# Patient Record
Sex: FEMALE | Race: WHITE | NOT HISPANIC OR LATINO | Employment: OTHER | URBAN - METROPOLITAN AREA
[De-identification: names, ages, dates, MRNs, and addresses within clinical notes are randomized per-mention and may not be internally consistent; named-entity substitution may affect disease eponyms.]

---

## 2017-02-07 ENCOUNTER — OFFICE VISIT (OUTPATIENT)
Dept: URGENT CARE | Facility: CLINIC | Age: 82
End: 2017-02-07
Payer: MEDICARE

## 2017-02-07 ENCOUNTER — APPOINTMENT (OUTPATIENT)
Dept: RADIOLOGY | Facility: IMAGING CENTER | Age: 82
End: 2017-02-07
Attending: PHYSICIAN ASSISTANT
Payer: MEDICARE

## 2017-02-07 VITALS
BODY MASS INDEX: 21.62 KG/M2 | WEIGHT: 126 LBS | OXYGEN SATURATION: 97 % | SYSTOLIC BLOOD PRESSURE: 122 MMHG | HEART RATE: 88 BPM | RESPIRATION RATE: 18 BRPM | TEMPERATURE: 97.6 F | DIASTOLIC BLOOD PRESSURE: 80 MMHG

## 2017-02-07 DIAGNOSIS — J40 BRONCHITIS: ICD-10-CM

## 2017-02-07 DIAGNOSIS — J06.9 URI WITH COUGH AND CONGESTION: ICD-10-CM

## 2017-02-07 DIAGNOSIS — J40 BRONCHITIS: Primary | ICD-10-CM

## 2017-02-07 PROCEDURE — 99214 OFFICE O/P EST MOD 30 MIN: CPT | Performed by: PHYSICIAN ASSISTANT

## 2017-02-07 PROCEDURE — G8432 DEP SCR NOT DOC, RNG: HCPCS | Performed by: PHYSICIAN ASSISTANT

## 2017-02-07 PROCEDURE — 1036F TOBACCO NON-USER: CPT | Performed by: PHYSICIAN ASSISTANT

## 2017-02-07 PROCEDURE — 4040F PNEUMOC VAC/ADMIN/RCVD: CPT | Mod: 8P | Performed by: PHYSICIAN ASSISTANT

## 2017-02-07 PROCEDURE — G8419 CALC BMI OUT NRM PARAM NOF/U: HCPCS | Performed by: PHYSICIAN ASSISTANT

## 2017-02-07 PROCEDURE — 71020 DX-CHEST-2 VIEWS: CPT | Mod: TC | Performed by: PHYSICIAN ASSISTANT

## 2017-02-07 PROCEDURE — 1101F PT FALLS ASSESS-DOCD LE1/YR: CPT | Performed by: PHYSICIAN ASSISTANT

## 2017-02-07 PROCEDURE — G8484 FLU IMMUNIZE NO ADMIN: HCPCS | Performed by: PHYSICIAN ASSISTANT

## 2017-02-07 RX ORDER — PROMETHAZINE HYDROCHLORIDE AND CODEINE PHOSPHATE 6.25; 1 MG/5ML; MG/5ML
5 SYRUP ORAL 4 TIMES DAILY PRN
Qty: 240 ML | Refills: 0 | Status: SHIPPED | OUTPATIENT
Start: 2017-02-07 | End: 2017-02-21

## 2017-02-07 RX ORDER — SULFAMETHOXAZOLE AND TRIMETHOPRIM 800; 160 MG/1; MG/1
1 TABLET ORAL 2 TIMES DAILY
Qty: 20 TAB | Refills: 0 | Status: SHIPPED | OUTPATIENT
Start: 2017-02-07 | End: 2017-02-17

## 2017-02-07 RX ORDER — BENZONATATE 200 MG/1
200 CAPSULE ORAL 3 TIMES DAILY PRN
Qty: 21 CAP | Refills: 0 | Status: SHIPPED | OUTPATIENT
Start: 2017-02-07 | End: 2018-01-24 | Stop reason: SDUPTHER

## 2017-02-07 NOTE — MR AVS SNAPSHOT
Char Rockwell   2017 10:00 AM   Office Visit   MRN: [id number]    Department:  [facility]   Dept Phone:  [phone]    Description:  Female : 3/1/1930   Provider:  Vasu Ramirez PA-C           Reason for Visit     Sinus Problem           Allergies as of 2017     No Known Allergies      You were diagnosed with     Bronchitis   [873457]  -  Primary     URI with cough and congestion   [[id number]]         Vital Signs     Blood Pressure Pulse Temperature Respirations Weight Oxygen Saturation    122/80 mmHg 88 36.4 °C (97.6 °F) 18 57.153 kg (126 lb) 97%    Smoking Status                   Never Smoker            Basic Information     Date Of Birth Sex Race Ethnicity Preferred Language    3/1/1930 Female Unknown Non- English      Problem List              ICD-10-CM Priority Class Noted - Resolved    Osteoporosis M81.0   2016 - Present    Elevated blood pressure I10   2016 - Present    Preventative health care Z00.00   2016 - Present    Hyperlipidemia LDL goal <100 E78.5   2016 - Present    Cataracts, bilateral H26.9   2016 - Present      Health Maintenance        Date Due Completion Dates    IMM DTaP/Tdap/Td Vaccine (1 - Tdap) 3/1/1949 ---    PAP SMEAR 3/1/1951 ---    MAMMOGRAM 3/1/1970 ---    COLONOSCOPY 3/1/1980 ---    IMM ZOSTER VACCINE 3/1/1990 ---    BONE DENSITY 3/1/1995 ---    IMM PNEUMOCOCCAL 65+ (ADULT) LOW/MEDIUM RISK SERIES (1 of 2 - PCV13) 3/1/1995 ---    IMM INFLUENZA (1) 2016 ---            Current Immunizations     No immunizations on file.      Below and/or attached are the medications your provider expects you to take. Review all of your home medications and newly ordered medications with your provider and/or pharmacist. Follow medication instructions as directed by your provider and/or pharmacist. Please keep your medication list with you and share with your provider. Update the information when medications are discontinued, doses  are changed, or new medications (including over-the-counter products) are added; and carry medication information at all times in the event of emergency situations     Allergies:  No Known Allergies          Medications  Valid as of: February 07, 2017 - 11:08 AM    Generic Name Brand Name Tablet Size Instructions for use    Benzonatate (Cap) TESSALON 200 MG Take 1 Cap by mouth 3 times a day as needed for Cough for up to 7 days.        Promethazine-Codeine (Syrup) PHENERGAN-CODEINE 6.25-10 MG/5ML Take 5 mL by mouth 4 times a day as needed for up to 14 days.        Sulfamethoxazole-Trimethoprim (Tab) BACTRIM -160 MG Take 1 Tab by mouth 2 times a day for 10 days.        .                 Medicines prescribed today were sent to:     [id number] - [place], [place] - [address]    [address] [place] [address]    Phone: [phone] Fax: [phone]    Open 24 Hours?: No      Medication refill instructions:       If your prescription bottle indicates you have medication refills left, it is not necessary to call your provider’s office. Please contact your pharmacy and they will refill your medication.    If your prescription bottle indicates you do not have any refills left, you may request refills at any time through one of the following ways: The online PublicRelay system (except Urgent Care), by calling your provider’s office, or by asking your pharmacy to contact your provider’s office with a refill request. Medication refills are processed only during regular business hours and may not be available until the next business day. Your provider may request additional information or to have a follow-up visit with you prior to refilling your medication.   *Please Note: Medication refills are assigned a new Rx number when refilled electronically. Your pharmacy may indicate that no refills were authorized even though a new prescription for the same medication is available at the pharmacy. Please request the  medicine by name with the pharmacy before contacting your provider for a refill.        Your To Do List     Future Labs/Procedures Complete By Expires    DX-CHEST-2 VIEWS  As directed 2/7/2018      Instructions    Acute Bronchitis  Bronchitis is inflammation of the airways that extend from the windpipe into the lungs (bronchi). The inflammation often causes mucus to develop. This leads to a cough, which is the most common symptom of bronchitis.   In acute bronchitis, the condition usually develops suddenly and goes away over time, usually in a couple weeks. Smoking, allergies, and asthma can make bronchitis worse. Repeated episodes of bronchitis may cause further lung problems.   CAUSES  Acute bronchitis is most often caused by the same virus that causes a cold. The virus can spread from person to person (contagious) through coughing, sneezing, and touching contaminated objects.  SIGNS AND SYMPTOMS   · Cough.    · Fever.    · Coughing up mucus.    · Body aches.    · Chest congestion.    · Chills.    · Shortness of breath.    · Sore throat.    DIAGNOSIS   Acute bronchitis is usually diagnosed through a physical exam. Your health care provider will also ask you questions about your medical history. Tests, such as chest X-rays, are sometimes done to rule out other conditions.   TREATMENT   Acute bronchitis usually goes away in a couple weeks. Oftentimes, no medical treatment is necessary. Medicines are sometimes given for relief of fever or cough. Antibiotic medicines are usually not needed but may be prescribed in certain situations. In some cases, an inhaler may be recommended to help reduce shortness of breath and control the cough. A cool mist vaporizer may also be used to help thin bronchial secretions and make it easier to clear the chest.   HOME CARE INSTRUCTIONS  · Get plenty of rest.    · Drink enough fluids to keep your urine clear or pale yellow (unless you have a medical condition that requires fluid  restriction). Increasing fluids may help thin your respiratory secretions (sputum) and reduce chest congestion, and it will prevent dehydration.    · Take medicines only as directed by your health care provider.  · If you were prescribed an antibiotic medicine, finish it all even if you start to feel better.  · Avoid smoking and secondhand smoke. Exposure to cigarette smoke or irritating chemicals will make bronchitis worse. If you are a smoker, consider using nicotine gum or skin patches to help control withdrawal symptoms. Quitting smoking will help your lungs heal faster.    · Reduce the chances of another bout of acute bronchitis by washing your hands frequently, avoiding people with cold symptoms, and trying not to touch your hands to your mouth, nose, or eyes.    · Keep all follow-up visits as directed by your health care provider.    SEEK MEDICAL CARE IF:  Your symptoms do not improve after 1 week of treatment.   SEEK IMMEDIATE MEDICAL CARE IF:  · You develop an increased fever or chills.    · You have chest pain.    · You have severe shortness of breath.  · You have bloody sputum.    · You develop dehydration.  · You faint or repeatedly feel like you are going to pass out.  · You develop repeated vomiting.  · You develop a severe headache.  MAKE SURE YOU:   · Understand these instructions.  · Will watch your condition.  · Will get help right away if you are not doing well or get worse.     This information is not intended to replace advice given to you by your health care provider. Make sure you discuss any questions you have with your health care provider.     Document Released: 01/25/2006 Document Revised: 01/08/2016 Document Reviewed: 06/10/2014  [employer] Interactive Patient Education ©2016 [employer]            [email or web address] Access Code: [email or web address]  Expires: 3/9/2017 11:03 AM    Xinguodu  A secure, online tool to manage your health information     [employer]’s Xinguodu® is a secure, online tool that  connects you to your personalized health information from the privacy of your home -- day or night - making it very easy for you to manage your healthcare. Once the activation process is completed, you can even access your medical information using the [employer] shu, which is available for free in the Apple Shu store or Google Play store.     [employer] provides the following levels of access (as shown below):   My Chart Features   [facility] Doctor [facility]  Specialists [facility]  Urgent  Care Non-[facility]  Primary Care  Doctor   Email your healthcare team securely and privately 24/7 X X X    Manage appointments: schedule your next appointment; view details of past/upcoming appointments X      Request prescription refills. X      View recent personal medical records, including lab and immunizations X X X X   View health record, including health history, allergies, medications X X X X   Read reports about your outpatient visits, procedures, consult and ER notes X X X X   See your discharge summary, which is a recap of your hospital and/or ER visit that includes your diagnosis, lab results, and care plan. X X       How to register for [employer]:  1. Go to  [email or web address].  2. Click on the Sign Up Now box, which takes you to the New Member Sign Up page. You will need to provide the following information:  a. Enter your [employer] Access Code exactly as it appears at the top of this page. (You will not need to use this code after you’ve completed the sign-up process. If you do not sign up before the expiration date, you must request a new code.)   b. Enter your date of birth.   c. Enter your home email address.   d. Click Submit, and follow the next screen’s instructions.  3. Create a [employer] ID. This will be your [employer] login ID and cannot be changed, so think of one that is secure and easy to remember.  4. Create a [employer] password. You can change your password at any time.  5. Enter your Password Reset Question  and Answer. This can be used at a later time if you forget your password.   6. Enter your e-mail address. This allows you to receive e-mail notifications when new information is available in [employer].  7. Click Sign Up. You can now view your health information.    For assistance activating your [employer] account, call [phone]

## 2017-02-07 NOTE — PATIENT INSTRUCTIONS
Acute Bronchitis  Bronchitis is inflammation of the airways that extend from the windpipe into the lungs (bronchi). The inflammation often causes mucus to develop. This leads to a cough, which is the most common symptom of bronchitis.   In acute bronchitis, the condition usually develops suddenly and goes away over time, usually in a couple weeks. Smoking, allergies, and asthma can make bronchitis worse. Repeated episodes of bronchitis may cause further lung problems.   CAUSES  Acute bronchitis is most often caused by the same virus that causes a cold. The virus can spread from person to person (contagious) through coughing, sneezing, and touching contaminated objects.  SIGNS AND SYMPTOMS   · Cough.    · Fever.    · Coughing up mucus.    · Body aches.    · Chest congestion.    · Chills.    · Shortness of breath.    · Sore throat.    DIAGNOSIS   Acute bronchitis is usually diagnosed through a physical exam. Your health care provider will also ask you questions about your medical history. Tests, such as chest X-rays, are sometimes done to rule out other conditions.   TREATMENT   Acute bronchitis usually goes away in a couple weeks. Oftentimes, no medical treatment is necessary. Medicines are sometimes given for relief of fever or cough. Antibiotic medicines are usually not needed but may be prescribed in certain situations. In some cases, an inhaler may be recommended to help reduce shortness of breath and control the cough. A cool mist vaporizer may also be used to help thin bronchial secretions and make it easier to clear the chest.   HOME CARE INSTRUCTIONS  · Get plenty of rest.    · Drink enough fluids to keep your urine clear or pale yellow (unless you have a medical condition that requires fluid restriction). Increasing fluids may help thin your respiratory secretions (sputum) and reduce chest congestion, and it will prevent dehydration.    · Take medicines only as directed by your health care provider.  · If  you were prescribed an antibiotic medicine, finish it all even if you start to feel better.  · Avoid smoking and secondhand smoke. Exposure to cigarette smoke or irritating chemicals will make bronchitis worse. If you are a smoker, consider using nicotine gum or skin patches to help control withdrawal symptoms. Quitting smoking will help your lungs heal faster.    · Reduce the chances of another bout of acute bronchitis by washing your hands frequently, avoiding people with cold symptoms, and trying not to touch your hands to your mouth, nose, or eyes.    · Keep all follow-up visits as directed by your health care provider.    SEEK MEDICAL CARE IF:  Your symptoms do not improve after 1 week of treatment.   SEEK IMMEDIATE MEDICAL CARE IF:  · You develop an increased fever or chills.    · You have chest pain.    · You have severe shortness of breath.  · You have bloody sputum.    · You develop dehydration.  · You faint or repeatedly feel like you are going to pass out.  · You develop repeated vomiting.  · You develop a severe headache.  MAKE SURE YOU:   · Understand these instructions.  · Will watch your condition.  · Will get help right away if you are not doing well or get worse.     This information is not intended to replace advice given to you by your health care provider. Make sure you discuss any questions you have with your health care provider.     Document Released: 01/25/2006 Document Revised: 01/08/2016 Document Reviewed: 06/10/2014  [employer] Interactive Patient Education ©2016 [employer]

## 2017-02-07 NOTE — PROGRESS NOTES
Subjective:      PT is a 86 y.o. female who presents with Sinus Problem            Sinus Problem  This is a new problem. The current episode started in the past 7 days. The problem has been gradually worsening since onset. There has been no fever. Her pain is at a severity of 6/10. The pain is moderate. Past treatments include oral decongestants. The treatment provided no relief.   PT presents to  clinic today complaining of sore throat, fevers, chills, watery eyes, pressure in ears, cough, fatigue, runny nose, wheezing and SOB. PT denies CP, NVD, abdominal pain, joint pain. PT states these symptoms began around 3 days ago and that the pt's family has been sick on and off for the last week. Pt has not taken any medications for this condition. PT states the pain is a 7/10 with coughing and sinus pressure, aching in nature and worse at night.  The pt's medication list, problem list, and allergies have been evaluated and reviewed during today's visit.    PMH:  Past Medical History   Diagnosis Date   • Allergy      environmental       PSH:  Past Surgical History   Procedure Laterality Date   • Mammoplasty augmentation  age 30's       Fam Hx:  Father with hx of HTN    Family Status   Relation Status Death Age   • Mother     • Father     • Daughter Alive        Soc HX:  Social History     Social History   • Marital Status:      Spouse Name: N/A   • Number of Children: N/A   • Years of Education: N/A     Occupational History   • Not on file.     Social History Main Topics   • Smoking status: Never Smoker    • Smokeless tobacco: Never Used   • Alcohol Use: No   • Drug Use: No   • Sexual Activity: Not Currently     Other Topics Concern   • Not on file     Social History Narrative   • No narrative on file         Medications:    Current outpatient prescriptions:   •  sulfamethoxazole-trimethoprim (BACTRIM DS) 800-160 MG tablet, Take 1 Tab by mouth 2 times a day for 10 days., Disp: 20 Tab, Rfl: 0  •   promethazine-codeine (PHENERGAN-CODEINE) 6.25-10 MG/5ML Syrup, Take 5 mL by mouth 4 times a day as needed for up to 14 days., Disp: 240 mL, Rfl: 0  •  benzonatate (TESSALON) 200 MG capsule, Take 1 Cap by mouth 3 times a day as needed for Cough for up to 7 days., Disp: 21 Cap, Rfl: 0      Allergies:  Review of patient's allergies indicates no known allergies.        ROS  Review of Systems   Constitutional: Positive for chills and malaise/fatigue. Negative for fever and diaphoresis.   HENT: Positive for congestion, ear pain and sore throat. Negative for ear discharge, hearing loss, nosebleeds and tinnitus.    Eyes: Negative for blurred vision, double vision and photophobia.   Respiratory: Positive for cough, sputum production, shortness of breath and wheezing. Negative for hemoptysis.    Cardiovascular: Negative for chest pain and palpitations.   Gastrointestinal: Negative for nausea, vomiting, abdominal pain, diarrhea and constipation.   Genitourinary: Negative for dysuria and flank pain.   Musculoskeletal: Negative for joint pain and myalgias.   Skin: Negative for itching and rash.   Neurological: Positive for headaches. Negative for dizziness, tingling and weakness.   Endo/Heme/Allergies: Does not bruise/bleed easily.   Psychiatric/Behavioral: Negative for depression. The patient is not nervous/anxious.             Objective:     /80 mmHg  Pulse 88  Temp(Src) 36.4 °C (97.6 °F)  Resp 18  Wt 57.153 kg (126 lb)  SpO2 97%     Physical Exam       Physical Exam   Constitutional: PT is oriented to person, place, and time. PT appears well-developed and well-nourished. No distress.   HENT:   Head: Normocephalic and atraumatic.   Right Ear: Hearing, tympanic membrane, external ear and ear canal normal.   Left Ear: Hearing, tympanic membrane, external ear and ear canal normal.   Nose: Mucosal edema, rhinorrhea and sinus tenderness present. Right sinus exhibits frontal sinus tenderness. Left sinus exhibits frontal  sinus tenderness.   Mouth/Throat: Uvula is midline. Mucous membranes are pale. Posterior oropharyngeal edema and posterior oropharyngeal erythema present. No oropharyngeal exudate.   Eyes: Conjunctivae normal and EOM are normal. Pupils are equal, round, and reactive to light. Right eye exhibits no discharge. Left eye exhibits no discharge.   Neck: Normal range of motion. Neck supple. No thyromegaly present.   Cardiovascular: Normal rate, regular rhythm, normal heart sounds and intact distal pulses.  Exam reveals no gallop and no friction rub.    No murmur heard.  Pulmonary/Chest: Effort normal. No respiratory distress. PT has wheezes. PT has no rales. PT exhibits tenderness.   Abdominal: Soft. Bowel sounds are normal. PT exhibits no distension and no mass. There is no tenderness. There is no rebound and no guarding.   Musculoskeletal: Normal range of motion. PT exhibits no edema and no tenderness.   Lymphadenopathy:     PT has no cervical adenopathy.   Neurological: Pt is alert and oriented to person, place, and time. Pt has normal reflexes. No cranial nerve deficit.   Skin: Skin is warm and dry. No rash noted. No erythema.   Psychiatric: PT has a normal mood and affect. Pt behavior is normal. Judgment and thought content normal.       RADS:  Narrative        2/7/2017 10:41 AM    HISTORY/REASON FOR EXAM:  Cough  Productive cough for 4 days    TECHNIQUE/EXAM DESCRIPTION AND NUMBER OF VIEWS:  Two views of the chest.    COMPARISON:  None available.    FINDINGS:  Bilateral breast implants with capsular calcifications.  Cardiomediastinal contour is within normal limits.  Atherosclerotic change of thoracic aorta.  No focal pulmonary consolidation.  No pleural fluid collection or pneumothorax.  Minimal linear opacities at both lung bases.  Diffuse osteopenia.       Impression        1.  Minimal bibasilar subsegmental atelectasis.  2.  No pneumonia or pulmonary edema.            Reading Provider Reading Date     Markell  NERY Hernández Feb 7, 2017            Signing Provider Signing Date Signing Time     Markell Hernández M.D. Feb 7, 2017 10:53 AM          Assessment/Plan:     1. Bronchitis    - DX-CHEST-2 VIEWS; Future  - sulfamethoxazole-trimethoprim (BACTRIM DS) 800-160 MG tablet; Take 1 Tab by mouth 2 times a day for 10 days.  Dispense: 20 Tab; Refill: 0    2. URI with cough and congestion    - DX-CHEST-2 VIEWS; Future  - promethazine-codeine (PHENERGAN-CODEINE) 6.25-10 MG/5ML Syrup; Take 5 mL by mouth 4 times a day as needed for up to 14 days.  Dispense: 240 mL; Refill: 0  - benzonatate (TESSALON) 200 MG capsule; Take 1 Cap by mouth 3 times a day as needed for Cough for up to 7 days.  Dispense: 21 Cap; Refill: 0    Rest, fluids encouraged.  OTC decongestant for congestion/cough  AVS with medical info given.  Pt was in full understanding and agreement with the plan.  Follow-up as needed if symptoms worsen or fail to improve.

## 2017-02-14 ENCOUNTER — OFFICE VISIT (OUTPATIENT)
Dept: URGENT CARE | Facility: CLINIC | Age: 82
End: 2017-02-14
Payer: MEDICARE

## 2017-02-14 VITALS
OXYGEN SATURATION: 95 % | SYSTOLIC BLOOD PRESSURE: 120 MMHG | RESPIRATION RATE: 20 BRPM | HEART RATE: 78 BPM | DIASTOLIC BLOOD PRESSURE: 80 MMHG | TEMPERATURE: 97.6 F

## 2017-02-14 DIAGNOSIS — R11.0 NAUSEA: ICD-10-CM

## 2017-02-14 PROCEDURE — 4040F PNEUMOC VAC/ADMIN/RCVD: CPT | Mod: 8P | Performed by: NURSE PRACTITIONER

## 2017-02-14 PROCEDURE — G8432 DEP SCR NOT DOC, RNG: HCPCS | Performed by: NURSE PRACTITIONER

## 2017-02-14 PROCEDURE — G8419 CALC BMI OUT NRM PARAM NOF/U: HCPCS | Performed by: NURSE PRACTITIONER

## 2017-02-14 PROCEDURE — 1101F PT FALLS ASSESS-DOCD LE1/YR: CPT | Performed by: NURSE PRACTITIONER

## 2017-02-14 PROCEDURE — 99214 OFFICE O/P EST MOD 30 MIN: CPT | Performed by: NURSE PRACTITIONER

## 2017-02-14 PROCEDURE — G8484 FLU IMMUNIZE NO ADMIN: HCPCS | Performed by: NURSE PRACTITIONER

## 2017-02-14 PROCEDURE — 1036F TOBACCO NON-USER: CPT | Performed by: NURSE PRACTITIONER

## 2017-02-14 RX ORDER — ONDANSETRON 4 MG/1
4 TABLET, ORALLY DISINTEGRATING ORAL EVERY 8 HOURS PRN
Qty: 12 TAB | Refills: 0 | Status: SHIPPED | OUTPATIENT
Start: 2017-02-14 | End: 2017-05-29

## 2017-02-14 ASSESSMENT — ENCOUNTER SYMPTOMS
WHEEZING: 0
MYALGIAS: 0
DIZZINESS: 0
SPUTUM PRODUCTION: 1
DIARRHEA: 0
ORTHOPNEA: 0
PALPITATIONS: 0
BACK PAIN: 0
COUGH: 1
EYE DISCHARGE: 0
ABDOMINAL PAIN: 0
HEADACHES: 0
FEVER: 0
WEAKNESS: 0
VOMITING: 0
SHORTNESS OF BREATH: 0
NAUSEA: 1
SORE THROAT: 0
CONSTIPATION: 1
EYE REDNESS: 0
CHILLS: 0

## 2017-02-14 NOTE — PROGRESS NOTES
Subjective:      Char Rockwell is a 86 y.o. female who presents with Cough            Cough  Pertinent negatives include no chest pain, chills, ear pain, eye redness, fever, headaches, myalgias, sore throat, shortness of breath or wheezing. There is no history of environmental allergies.   Char is a 86 year old female who is here for nausea. States took Bactrim x 6 days but had to stop due to GI upset of nausea without vomiting, no diarrhea but had constipation and took a laxative but now has nausea/upset stomach. States cough is still present but is getting out mucus, denies SOB or chest tightness. CXR done 7 days ago and showed no PNA. Has nasal congestion without facial pressure. States nasal and chest mucus to be pale yellow. Sleeping through the night.    PMH:  has a past medical history of Allergy.  MEDS:   Current outpatient prescriptions:   •  ondansetron (ZOFRAN ODT) 4 MG TABLET DISPERSIBLE, Take 1 Tab by mouth every 8 hours as needed for Nausea/Vomiting., Disp: 12 Tab, Rfl: 0  •  sulfamethoxazole-trimethoprim (BACTRIM DS) 800-160 MG tablet, Take 1 Tab by mouth 2 times a day for 10 days., Disp: 20 Tab, Rfl: 0  •  promethazine-codeine (PHENERGAN-CODEINE) 6.25-10 MG/5ML Syrup, Take 5 mL by mouth 4 times a day as needed for up to 14 days., Disp: 240 mL, Rfl: 0  •  benzonatate (TESSALON) 200 MG capsule, Take 1 Cap by mouth 3 times a day as needed for Cough for up to 7 days., Disp: 21 Cap, Rfl: 0  ALLERGIES: No Known Allergies  SURGHX:   Past Surgical History   Procedure Laterality Date   • Mammoplasty augmentation  age 30's     SOCHX:  reports that she has never smoked. She has never used smokeless tobacco. She reports that she does not drink alcohol or use illicit drugs.  FH: Family history was reviewed, no pertinent findings to report          Review of Systems   Constitutional: Negative for fever, chills and malaise/fatigue.   HENT: Positive for congestion. Negative for ear pain and sore  throat.    Eyes: Negative for discharge and redness.   Respiratory: Positive for cough and sputum production. Negative for shortness of breath and wheezing.    Cardiovascular: Negative for chest pain, palpitations and orthopnea.   Gastrointestinal: Positive for nausea and constipation. Negative for vomiting, abdominal pain and diarrhea.   Musculoskeletal: Negative for myalgias and back pain.   Neurological: Negative for dizziness, weakness and headaches.   Endo/Heme/Allergies: Negative for environmental allergies.          Objective:     /80 mmHg  Pulse 78  Temp(Src) 36.4 °C (97.6 °F)  Resp 20  SpO2 95%     Physical Exam   Constitutional: She is oriented to person, place, and time. Vital signs are normal. She appears well-developed and well-nourished.  Non-toxic appearance. She does not have a sickly appearance. She does not appear ill. No distress.   HENT:   Head: Normocephalic.   Eyes: Conjunctivae and EOM are normal. Pupils are equal, round, and reactive to light.   Neck: Normal range of motion. Neck supple.   Cardiovascular: Normal rate and regular rhythm.    Pulmonary/Chest: Effort normal and breath sounds normal. No accessory muscle usage. No tachypnea. No respiratory distress. She has no decreased breath sounds. She has no wheezes. She has no rhonchi. She has no rales.   Abdominal: Soft. Bowel sounds are normal. She exhibits no distension. There is no tenderness. There is no rebound and no guarding.   Musculoskeletal: Normal range of motion.   Neurological: She is alert and oriented to person, place, and time.   Skin: Skin is warm and dry. She is not diaphoretic.   Vitals reviewed.         Patient declines additional abx like zpak due to short term abx of 6 days on Bactrim, will recheck if necessary at end of week with no improvement/worsening symptoms          Assessment/Plan:     1. Nausea    - ondansetron (ZOFRAN ODT) 4 MG TABLET DISPERSIBLE; Take 1 Tab by mouth every 8 hours as needed for  Nausea/Vomiting.  Dispense: 12 Tab; Refill: 0    Maintain water status slowly with sips of water and electrolyte fluid, increase to larger amounts as tolerated  Introduce solid foods when vomiting ceases. Eat items from the BRAT diet- trying small amount with one item at a time  Monitor for fever, increased abdominal pain, n/v, lethargy, continued diarrhea- need re-evaluation  Get rest  May use Ibuprofen/Tylenol prn for fever  Monitor for consistent fever >101 with treatment, increase in cough with SOB or labored breathing- need re-evaluation

## 2017-05-29 ENCOUNTER — APPOINTMENT (OUTPATIENT)
Dept: RADIOLOGY | Facility: MEDICAL CENTER | Age: 82
DRG: 481 | End: 2017-05-29
Attending: EMERGENCY MEDICINE
Payer: MEDICARE

## 2017-05-29 ENCOUNTER — RESOLUTE PROFESSIONAL BILLING HOSPITAL PROF FEE (OUTPATIENT)
Dept: HOSPITALIST | Facility: MEDICAL CENTER | Age: 82
End: 2017-05-29
Payer: MEDICARE

## 2017-05-29 ENCOUNTER — HOSPITAL ENCOUNTER (INPATIENT)
Facility: MEDICAL CENTER | Age: 82
LOS: 4 days | DRG: 481 | End: 2017-06-02
Attending: EMERGENCY MEDICINE | Admitting: INTERNAL MEDICINE
Payer: MEDICARE

## 2017-05-29 DIAGNOSIS — S72.002A HIP FRACTURE, LEFT, CLOSED, INITIAL ENCOUNTER (HCC): ICD-10-CM

## 2017-05-29 PROBLEM — S72.143A INTERTROCHANTERIC FRACTURE (HCC): Status: ACTIVE | Noted: 2017-05-29

## 2017-05-29 LAB
ALBUMIN SERPL BCP-MCNC: 4.2 G/DL (ref 3.2–4.9)
ALBUMIN/GLOB SERPL: 1.5 G/DL
ALP SERPL-CCNC: 114 U/L (ref 30–99)
ALT SERPL-CCNC: 25 U/L (ref 2–50)
ANION GAP SERPL CALC-SCNC: 10 MMOL/L (ref 0–11.9)
APTT PPP: 23.5 SEC (ref 24.7–36)
AST SERPL-CCNC: 24 U/L (ref 12–45)
BASOPHILS # BLD AUTO: 0.6 % (ref 0–1.8)
BASOPHILS # BLD: 0.05 K/UL (ref 0–0.12)
BILIRUB SERPL-MCNC: 0.4 MG/DL (ref 0.1–1.5)
BNP SERPL-MCNC: 96 PG/ML (ref 0–100)
BUN SERPL-MCNC: 22 MG/DL (ref 8–22)
CALCIUM SERPL-MCNC: 9.2 MG/DL (ref 8.5–10.5)
CHLORIDE SERPL-SCNC: 105 MMOL/L (ref 96–112)
CO2 SERPL-SCNC: 25 MMOL/L (ref 20–33)
CREAT SERPL-MCNC: 0.76 MG/DL (ref 0.5–1.4)
EKG IMPRESSION: NORMAL
EOSINOPHIL # BLD AUTO: 0.05 K/UL (ref 0–0.51)
EOSINOPHIL NFR BLD: 0.6 % (ref 0–6.9)
ERYTHROCYTE [DISTWIDTH] IN BLOOD BY AUTOMATED COUNT: 42.8 FL (ref 35.9–50)
GFR SERPL CREATININE-BSD FRML MDRD: >60 ML/MIN/1.73 M 2
GLOBULIN SER CALC-MCNC: 2.8 G/DL (ref 1.9–3.5)
GLUCOSE SERPL-MCNC: 111 MG/DL (ref 65–99)
HCT VFR BLD AUTO: 41.7 % (ref 37–47)
HGB BLD-MCNC: 13.8 G/DL (ref 12–16)
IMM GRANULOCYTES # BLD AUTO: 0.09 K/UL (ref 0–0.11)
IMM GRANULOCYTES NFR BLD AUTO: 1.1 % (ref 0–0.9)
INR PPP: 0.91 (ref 0.87–1.13)
LYMPHOCYTES # BLD AUTO: 1.29 K/UL (ref 1–4.8)
LYMPHOCYTES NFR BLD: 16.3 % (ref 22–41)
MCH RBC QN AUTO: 29.9 PG (ref 27–33)
MCHC RBC AUTO-ENTMCNC: 33.1 G/DL (ref 33.6–35)
MCV RBC AUTO: 90.5 FL (ref 81.4–97.8)
MONOCYTES # BLD AUTO: 0.37 K/UL (ref 0–0.85)
MONOCYTES NFR BLD AUTO: 4.7 % (ref 0–13.4)
NEUTROPHILS # BLD AUTO: 6.06 K/UL (ref 2–7.15)
NEUTROPHILS NFR BLD: 76.7 % (ref 44–72)
NRBC # BLD AUTO: 0 K/UL
NRBC BLD AUTO-RTO: 0 /100 WBC
PLATELET # BLD AUTO: 209 K/UL (ref 164–446)
PMV BLD AUTO: 9.8 FL (ref 9–12.9)
POTASSIUM SERPL-SCNC: 3.5 MMOL/L (ref 3.6–5.5)
PROT SERPL-MCNC: 7 G/DL (ref 6–8.2)
PROTHROMBIN TIME: 12.5 SEC (ref 12–14.6)
RBC # BLD AUTO: 4.61 M/UL (ref 4.2–5.4)
SODIUM SERPL-SCNC: 140 MMOL/L (ref 135–145)
TROPONIN I SERPL-MCNC: <0.01 NG/ML (ref 0–0.04)
WBC # BLD AUTO: 7.9 K/UL (ref 4.8–10.8)

## 2017-05-29 PROCEDURE — 80053 COMPREHEN METABOLIC PANEL: CPT

## 2017-05-29 PROCEDURE — 85730 THROMBOPLASTIN TIME PARTIAL: CPT

## 2017-05-29 PROCEDURE — 303105 HCHG CATHETER EXTRA

## 2017-05-29 PROCEDURE — 99223 1ST HOSP IP/OBS HIGH 75: CPT | Mod: AI | Performed by: INTERNAL MEDICINE

## 2017-05-29 PROCEDURE — A9270 NON-COVERED ITEM OR SERVICE: HCPCS | Performed by: INTERNAL MEDICINE

## 2017-05-29 PROCEDURE — 85025 COMPLETE CBC W/AUTO DIFF WBC: CPT

## 2017-05-29 PROCEDURE — 85610 PROTHROMBIN TIME: CPT

## 2017-05-29 PROCEDURE — 99285 EMERGENCY DEPT VISIT HI MDM: CPT

## 2017-05-29 PROCEDURE — 73552 X-RAY EXAM OF FEMUR 2/>: CPT | Mod: LT

## 2017-05-29 PROCEDURE — 700111 HCHG RX REV CODE 636 W/ 250 OVERRIDE (IP): Performed by: INTERNAL MEDICINE

## 2017-05-29 PROCEDURE — 72170 X-RAY EXAM OF PELVIS: CPT

## 2017-05-29 PROCEDURE — 71010 DX-CHEST-LIMITED (1 VIEW): CPT

## 2017-05-29 PROCEDURE — 700105 HCHG RX REV CODE 258: Performed by: INTERNAL MEDICINE

## 2017-05-29 PROCEDURE — 700102 HCHG RX REV CODE 250 W/ 637 OVERRIDE(OP): Performed by: INTERNAL MEDICINE

## 2017-05-29 PROCEDURE — 84484 ASSAY OF TROPONIN QUANT: CPT

## 2017-05-29 PROCEDURE — 83880 ASSAY OF NATRIURETIC PEPTIDE: CPT

## 2017-05-29 PROCEDURE — 93005 ELECTROCARDIOGRAM TRACING: CPT | Performed by: EMERGENCY MEDICINE

## 2017-05-29 PROCEDURE — 770006 HCHG ROOM/CARE - MED/SURG/GYN SEMI*

## 2017-05-29 PROCEDURE — 51702 INSERT TEMP BLADDER CATH: CPT

## 2017-05-29 RX ORDER — MORPHINE SULFATE 4 MG/ML
1 INJECTION, SOLUTION INTRAMUSCULAR; INTRAVENOUS EVERY 4 HOURS PRN
Status: DISCONTINUED | OUTPATIENT
Start: 2017-05-29 | End: 2017-06-02 | Stop reason: HOSPADM

## 2017-05-29 RX ORDER — ONDANSETRON 4 MG/1
4 TABLET, ORALLY DISINTEGRATING ORAL EVERY 4 HOURS PRN
Status: DISCONTINUED | OUTPATIENT
Start: 2017-05-29 | End: 2017-06-02 | Stop reason: HOSPADM

## 2017-05-29 RX ORDER — OXYCODONE HYDROCHLORIDE 5 MG/1
5 TABLET ORAL EVERY 4 HOURS PRN
Status: DISCONTINUED | OUTPATIENT
Start: 2017-05-29 | End: 2017-06-02 | Stop reason: HOSPADM

## 2017-05-29 RX ORDER — POLYETHYLENE GLYCOL 3350 17 G/17G
1 POWDER, FOR SOLUTION ORAL
Status: DISCONTINUED | OUTPATIENT
Start: 2017-05-29 | End: 2017-06-02 | Stop reason: HOSPADM

## 2017-05-29 RX ORDER — AMOXICILLIN 250 MG
2 CAPSULE ORAL 2 TIMES DAILY
Status: DISCONTINUED | OUTPATIENT
Start: 2017-05-29 | End: 2017-06-02 | Stop reason: HOSPADM

## 2017-05-29 RX ORDER — BISACODYL 10 MG
10 SUPPOSITORY, RECTAL RECTAL
Status: DISCONTINUED | OUTPATIENT
Start: 2017-05-29 | End: 2017-06-01

## 2017-05-29 RX ORDER — ACETAMINOPHEN 325 MG/1
650 TABLET ORAL EVERY 6 HOURS PRN
Status: DISCONTINUED | OUTPATIENT
Start: 2017-05-29 | End: 2017-05-30

## 2017-05-29 RX ORDER — SODIUM CHLORIDE 9 MG/ML
INJECTION, SOLUTION INTRAVENOUS CONTINUOUS
Status: DISPENSED | OUTPATIENT
Start: 2017-05-29 | End: 2017-05-30

## 2017-05-29 RX ORDER — ONDANSETRON 2 MG/ML
4 INJECTION INTRAMUSCULAR; INTRAVENOUS EVERY 4 HOURS PRN
Status: DISCONTINUED | OUTPATIENT
Start: 2017-05-29 | End: 2017-06-02 | Stop reason: HOSPADM

## 2017-05-29 RX ADMIN — SODIUM CHLORIDE: 9 INJECTION, SOLUTION INTRAVENOUS at 19:51

## 2017-05-29 RX ADMIN — STANDARDIZED SENNA CONCENTRATE AND DOCUSATE SODIUM 2 TABLET: 8.6; 5 TABLET, FILM COATED ORAL at 19:51

## 2017-05-29 RX ADMIN — ACETAMINOPHEN 650 MG: 325 TABLET, FILM COATED ORAL at 19:51

## 2017-05-29 RX ADMIN — ONDANSETRON 4 MG: 4 TABLET, ORALLY DISINTEGRATING ORAL at 19:45

## 2017-05-29 ASSESSMENT — PAIN SCALES - GENERAL
PAINLEVEL_OUTOF10: 8

## 2017-05-29 ASSESSMENT — COPD QUESTIONNAIRES
COPD SCREENING SCORE: 2
DURING THE PAST 4 WEEKS HOW MUCH DID YOU FEEL SHORT OF BREATH: NONE/LITTLE OF THE TIME
HAVE YOU SMOKED AT LEAST 100 CIGARETTES IN YOUR ENTIRE LIFE: NO/DON'T KNOW
DO YOU EVER COUGH UP ANY MUCUS OR PHLEGM?: NO/ONLY WITH OCCASIONAL COLDS OR INFECTIONS

## 2017-05-29 ASSESSMENT — LIFESTYLE VARIABLES
EVER_SMOKED: NEVER
ALCOHOL_USE: NO
EVER_SMOKED: UNABLE TO EVALUATE AT THIS TIME - NEEDS ASSESSMENT PRIOR TO DISCHARGE

## 2017-05-29 NOTE — ED NOTES
Med rec updated and complete.  Allergies reviewed.    Pt denies taking medications  No PRESCRIPTION  No OTC  No herbal.  NO antibiotics in last 30 days.

## 2017-05-29 NOTE — IP AVS SNAPSHOT
Roadhop Access Code: Activation code not generated  Current Roadhop Status: Patient Declined    [employer]  A secure, online tool to manage your health information     [employer]’s Roadhop® is a secure, online tool that connects you to your personalized health information from the privacy of your home -- day or night - making it very easy for you to manage your healthcare. Once the activation process is completed, you can even access your medical information using the Roadhop shu, which is available for free in the Apple Shu store or Google Play store.     Roadhop provides the following levels of access (as shown below):   My Chart Features   [facility] Primary Care Doctor [facility]  Specialists [facility]  Urgent  Care Non-[facility]  Primary Care  Doctor   Email your healthcare team securely and privately 24/7 X X X X   Manage appointments: schedule your next appointment; view details of past/upcoming appointments X      Request prescription refills. X      View recent personal medical records, including lab and immunizations X X X X   View health record, including health history, allergies, medications X X X X   Read reports about your outpatient visits, procedures, consult and ER notes X X X X   See your discharge summary, which is a recap of your hospital and/or ER visit that includes your diagnosis, lab results, and care plan. X X       How to register for Roadhop:  1. Go to  [email or web address].  2. Click on the Sign Up Now box, which takes you to the New Member Sign Up page. You will need to provide the following information:  a. Enter your Roadhop Access Code exactly as it appears at the top of this page. (You will not need to use this code after you’ve completed the sign-up process. If you do not sign up before the expiration date, you must request a new code.)   b. Enter your date of birth.   c. Enter your home email address.   d. Click Submit, and follow the next screen’s instructions.  3. Create a Roadhop ID.  This will be your "[employer] login ID and cannot be changed, so think of one that is secure and easy to remember.  4. Create a "[employer] password. You can change your password at any time.  5. Enter your Password Reset Question and Answer. This can be used at a later time if you forget your password.   6. Enter your e-mail address. This allows you to receive e-mail notifications when new information is available in "[employer].  7. Click Sign Up. You can now view your health information.    For assistance activating your "[employer] account, call [phone]

## 2017-05-29 NOTE — ED PROVIDER NOTES
"ED Provider Note    Scribed for Ras Bautista M.D. by Vivek Rodriguez. 5/29/2017  1:55 PM    Primary care provider: JAMES Gaona  Means of arrival: iLli  History obtained from: Patient  History limited by: None    CHIEF COMPLAINT  Chief Complaint   Patient presents with   • GLF   • Leg Pain     left       HPI  Char Rockwell is a 87 y.o. female who presents to the Emergency Department complaining of a GLF at 12:00 PM, 2 hours ago. The patient was standing on a step when she slipped and fell on her hip. She has has associated left leg pain. She denies head pain, loss of consciousness, neck pain, chest pain, shortness of breath, back pain. The patient notes a history of hypertension when stressed. Denies a history of MI, Diabetes.    REVIEW OF SYSTEMS  Pertinent positives include left leg pain, left hip pain, fall. Pertinent negatives include no head pain, loss of consciousness, neck pain, chest pain, shortness of breath, back pain.  All other systems reviewed and negative.    C.    PAST MEDICAL HISTORY   has a past medical history of Allergy.    SURGICAL HISTORY   has past surgical history that includes mammoplasty augmentation (age 30's).    SOCIAL HISTORY  Social History   Substance Use Topics   • Smoking status: Never Smoker    • Smokeless tobacco: Never Used   • Alcohol Use: No      History   Drug Use No       FAMILY HISTORY  Family History   Problem Relation Age of Onset   • Family history unknown: Yes       CURRENT MEDICATIONS  No current facility-administered medications on file prior to encounter.     No current outpatient prescriptions on file prior to encounter.     ALLERGIES  No Known Allergies    PHYSICAL EXAM  VITAL SIGNS: /65 mmHg  Pulse 74  Temp(Src) 36.5 °C (97.7 °F)  Resp 15  Ht 1.626 m (5' 4\")  Wt 54.432 kg (120 lb)  BMI 20.59 kg/m2  SpO2 97%    Constitutional: Well developed, Well nourished, Mild distress.  HENT: Normocephalic, Atraumatic, Bilateral " external ears normal, Oropharynx moist, No oral exudates.   Eyes: PERRLA, EOMI, Conjunctiva normal, No discharge.   Neck: No tenderness, Supple, No stridor.   Lymphatic: No lymphadenopathy noted.   Cardiovascular: Normal heart rate, Normal rhythm.   Thorax & Lungs: Clear to auscultation bilaterally, No respiratory distress, No wheezing, No crackles.   Abdomen: Soft, No tenderness, No masses, No pulsatile masses.   Skin: Warm, Dry, No erythema, No rash.   Extremities: Left leg is shortened and externally rotated.   Musculoskeletal: Tenderness to palpation of left hip. Left leg is shortened and externally rotated.  Intact distal pulses  Neurologic: Awake, alert. Moves all extremities spontaneously.  Psychiatric: Affect normal, Judgment normal, Mood normal.     LABS  Results for orders placed or performed during the hospital encounter of 05/29/17   CBC w/ Differential   Result Value Ref Range    WBC 7.9 4.8 - 10.8 K/uL    RBC 4.61 4.20 - 5.40 M/uL    Hemoglobin 13.8 12.0 - 16.0 g/dL    Hematocrit 41.7 37.0 - 47.0 %    MCV 90.5 81.4 - 97.8 fL    MCH 29.9 27.0 - 33.0 pg    MCHC 33.1 (L) 33.6 - 35.0 g/dL    RDW 42.8 35.9 - 50.0 fL    Platelet Count 209 164 - 446 K/uL    MPV 9.8 9.0 - 12.9 fL    Neutrophils-Polys 76.70 (H) 44.00 - 72.00 %    Lymphocytes 16.30 (L) 22.00 - 41.00 %    Monocytes 4.70 0.00 - 13.40 %    Eosinophils 0.60 0.00 - 6.90 %    Basophils 0.60 0.00 - 1.80 %    Immature Granulocytes 1.10 (H) 0.00 - 0.90 %    Nucleated RBC 0.00 /100 WBC    Neutrophils (Absolute) 6.06 2.00 - 7.15 K/uL    Lymphs (Absolute) 1.29 1.00 - 4.80 K/uL    Monos (Absolute) 0.37 0.00 - 0.85 K/uL    Eos (Absolute) 0.05 0.00 - 0.51 K/uL    Baso (Absolute) 0.05 0.00 - 0.12 K/uL    Immature Granulocytes (abs) 0.09 0.00 - 0.11 K/uL    NRBC (Absolute) 0.00 K/uL   Complete Metabolic Panel (CMP)   Result Value Ref Range    Sodium 140 135 - 145 mmol/L    Potassium 3.5 (L) 3.6 - 5.5 mmol/L    Chloride 105 96 - 112 mmol/L    Co2 25 20 - 33  mmol/L    Anion Gap 10.0 0.0 - 11.9    Glucose 111 (H) 65 - 99 mg/dL    Bun 22 8 - 22 mg/dL    Creatinine 0.76 0.50 - 1.40 mg/dL    Calcium 9.2 8.5 - 10.5 mg/dL    AST(SGOT) 24 12 - 45 U/L    ALT(SGPT) 25 2 - 50 U/L    Alkaline Phosphatase 114 (H) 30 - 99 U/L    Total Bilirubin 0.4 0.1 - 1.5 mg/dL    Albumin 4.2 3.2 - 4.9 g/dL    Total Protein 7.0 6.0 - 8.2 g/dL    Globulin 2.8 1.9 - 3.5 g/dL    A-G Ratio 1.5 g/dL   Troponin   Result Value Ref Range    Troponin I <0.01 0.00 - 0.04 ng/mL   Btype Natriuretic Peptide (BNP)   Result Value Ref Range    B Natriuretic Peptide 96 0 - 100 pg/mL   Prothrombin (PT/INR)   Result Value Ref Range    PT 12.5 12.0 - 14.6 sec    INR 0.91 0.87 - 1.13   APTT   Result Value Ref Range    APTT 23.5 (L) 24.7 - 36.0 sec   ESTIMATED GFR   Result Value Ref Range    GFR If African American >60 >60 mL/min/1.73 m 2    GFR If Non African American >60 >60 mL/min/1.73 m 2   EKG (NOW)   Result Value Ref Range    Report       [facility] Emergency Dept.    Test Date:  2017  Pt Name:    SIDNEY WHATLEY                Department: ER  MRN:        [id number]                      Room:        21  Gender:     F                            Technician: [facility]  :        [id number]                   Requested By:REESE YIN  Order #:    [id number]                    Reading MD: REESE YIN MD    Measurements  Intervals                                Axis  Rate:       56                           P:          36  LA:         196                          QRS:        43  QRSD:       124                          T:          -37  QT:         484  QTc:        468    Interpretive Statements  SINUS BRADYCARDIA  RIGHT BUNDLE BRANCH BLOCK  No previous ECG available for comparison    Electronically Signed On 2017 16:19:18 PDT by REESE YIN MD     All labs reviewed by me.    RADIOLOGY  DX-FEMUR-2+ LEFT   Final Result      Comminuted displaced acute intertrochanteric  fracture of the left proximal femur.      DX-PELVIS-1 OR 2 VIEWS   Final Result      1.  Comminuted intertrochanteric acute fracture of the left proximal femur is identified.      DX-CHEST-LIMITED (1 VIEW)   Final Result      1.  Cardiomegaly.      2.  No evidence of focal consolidation.        The radiologist's interpretation of all radiological studies have been reviewed by me.    COURSE & MEDICAL DECISION MAKING  Pertinent Labs & Imaging studies reviewed. (See chart for details)        1:55 PM - Patient seen and examined at bedside. Ordered CBC, CMP, Troponin, BNP, PT/INR, APTT, DX-Pelvis to evaluate her symptoms. The differential diagnoses include but are not limited to: Hip fracture, Pelvis fracture.    3:35 PM - Paged Orthopedics.    3:40 PM - Consulted with Dr. Sen, Orthopedics, who is aware of the patient and agrees to consult. Ordered DX-Femur, DX-Chest.    3:45 PM - Patient seen at bedside and informed of the radiology results noted above that the femur is fractured. I informed the patient they will be admitted and the procedure may be performed tonight. She understands and verbalizes agreement.    4:20 PM - Paged Hospitalist.    4:31 PM - Consulted with Dr. Garza, Hospitalist, who is aware of the patient and agrees to admit.    Decision Making:  Patient is status post fall with left hip shortening, x-ray confirms fracture, discussed the case with orthopedics in the admitting doctor for admission to the hospital.    DISPOSITION:  Patient will be admitted to Dr. Garza, Hospitalist, in guarded condition.    FINAL IMPRESSION  1. Hip fracture, left, closed, initial encounter ([facility])          Vivek PEACE (Scribe), am scribing for, and in the presence of, Ras Bautista M.D..    Electronically signed by: Vivek Rodriguez (Jakeibe), 5/29/2017    Ras PEACE M.D. personally performed the services described in this documentation, as scribed by Vivek Rodriguez in my presence, and  it is both accurate and complete.    The note accurately reflects work and decisions made by me.  Ras Bautista  5/29/2017  5:55 PM

## 2017-05-29 NOTE — IP AVS SNAPSHOT
6/2/2017    Char Mcdermottjudah Moiz  [address] [place] [address]    Dear Char:    [facility] wants to ensure your discharge home is safe and you or your loved ones have had all of your questions answered regarding your care after you leave the hospital.    Below is a list of resources and contact information should you have any questions regarding your hospital stay, follow-up instructions, or active medical symptoms.    Questions or Concerns Regarding… Contact   Medical Questions Related to Your Discharge  (7 days a week, 8am-5pm) Contact a Nurse Care Coordinator   [phone]   Medical Questions Not Related to Your Discharge  (24 hours a day / 7 days a week)  Contact the Nurse Health Line   [phone]    Medications or Discharge Instructions Refer to your discharge packet   or contact your [facility] Primary Care Provider   [phone]   Follow-up Appointment(s) Schedule your appointment via [employer]onet   or contact Scheduling [phone]   Billing Review your statement via [employer]  or contact Billing [phone]   Medical Records Review your records via [employer]   or contact Medical Records [phone]     You may receive a telephone call within two days of discharge. This call is to make certain you understand your discharge instructions and have the opportunity to have any questions answered. You can also easily access your medical information, test results and upcoming appointments via the Ryonet free online health management tool. You can learn more and sign up at [email or web address]yonet. For assistance setting up your [employer]onet account, please call [phone].    Once again, we want to ensure your discharge home is safe and that you have a clear understanding of any next steps in your care. If you have any questions or concerns, please do not hesitate to contact us, we are here for you. Thank you for choosing [facility] for your healthcare needs.    Sincerely,    Your [facility] Healthcare Team

## 2017-05-29 NOTE — IP AVS SNAPSHOT
" Home Care Instructions                                                                                                                  Name:Char Rockwell  Medical Record Number:[id number]  CSN: [id number]    YOB: 1930   Age: 87 y.o.  Sex: female  HT:1.626 m (5' 4\") WT: 70.7 kg (155 lb 13.8 oz)          Admit Date: 5/29/2017     Discharge Date:   Today's Date: 6/2/2017  Attending Doctor:  Shanique Abreu M.D.                  Allergies:  Review of patient's allergies indicates no known allergies.            Discharge Instructions       Discharge Instructions    Discharged to other by [facility] with escort. Discharged via wheelchair, hospital escort: Yes.  Special equipment needed: Not Applicable    Be sure to schedule a follow-up appointment with your primary care doctor or any specialists as instructed.     Discharge Plan:   Diet Plan: Discussed  Activity Level: Discussed  Confirmed Follow up Appointment: Appointment Scheduled  Confirmed Symptoms Management: Discussed  Medication Reconciliation Updated: Yes  Influenza Vaccine Indication: Patient Refuses    I understand that a diet low in cholesterol, fat, and sodium is recommended for good health. Unless I have been given specific instructions below for another diet, I accept this instruction as my diet prescription.   Other diet: regular - lactose free    Special Instructions: Discharge instructions for the Orthopedic Patient    Follow up with Primary Care Physician within 2 weeks of discharge to home, regarding:  Review of medications and diagnostic testing.  Surveillance for medical complications.  Workup and treatment of osteoporosis, if appropriate.     -Is this a Joint Replacement patient? Yes   Total Joint Hip Replacement Discharge Instructions    Pain  - The goal is to slowly wean off the prescription pain medicine.  - Ice can be used for pain control.  20 minutes at a time is recommended, and never directly against your skin or " incision.  - Most patients are off the pain pills by 3 weeks; others may require a low level of pain medications for many months. If your pain continues to be severe, follow up with your physician.  Infection  Deep hip joint infections that require removal of the prostheses occur in less than 0.1% of patients. Lesser infections in the skin (cellulites) are more common and much more easily treated.  - Keep the incision as clean and dry as possible.  - Always wash your hands before touching your incision.  - Skin infections tend to develop around 7-10 days after surgery, most can be treated with oral antibiotics.  - Dental Care should be delayed for 3 months after surgery, your surgeon recommends taking a dose of antibiotics 1 hour prior to any dental procedure.  After 2 years, most surgeons recommend antibiotics only before an extensive procedure.  Ask your surgeon what he recommends.  - Signs and symptoms of infection can include:  low grade fever, redness, pain, swelling and drainage from your incision.  Notify your surgeon immediately if you develop any of these symptoms.  Post op Disturbances  - Bowel habits - constipation is extremely common and is caused by a combination of anesthesia, lack of mobility and pain medicine.  Use stool softeners or laxatives if necessary. It is important not to ignore this problem, as bowel obstructions can be a serious complication after joint replacement surgery.  - Mood/Energy Level - Many patients experience a lack of energy and endurance for up to 2-3 months after surgery.  Some may also feel down and can even become depressed.  This is likely due to the postoperative anemia, change in activity level, lack of sleep, pain medicine and just the emotional reaction to the surgery itself that is a big disruption in a person’s life.  This usually passes.  If symptoms persist, follow up with your primary physician.  - Returning to work - Your surgeon will give you more specific  instructions.  Generally, if you work a sedentary job requiring little standing or walking, most patients may return within 2-6 weeks.  Manual labor jobs involving walking, lifting and standing may take 3-4 months.  Your surgeon’s office can provide a release to part-time or light duty work early on in your recovery and progress you to full duty as able.  - Driving - You can begin driving an automatic shift car in 4 to 8 weeks, provided you are no longer taking narcotic pain medication. If you have a stick-shift car and your right hip was replaced, do not begin driving until your doctor says you can.   - Avoiding falls -  throw rugs and tack down loose carpeting.  Be aware of floor hazards such as pets, small objects or uneven surfaces.   -  Airport Metal Detectors - The sensitivity of metal detectors varies and it is likely that your prosthesis will cause an alarm. Inform the  that you have an artificial joint.  Diet  - Resume your normal diet as tolerated.  - It is important to achieve a healthy nutritional status by eating a well balanced diet on a regular basis.  - Your physician may recommend that you take iron and vitamin supplements.   - Continue to drink plenty of fluids.  Shower/Bathing  - You may shower as soon as you get home from the hospital unless otherwise instructed.  - Keep your incision out of water.  To keep the incision dry when showering, cover it with a plastic bag or plastic wrap.  - Pat incision dry if it gets wet.  Don’t rub.  - Do not submerge in a bath until staples are out and the incision is completely healed. (Approximately 6-8 weeks after surgery).  Dressing Change:  Procedure (if recommended by your physician)  - Wash hands.  - Open all dressing change materials.  - Remove old dressing and discard.  - Inspect incision for redness, increase in clear drainage, yellow/green drainage, odor and surrounding skin hot to touch.  -  ABD (large gauze) pad by one  corner and lay over the incision.  Be careful not to touch the inside of the dressing that will lay over the incision.  - Secure in place as instructed (Ace wrap or tape).    Swelling/Bruising  - Swelling is normal after hip replacement and can involve the thigh, knee, calf and foot.  - Swelling can last from 3-6 months.  - Elevate your leg higher than your heart while reclining.  The first week you are home you should elevate your leg an equal amount of time, as you are active.    - Anti-inflammatory pills can be taken once you have stopped the blood thinners.  - The swelling is usually worse after you go home since you are upright for longer periods of time.  - Bruising is common and can involve the entire leg including the thigh, calf and even foot.  Bruising often does not appear until after you arrive home and it can be quite dramatic- purple, black, green.  The bruising you can see is not usually concerning and will subside without any treatment.      Blood Clot Prevention  Blood clots in the legs and the less common, but frightening, clots that travel to the lungs are a real focus of our preventative. Most patients are at standard risk for them, but those patients who are at higher risk include people who have had previous clots, a family history of clotting, smoking, diabetes, obesity, advanced age, use of estrogen and a sedentary lifestyle.    - Signs of blood clots in legs - Swelling in thigh, calf or ankle that does not go down with elevation.  Pain, heat and tenderness in calf, back of calf or groin area.  NOTE: blood clots can occur in either leg.  - You have been receiving anticoagulant therapy (blood thinners) in the hospital and you may be instructed to continue at home depending on your risk factors.  - Your risk for developing a clot continues for up to 2-3 months after surgery.  You should avoid prolonged sitting and dehydration during that time (long air trips and car trips).  If you do take a  trip during this time, please get up and move around every 1- 1.5 hours.  - If you are prescribed blood thinning medication for home, follow instructions as directed. (Handouts provided if applicable).      Activity    Once you get home, you should stay active. The key is not to overdo it! While you can expect some good days and some bad days, you should notice a gradual improvement over time you should notice a gradual improvement and a gradual increase in your endurance over the next 6 to 12 months.    - Weight Bearing - If you have undergone cemented or hybrid hip replacement, you can put some weight on the leg immediately using a cane or walker, and you should continue to use some support for 4 to 6 weeks to help the muscles recover.   - Sleeping Positions - Sleep on your back with your legs slightly apart or on your side with a regular pillow between your knees. Be sure to use the pillow for at least 6 weeks, or until your doctor says you can do without it. Sleeping on your stomach should be all right  - Sitting - For at least the first 3 months, sit only in chairs that have arms. Do not sit on low chairs, low stools, or reclining chairs. Do not cross your legs at the knees. The physical therapist will show you how to sit and stand from a chair, keeping your affected leg out in front of you. Get up and move around on a regular basis--at least once every hour.  - Walking - Walk as much as you like once your doctor gives you the go-ahead, but remember that walking is no substitute for your prescribed exercises. Walking with a pair of trekking poles is helpful and adds as much as 40% to the exercise you get when you walk  - Therapy may be needed in some cases, to strengthen your muscles and improve your gait (walking pattern).  This decision will be made at your post-operative appointment.  Follow your therapist recommended post-operative exercises (handout provided by Therapist).  - Swimming is also recommended;  you can begin as soon as the sutures have been removed and the wound is healed, approximately 6 to 8 weeks after surgery. Using a pair of training fins may make swimming a more enjoyable and effective exercise.  - Other activities - Lower impact activities are preferred.  If you have specific questions, consult your Surgeon.    - Sexual activity - Your surgeon can tell you when it should be safe to resume sexual activity.      When to Call the Doctor   Call the physician if:   - Fever over 100.5? F  - Increased pain, drainage, redness, odor or heat around the incision area  - Shaking chills  - Increased knee pain with activity and rest  - Increased pain in calf, tenderness or redness above or below the knee  - Increased swelling of calf, ankle, foot  - Sudden increased shortness of breath, sudden onset of chest pain, localized chest pain with coughing  - Incision opening  Or, if there are any questions or concerns about medications or care.       -Is this patient being discharged with medication to prevent blood clots?  Yes, Aspirin Aspirin, ASA oral tablets  What is this medicine?  ASPIRIN (AS pir in) is a pain reliever. It is used to treat mild pain and fever. This medicine is also used as directed by a doctor to prevent and to treat heart attacks, to prevent strokes, and to treat arthritis or inflammation.  This medicine may be used for other purposes; ask your health care provider or pharmacist if you have questions.  COMMON BRAND NAME(S): Aspir-Low, Aspir-Izzy , Aspirtab , [employer] Advanced Aspirin, [employer] Aspirin Extra Strength, [employer] Aspirin Plus, [employer] Aspirin, [employer] Genuine Aspirin, [employer] Womens Aspirin , Bufferin Extra Strength, Bufferin Low Dose, Bufferin  What should I tell my health care provider before I take this medicine?  They need to know if you have any of these conditions:  -anemia  -asthma  -bleeding problems  -child with chickenpox, the flu, or other viral infection  -diabetes  -gout  -if you  frequently drink alcohol containing drinks  -kidney disease  -liver disease  -low level of vitamin K  -lupus  -smoke tobacco  -stomach ulcers or other problems  -an unusual or allergic reaction to aspirin, tartrazine dye, other medicines, dyes, or preservatives  -pregnant or trying to get pregnant  -breast-feeding  How should I use this medicine?  Take this medicine by mouth with a glass of water. Follow the directions on the package or prescription label. You can take this medicine with or without food. If it upsets your stomach, take it with food. Do not take your medicine more often than directed.  Talk to your pediatrician regarding the use of this medicine in children. While this drug may be prescribed for children as young as 12 years of age for selected conditions, precautions do apply. Children and teenagers should not use this medicine to treat chicken pox or flu symptoms unless directed by a doctor.  Patients over 65 years old may have a stronger reaction and need a smaller dose.  Overdosage: If you think you have taken too much of this medicine contact a poison control center or emergency room at once.  NOTE: This medicine is only for you. Do not share this medicine with others.  What if I miss a dose?  If you are taking this medicine on a regular schedule and miss a dose, take it as soon as you can. If it is almost time for your next dose, take only that dose. Do not take double or extra doses.  What may interact with this medicine?  Do not take this medicine with any of the following medications:  -cidofovir  -ketorolac  -probenecid  This medicine may also interact with the following medications:  -alcohol  -alendronate  -bismuth subsalicylate  -flavocoxid  -herbal supplements like feverfew, garlic, salvador, ginkgo biloba, horse chestnut  -medicines for diabetes or glaucoma like acetazolamide, methazolamide  -medicines for gout  -medicines that treat or prevent blood clots like enoxaparin, heparin,  ticlopidine, warfarin  -other aspirin and aspirin-like medicines  -NSAIDs, medicines for pain and inflammation, like ibuprofen or naproxen  -pemetrexed  -sulfinpyrazone  -varicella live vaccine  This list may not describe all possible interactions. Give your health care provider a list of all the medicines, herbs, non-prescription drugs, or dietary supplements you use. Also tell them if you smoke, drink alcohol, or use illegal drugs. Some items may interact with your medicine.  What should I watch for while using this medicine?  If you are treating yourself for pain, tell your doctor or health care professional if the pain lasts more than 10 days, if it gets worse, or if there is a new or different kind of pain. Tell your doctor if you see redness or swelling. Also, check with your doctor if you have a fever that lasts for more than 3 days. Only take this medicine to prevent heart attacks or blood clotting if prescribed by your doctor or health care professional.  Do not take aspirin or aspirin-like medicines with this medicine. Too much aspirin can be dangerous. Always read the labels carefully.  This medicine can irritate your stomach or cause bleeding problems. Do not smoke cigarettes or drink alcohol while taking this medicine. Do not lie down for 30 minutes after taking this medicine to prevent irritation to your throat.  If you are scheduled for any medical or dental procedure, tell your healthcare provider that you are taking this medicine. You may need to stop taking this medicine before the procedure.  What side effects may I notice from receiving this medicine?  Side effects that you should report to your doctor or health care professional as soon as possible:  -allergic reactions like skin rash, itching or hives, swelling of the face, lips, or tongue  -black, tarry stools  -bloody, coffee ground-like vomit  -breathing problems  -changes in hearing, ringing in the ears  -confusion  -general ill feeling or  flu-like symptoms  -pain on swallowing  -redness, blistering, peeling or loosening of the skin, including inside the mouth or nose  -trouble passing urine or change in the amount of urine  -unusual bleeding or bruising  -unusually weak or tired  -yellowing of the eyes or skin  Side effects that usually do not require medical attention (report to your doctor or health care professional if they continue or are bothersome):  -diarrhea or constipation  -nausea, vomiting  -stomach gas, heartburn  This list may not describe all possible side effects. Call your doctor for medical advice about side effects. You may report side effects to FDA at 9-189-GJB-7390.  Where should I keep my medicine?  Keep out of the reach of children.  Store at room temperature between 15 and 30 degrees C (59 and 86 degrees F). Protect from heat and moisture. Do not use this medicine if it has a strong vinegar smell. Throw away any unused medicine after the expiration date.  NOTE: This sheet is a summary. It may not cover all possible information. If you have questions about this medicine, talk to your doctor, pharmacist, or health care provider.  © 2014, [employer]/Gold Standard. (3/10/2009 10:44:17 AM)      · Is patient discharged on Warfarin / Coumadin?   No     · Is patient Post Blood Transfusion?  No    Depression / Suicide Risk    As you are discharged from this [facility] facility, it is important to learn how to keep safe from harming yourself.    Recognize the warning signs:  · Abrupt changes in personality, positive or negative- including increase in energy   · Giving away possessions  · Change in eating patterns- significant weight changes-  positive or negative  · Change in sleeping patterns- unable to sleep or sleeping all the time   · Unwillingness or inability to communicate  · Depression  · Unusual sadness, discouragement and loneliness  · Talk of wanting to die  · Neglect of personal appearance   · Rebelliousness- reckless  behavior  · Withdrawal from people/activities they love  · Confusion- inability to concentrate     If you or a loved one observes any of these behaviors or has concerns about self-harm, here's what you can do:  · Talk about it- your feelings and reasons for harming yourself  · Remove any means that you might use to hurt yourself (examples: pills, rope, extension cords, firearm)  · Get professional help from the community (Mental Health, Substance Abuse, psychological counseling)  · Do not be alone:Call your Safe Contact- someone whom you trust who will be there for you.  · Call your local CRISIS HOTLINE 721-4467 or 477-950-6153  · Call your local Children's Mobile Crisis Response Team [place] (104) 191-1709 or www.[facility]  · Call the toll free National Suicide Prevention Hotlines   · National Suicide Prevention Lifeline 360-470-UUDP (3774)  · National Hope Line Network 800-SUICIDE (995-1597)        Your appointments     Jul 19, 2017  1:20 PM   NEW TO YOU with Doug Lewis M.D.   [facility])    [address]  [address]  [email or web address] [id number] [phone]              Follow-up Information     1. Follow up with Zach Schmidt M.D.. Go on 6/14/2017.    Specialty:  Orthopaedics    Why:  Please arrive at 9:15am for your appointment.    Contact information    [address]  [email or web address] [address]  [phone]          2. Follow up with Zion Pimentel M.D.. Go on 7/10/2017.    Specialty:  Orthopaedics    Why:  Please arrive at 9:30am for your appointment.    Contact information    [address]  [address]  [email or web address] [address]  [phone]           Discharge Medication Instructions:    Below are the medications your physician expects you to take upon discharge:    Review all your home medications and newly ordered medications with your doctor and/or pharmacist. Follow medication instructions as directed by your doctor and/or pharmacist.    Please keep  your medication list with you and share with your physician.               Medication List      START taking these medications        Instructions    Morning Afternoon Evening Bedtime    acetaminophen 325 MG Tabs   Last time this was given:  650 mg on 6/2/2017  1:09 PM   Commonly known as:  TYLENOL   Next Dose Due:  6/2 6pm        Take 2 Tabs by mouth every 6 hours.   Dose:  650 mg                        aspirin EC 81 MG Tbec   Commonly known as:  ECOTRIN   Next Dose Due:  6/2 6pm        Take 1 Tab by mouth 2 times a day, with meals for 42 days.   Dose:  81 mg                        metoprolol 25 MG Tabs   Last time this was given:  12.5 mg on 6/2/2017  6:35 AM   Commonly known as:  LOPRESSOR   Next Dose Due:  6/2 9pm        Take 0.5 Tabs by mouth 2 Times a Day.   Dose:  12.5 mg                        senna-docusate 8.6-50 MG Tabs   Last time this was given:  2 Tabs on 6/2/2017  8:57 AM   Commonly known as:  PERICOLACE or SENOKOT S        Take 2 Tabs by mouth every day.   Dose:  2 Tab                        tramadol 50 MG Tabs   Commonly known as:  ULTRAM        Take 1-2 Tabs by mouth every 6 hours as needed for Moderate Pain or Severe Pain.   Dose:   mg                             Where to Get Your Medications      Information about where to get these medications is not yet available     ! Ask your nurse or doctor about these medications    - acetaminophen 325 MG Tabs  - aspirin EC 81 MG Tbec  - metoprolol 25 MG Tabs  - senna-docusate 8.6-50 MG Tabs  - tramadol 50 MG Tabs            Orders for after discharge     REFERRAL TO SKILLED NURSING FACILITY    Complete by:  As directed    SNF for 7-10 days per [facility] protocol             Instructions           Diet / Nutrition:    Follow any diet instructions given to you by your doctor or the dietician, including how much salt (sodium) you are allowed each day.    If you are overweight, talk to your doctor about a weight reduction plan.    Activity:    Remain  physically active following your doctor's instructions about exercise and activity.    Rest often.     Any time you become even a little tired or short of breath, SIT DOWN and rest.    Worsening Symptoms:    Report any of the following signs and symptoms to the doctor's office immediately:    *Pain of jaw, arm, or neck  *Chest pain not relieved by medication                               *Dizziness or loss of consciousness  *Difficulty breathing even when at rest   *More tired than usual                                       *Bleeding drainage or swelling of surgical site  *Swelling of feet, ankles, legs or stomach                 *Fever (>100ºF)  *Pink or blood tinged sputum  *Weight gain (3lbs/day or 5lbs /week)           *Shock from internal defibrillator (if applicable)  *Palpitations or irregular heartbeats                *Cool and/or numb extremities    Stroke Awareness    Common Risk Factors for Stroke include:    Age  Atrial Fibrillation  Carotid Artery Stenosis  Diabetes Mellitus  Excessive alcohol consumption  High blood pressure  Overweight   Physical inactivity  Smoking    Warning signs and symptoms of a stroke include:    *Sudden numbness or weakness of the face, arm or leg (especially on one side of the body).  *Sudden confusion, trouble speaking or understanding.  *Sudden trouble seeing in one or both eyes.  *Sudden trouble walking, dizziness, loss of balance or coordination.Sudden severe headache with no known cause.    It is very important to get treatment quickly when a stroke occurs. If you experience any of the above warning signs, call 911 immediately.                   Disclaimer         Quit Smoking / Tobacco Use:    I understand the use of any tobacco products increases my chance of suffering from future heart disease or stroke and could cause other illnesses which may shorten my life. Quitting the use of tobacco products is the single most important thing I can do to improve my health. For  further information on smoking / tobacco cessation call a Toll Free Quit Line at 1-388.619.7587 (*National Cancer [place]) or 1-926.303.8023 (American Lung Association) or you can access the web based program at www.lungusa.org.    [place] Tobacco Users Help Line:  (320) 339-9921       Toll Free: 1-984.267.1133  Quit Tobacco Program [facility] (829)742-1194    Crisis Hotline:    [place] Crisis Hotline:  3-347-OVRKAGT or 1-840.119.3373    [place] Crisis Hotline:    1-441.139.4699 or 633-959-0865    Discharge Survey:   Thank you for choosing [facility]. We hope we did everything we could to make your hospital stay a pleasant one. You may be receiving a phone survey and we would appreciate your time and participation in answering the questions. Your input is very valuable to us in our efforts to improve our service to our patients and their families.        My signature on this form indicates that:    1. I have reviewed and understand the above information.  2. My questions regarding this information have been answered to my satisfaction.  3. I have formulated a plan with my discharge nurse to obtain my prescribed medications for home.                  Disclaimer         __________________________________                     __________       ________                       Patient Signature                                                 Date                    Time

## 2017-05-29 NOTE — IP AVS SNAPSHOT
" <p align=\"LEFT\"><IMG SRC=\"//[email or web address]$/[email or web address]\" alt=\"Image\" WIDTH=\"50%\" HEIGHT=\"200\" BORDER=\"\"></p>                   Name:Char Rockwell  Medical Record Number:[id number]  CSN: [id number]    YOB: 1930   Age: 87 y.o.  Sex: female  HT:1.626 m (5' 4\") WT: 70.7 kg (155 lb 13.8 oz)          Admit Date: 5/29/2017     Discharge Date:   Today's Date: 6/2/2017  Attending Doctor:  Shanique Abreu M.D.                  Allergies:  Review of patient's allergies indicates no known allergies.          Your appointments     Jul 19, 2017  1:20 PM   NEW TO YOU with Doug Lewis M.D.   [facility]    [address]  [address]  [facility] [id number] [phone]              Follow-up Information     1. Follow up with Zach Schmidt M.D.. Go on 6/14/2017.    Specialty:  Orthopaedics    Why:  Please arrive at 9:15am for your appointment.    Contact information    [address]  [facility] [address]  [phone]          2. Follow up with Zion Pimentel M.D.. Go on 7/10/2017.    Specialty:  Orthopaedics    Why:  Please arrive at 9:30am for your appointment.    Contact information    [address]  [address]  [facility] [address]  [phone]           Medication List      Take these Medications        Instructions    acetaminophen 325 MG Tabs   Commonly known as:  TYLENOL    Take 2 Tabs by mouth every 6 hours.   Dose:  650 mg       aspirin EC 81 MG Tbec   Commonly known as:  ECOTRIN    Take 1 Tab by mouth 2 times a day, with meals for 42 days.   Dose:  81 mg       metoprolol 25 MG Tabs   Commonly known as:  LOPRESSOR    Take 0.5 Tabs by mouth 2 Times a Day.   Dose:  12.5 mg       senna-docusate 8.6-50 MG Tabs   Commonly known as:  PERICOLACE or SENOKOT S    Take 2 Tabs by mouth every day.   Dose:  2 Tab       tramadol 50 MG Tabs   Commonly known as:  ULTRAM    Take 1-2 Tabs by mouth every 6 hours as needed for Moderate Pain or Severe Pain.   Dose:  "  mg

## 2017-05-30 ENCOUNTER — APPOINTMENT (OUTPATIENT)
Dept: RADIOLOGY | Facility: MEDICAL CENTER | Age: 82
DRG: 481 | End: 2017-05-30
Attending: ORTHOPAEDIC SURGERY
Payer: MEDICARE
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
## 2017-05-30 PROBLEM — D64.9 ANEMIA: Status: ACTIVE | Noted: 2017-05-30

## 2017-05-30 PROBLEM — I95.9 HYPOTENSION: Status: ACTIVE | Noted: 2017-05-30

## 2017-05-30 PROBLEM — R73.9 HYPERGLYCEMIA: Status: ACTIVE | Noted: 2017-05-30

## 2017-05-30 LAB
ALBUMIN SERPL BCP-MCNC: 2.8 G/DL (ref 3.2–4.9)
ALBUMIN/GLOB SERPL: 1.3 G/DL
ALP SERPL-CCNC: 57 U/L (ref 30–99)
ALT SERPL-CCNC: 14 U/L (ref 2–50)
ANION GAP SERPL CALC-SCNC: 8 MMOL/L (ref 0–11.9)
ANION GAP SERPL CALC-SCNC: 8 MMOL/L (ref 0–11.9)
AST SERPL-CCNC: 13 U/L (ref 12–45)
BASOPHILS # BLD AUTO: 0.4 % (ref 0–1.8)
BASOPHILS # BLD: 0.04 K/UL (ref 0–0.12)
BILIRUB SERPL-MCNC: 0.5 MG/DL (ref 0.1–1.5)
BUN SERPL-MCNC: 17 MG/DL (ref 8–22)
BUN SERPL-MCNC: 20 MG/DL (ref 8–22)
CALCIUM SERPL-MCNC: 7.5 MG/DL (ref 8.5–10.5)
CALCIUM SERPL-MCNC: 8.3 MG/DL (ref 8.5–10.5)
CHLORIDE SERPL-SCNC: 105 MMOL/L (ref 96–112)
CHLORIDE SERPL-SCNC: 106 MMOL/L (ref 96–112)
CO2 SERPL-SCNC: 22 MMOL/L (ref 20–33)
CO2 SERPL-SCNC: 22 MMOL/L (ref 20–33)
CREAT SERPL-MCNC: 0.64 MG/DL (ref 0.5–1.4)
CREAT SERPL-MCNC: 0.95 MG/DL (ref 0.5–1.4)
EOSINOPHIL # BLD AUTO: 0 K/UL (ref 0–0.51)
EOSINOPHIL NFR BLD: 0 % (ref 0–6.9)
ERYTHROCYTE [DISTWIDTH] IN BLOOD BY AUTOMATED COUNT: 42.4 FL (ref 35.9–50)
ERYTHROCYTE [DISTWIDTH] IN BLOOD BY AUTOMATED COUNT: 45.1 FL (ref 35.9–50)
EST. AVERAGE GLUCOSE BLD GHB EST-MCNC: 117 MG/DL
GFR SERPL CREATININE-BSD FRML MDRD: 56 ML/MIN/1.73 M 2
GFR SERPL CREATININE-BSD FRML MDRD: >60 ML/MIN/1.73 M 2
GLOBULIN SER CALC-MCNC: 2.1 G/DL (ref 1.9–3.5)
GLUCOSE BLD-MCNC: 207 MG/DL (ref 65–99)
GLUCOSE SERPL-MCNC: 136 MG/DL (ref 65–99)
GLUCOSE SERPL-MCNC: 209 MG/DL (ref 65–99)
HBA1C MFR BLD: 5.7 % (ref 0–5.6)
HCT VFR BLD AUTO: 25.9 % (ref 37–47)
HCT VFR BLD AUTO: 27.4 % (ref 37–47)
HCT VFR BLD AUTO: 32.8 % (ref 37–47)
HGB BLD-MCNC: 11 G/DL (ref 12–16)
HGB BLD-MCNC: 8.4 G/DL (ref 12–16)
HGB BLD-MCNC: 8.7 G/DL (ref 12–16)
IMM GRANULOCYTES # BLD AUTO: 0.04 K/UL (ref 0–0.11)
IMM GRANULOCYTES NFR BLD AUTO: 0.4 % (ref 0–0.9)
LYMPHOCYTES # BLD AUTO: 0.84 K/UL (ref 1–4.8)
LYMPHOCYTES NFR BLD: 9.3 % (ref 22–41)
MAGNESIUM SERPL-MCNC: 1.9 MG/DL (ref 1.5–2.5)
MCH RBC QN AUTO: 29.6 PG (ref 27–33)
MCH RBC QN AUTO: 29.9 PG (ref 27–33)
MCHC RBC AUTO-ENTMCNC: 32 G/DL (ref 33.6–35)
MCHC RBC AUTO-ENTMCNC: 33.3 G/DL (ref 33.6–35)
MCV RBC AUTO: 88.9 FL (ref 81.4–97.8)
MCV RBC AUTO: 93.2 FL (ref 81.4–97.8)
MONOCYTES # BLD AUTO: 0.52 K/UL (ref 0–0.85)
MONOCYTES NFR BLD AUTO: 5.7 % (ref 0–13.4)
NEUTROPHILS # BLD AUTO: 7.61 K/UL (ref 2–7.15)
NEUTROPHILS NFR BLD: 84.2 % (ref 44–72)
NRBC # BLD AUTO: 0 K/UL
NRBC BLD AUTO-RTO: 0 /100 WBC
PLATELET # BLD AUTO: 157 K/UL (ref 164–446)
PLATELET # BLD AUTO: 182 K/UL (ref 164–446)
PMV BLD AUTO: 10 FL (ref 9–12.9)
PMV BLD AUTO: 9.7 FL (ref 9–12.9)
POTASSIUM SERPL-SCNC: 4 MMOL/L (ref 3.6–5.5)
POTASSIUM SERPL-SCNC: 4.2 MMOL/L (ref 3.6–5.5)
PROT SERPL-MCNC: 4.9 G/DL (ref 6–8.2)
RBC # BLD AUTO: 2.78 M/UL (ref 4.2–5.4)
RBC # BLD AUTO: 3.68 M/UL (ref 4.2–5.4)
SODIUM SERPL-SCNC: 135 MMOL/L (ref 135–145)
SODIUM SERPL-SCNC: 136 MMOL/L (ref 135–145)
WBC # BLD AUTO: 10.8 K/UL (ref 4.8–10.8)
WBC # BLD AUTO: 9.1 K/UL (ref 4.8–10.8)

## 2017-05-30 PROCEDURE — 85014 HEMATOCRIT: CPT

## 2017-05-30 PROCEDURE — 80053 COMPREHEN METABOLIC PANEL: CPT

## 2017-05-30 PROCEDURE — 73552 X-RAY EXAM OF FEMUR 2/>: CPT | Mod: LT

## 2017-05-30 PROCEDURE — 500891 HCHG PACK, ORTHO MAJOR: Performed by: ORTHOPAEDIC SURGERY

## 2017-05-30 PROCEDURE — 700105 HCHG RX REV CODE 258: Performed by: INTERNAL MEDICINE

## 2017-05-30 PROCEDURE — 160048 HCHG OR STATISTICAL LEVEL 1-5: Performed by: ORTHOPAEDIC SURGERY

## 2017-05-30 PROCEDURE — 502000 HCHG MISC OR IMPLANTS RC 0278: Performed by: ORTHOPAEDIC SURGERY

## 2017-05-30 PROCEDURE — 82962 GLUCOSE BLOOD TEST: CPT

## 2017-05-30 PROCEDURE — 700102 HCHG RX REV CODE 250 W/ 637 OVERRIDE(OP): Performed by: INTERNAL MEDICINE

## 2017-05-30 PROCEDURE — 700111 HCHG RX REV CODE 636 W/ 250 OVERRIDE (IP)

## 2017-05-30 PROCEDURE — 110371 HCHG SHELL REV 272: Performed by: ORTHOPAEDIC SURGERY

## 2017-05-30 PROCEDURE — A9270 NON-COVERED ITEM OR SERVICE: HCPCS | Performed by: ORTHOPAEDIC SURGERY

## 2017-05-30 PROCEDURE — 700102 HCHG RX REV CODE 250 W/ 637 OVERRIDE(OP): Performed by: ORTHOPAEDIC SURGERY

## 2017-05-30 PROCEDURE — 502240 HCHG MISC OR SUPPLY RC 0272: Performed by: ORTHOPAEDIC SURGERY

## 2017-05-30 PROCEDURE — 700111 HCHG RX REV CODE 636 W/ 250 OVERRIDE (IP): Performed by: ORTHOPAEDIC SURGERY

## 2017-05-30 PROCEDURE — 160029 HCHG SURGERY MINUTES - 1ST 30 MINS LEVEL 4: Performed by: ORTHOPAEDIC SURGERY

## 2017-05-30 PROCEDURE — 160041 HCHG SURGERY MINUTES - EA ADDL 1 MIN LEVEL 4: Performed by: ORTHOPAEDIC SURGERY

## 2017-05-30 PROCEDURE — 99233 SBSQ HOSP IP/OBS HIGH 50: CPT | Performed by: INTERNAL MEDICINE

## 2017-05-30 PROCEDURE — 93010 ELECTROCARDIOGRAM REPORT: CPT | Performed by: INTERNAL MEDICINE

## 2017-05-30 PROCEDURE — 160035 HCHG PACU - 1ST 60 MINS PHASE I: Performed by: ORTHOPAEDIC SURGERY

## 2017-05-30 PROCEDURE — 85027 COMPLETE CBC AUTOMATED: CPT

## 2017-05-30 PROCEDURE — 501838 HCHG SUTURE GENERAL: Performed by: ORTHOPAEDIC SURGERY

## 2017-05-30 PROCEDURE — 85025 COMPLETE CBC W/AUTO DIFF WBC: CPT

## 2017-05-30 PROCEDURE — A9270 NON-COVERED ITEM OR SERVICE: HCPCS | Performed by: INTERNAL MEDICINE

## 2017-05-30 PROCEDURE — 770006 HCHG ROOM/CARE - MED/SURG/GYN SEMI*

## 2017-05-30 PROCEDURE — 83735 ASSAY OF MAGNESIUM: CPT

## 2017-05-30 PROCEDURE — 700102 HCHG RX REV CODE 250 W/ 637 OVERRIDE(OP)

## 2017-05-30 PROCEDURE — 160009 HCHG ANES TIME/MIN: Performed by: ORTHOPAEDIC SURGERY

## 2017-05-30 PROCEDURE — A6402 STERILE GAUZE <= 16 SQ IN: HCPCS | Performed by: ORTHOPAEDIC SURGERY

## 2017-05-30 PROCEDURE — 36415 COLL VENOUS BLD VENIPUNCTURE: CPT

## 2017-05-30 PROCEDURE — A9270 NON-COVERED ITEM OR SERVICE: HCPCS

## 2017-05-30 PROCEDURE — 700111 HCHG RX REV CODE 636 W/ 250 OVERRIDE (IP): Performed by: INTERNAL MEDICINE

## 2017-05-30 PROCEDURE — 160036 HCHG PACU - EA ADDL 30 MINS PHASE I: Performed by: ORTHOPAEDIC SURGERY

## 2017-05-30 PROCEDURE — 83036 HEMOGLOBIN GLYCOSYLATED A1C: CPT

## 2017-05-30 PROCEDURE — 0QS736Z REPOSITION LEFT UPPER FEMUR WITH INTRAMEDULLARY INTERNAL FIXATION DEVICE, PERCUTANEOUS APPROACH: ICD-10-PCS | Performed by: ORTHOPAEDIC SURGERY

## 2017-05-30 PROCEDURE — 80048 BASIC METABOLIC PNL TOTAL CA: CPT

## 2017-05-30 PROCEDURE — 160002 HCHG RECOVERY MINUTES (STAT): Performed by: ORTHOPAEDIC SURGERY

## 2017-05-30 PROCEDURE — 85018 HEMOGLOBIN: CPT

## 2017-05-30 PROCEDURE — 93005 ELECTROCARDIOGRAM TRACING: CPT | Performed by: INTERNAL MEDICINE

## 2017-05-30 DEVICE — NAIL HIP 127.5 DEGREE 11MM X 380MM LT (4TX1=4): Type: IMPLANTABLE DEVICE | Site: HIP | Status: FUNCTIONAL

## 2017-05-30 DEVICE — SCREW CROSS LOCK 5MM X 47.5MM (4TX5=20): Type: IMPLANTABLE DEVICE | Site: HIP | Status: FUNCTIONAL

## 2017-05-30 DEVICE — SCREW LAG 10.5MM X 100MM (4TX2=8): Type: IMPLANTABLE DEVICE | Site: HIP | Status: FUNCTIONAL

## 2017-05-30 RX ORDER — OXYCODONE HYDROCHLORIDE AND ACETAMINOPHEN 5; 325 MG/1; MG/1
TABLET ORAL
Status: COMPLETED
Start: 2017-05-30 | End: 2017-05-30

## 2017-05-30 RX ORDER — SODIUM CHLORIDE 9 MG/ML
30 INJECTION, SOLUTION INTRAVENOUS ONCE
Status: COMPLETED | OUTPATIENT
Start: 2017-05-30 | End: 2017-05-30

## 2017-05-30 RX ORDER — ACETAMINOPHEN 325 MG/1
650 TABLET ORAL EVERY 6 HOURS
Status: DISCONTINUED | OUTPATIENT
Start: 2017-05-30 | End: 2017-06-02 | Stop reason: HOSPADM

## 2017-05-30 RX ORDER — ONDANSETRON 2 MG/ML
INJECTION INTRAMUSCULAR; INTRAVENOUS
Status: COMPLETED
Start: 2017-05-30 | End: 2017-05-30

## 2017-05-30 RX ORDER — SODIUM CHLORIDE 9 MG/ML
INJECTION, SOLUTION INTRAVENOUS CONTINUOUS
Status: DISPENSED | OUTPATIENT
Start: 2017-05-30 | End: 2017-05-31

## 2017-05-30 RX ADMIN — SODIUM CHLORIDE 1632 ML: 9 INJECTION, SOLUTION INTRAVENOUS at 18:00

## 2017-05-30 RX ADMIN — SODIUM CHLORIDE: 9 INJECTION, SOLUTION INTRAVENOUS at 19:59

## 2017-05-30 RX ADMIN — ONDANSETRON 4 MG: 2 INJECTION INTRAMUSCULAR; INTRAVENOUS at 12:39

## 2017-05-30 RX ADMIN — ACETAMINOPHEN 650 MG: 325 TABLET, FILM COATED ORAL at 23:40

## 2017-05-30 RX ADMIN — OXYCODONE AND ACETAMINOPHEN 1 TABLET: 5; 325 TABLET ORAL at 12:41

## 2017-05-30 RX ADMIN — OXYCODONE HYDROCHLORIDE 5 MG: 5 TABLET ORAL at 03:25

## 2017-05-30 RX ADMIN — CEFAZOLIN SODIUM 1 G: 1 INJECTION, SOLUTION INTRAVENOUS at 18:16

## 2017-05-30 RX ADMIN — OXYCODONE AND ACETAMINOPHEN 1 TABLET: 5; 325 TABLET ORAL at 11:57

## 2017-05-30 RX ADMIN — SODIUM CHLORIDE: 9 INJECTION, SOLUTION INTRAVENOUS at 14:15

## 2017-05-30 RX ADMIN — ACETAMINOPHEN 650 MG: 325 TABLET, FILM COATED ORAL at 18:06

## 2017-05-30 RX ADMIN — ONDANSETRON 4 MG: 4 TABLET, ORALLY DISINTEGRATING ORAL at 03:25

## 2017-05-30 RX ADMIN — ACETAMINOPHEN 650 MG: 325 TABLET, FILM COATED ORAL at 14:15

## 2017-05-30 ASSESSMENT — PAIN SCALES - GENERAL
PAINLEVEL_OUTOF10: ASSUMED PAIN PRESENT
PAINLEVEL_OUTOF10: 1
PAINLEVEL_OUTOF10: 2
PAINLEVEL_OUTOF10: 4
PAINLEVEL_OUTOF10: 4
PAINLEVEL_OUTOF10: 0
PAINLEVEL_OUTOF10: 4
PAINLEVEL_OUTOF10: 2
PAINLEVEL_OUTOF10: 1
PAINLEVEL_OUTOF10: 0
PAINLEVEL_OUTOF10: 4
PAINLEVEL_OUTOF10: 2

## 2017-05-30 ASSESSMENT — ENCOUNTER SYMPTOMS
ABDOMINAL PAIN: 0
FOCAL WEAKNESS: 0
SPUTUM PRODUCTION: 0
HEADACHES: 0
COUGH: 0
TREMORS: 0
DOUBLE VISION: 0
FEVER: 0
BACK PAIN: 0
DIAPHORESIS: 0
MYALGIAS: 1
PALPITATIONS: 0
DIZZINESS: 0
SHORTNESS OF BREATH: 0
NERVOUS/ANXIOUS: 0
BLURRED VISION: 0
CHILLS: 0
FLANK PAIN: 0
WEAKNESS: 1
NAUSEA: 0

## 2017-05-30 NOTE — OR NURSING
Pt A&Ox4. VSS on 2 LNC. Pt reports pain tolerable at 3-4/10. Pt states Ice application is helpful. Pt denies nausea. Report called to inpatient RN.

## 2017-05-30 NOTE — PROGRESS NOTES
Pt given po oxycodone and odt zofran this am for pain with good results. Pt denies nausea at this time.  In the future, patient wants zofran given with pain medication to prevent nausea.   Spouse at bedside.  Pt's personal cellphone taken downstairs to charging station per pt request.

## 2017-05-30 NOTE — CARE PLAN
Problem: Safety  Goal: Will remain free from injury  Outcome: PROGRESSING AS EXPECTED  Bed in the lowest locked position. Call light within reach. Bed alarm in use. Hourly rounding in place.     Problem: Venous Thromboembolism (VTW)/Deep Vein Thrombosis (DVT) Prevention:  Goal: Patient will participate in Venous Thrombosis (VTE)/Deep Vein Thrombosis (DVT)Prevention Measures  Outcome: PROGRESSING AS EXPECTED  SCD's in place and in use    Problem: Pain Management  Goal: Pain level will decrease to patient’s comfort goal  Pt LLE repositioned and ice pack place for pain control    Problem: Mobility  Goal: Risk for activity intolerance will decrease  Outcome: PROGRESSING AS EXPECTED  Pt bedrest, pending surgery today

## 2017-05-30 NOTE — PROGRESS NOTES
Left intertrochanteric hip fx  Asked by Dr. Liu/Mckinley to oversee orthopaedic care  Recommended IMN  Discussed with patient and family  Left hip marked  Consent signed

## 2017-05-30 NOTE — PROGRESS NOTES
Pt arrived to unit. A&Ox4. Denies pain, ice pack in place to L hip. Dressing CDI. CMS intact. COUGHLIN. Francisco in place. Denies nausea tolerating diet. SCD's placed. Pt and family oriented to unit and updated on POC. Both agreeable. Fall precaution in place, call light within reach. Hourly rounding in place.

## 2017-05-30 NOTE — OP REPORT
DATE OF SERVICE:  05/30/2017    PREOPERATIVE DIAGNOSES:  Left intertrochanteric hip fracture.    POSTOPERATIVE DIAGNOSIS:  Left intertrochanteric hip fracture.    SURGICAL PROCEDURES:  Intramedullary nailing of left hip.    SURGEON:  Zion Pimentel MD    ASSISTANT:  Zach Schmidt MD    ANESTHESIOLOGIST:  Paul Richmond DO    ANESTHETIC:  General.    ESTIMATED BLOOD LOSS:  25 mL.    INDICATIONS:  The patient is 87 years old.  She had a ground level fall   yesterday.  Had a left intertrochanteric hip fracture.  Dr. Liu and Dr. Sen asked me to oversee her orthopedic care.  We recommended intramedullary   nailing.  Risks include bleeding, infection, neurovascular injury, pain,   stiffness, arthritis, nonunion, malunion, fracture, thromboembolic phenomena,   anesthetic and medical complications, etc.    PROCEDURE:  The patient was identified in the preoperative holding area.  Her   left leg was marked.  She was taken to the operating room where general   anesthetic was administered.  Intravenous antibiotics were given.  She was   placed supine on the fracture table.  Feet were padded and placed in the foot   rest.  Legs were scissored.  Traction and internal rotation applied to left   lower extremity.  Fluoroscopic images showed near anatomic alignment.  The   lateral hip, thigh, and knee were then prepped and draped in sterile fashion.    Time-out was held to confirm the patient identity and correct surgical site.    A terminally threaded guidewire was inserted to the tip of the greater   trochanter and then inserted into the proximal femur in line with the canal.    I then made an incision around the wire and passed the starter reamer over the   wire down to the level of lesser trochanter.  A ball tip guide wire passed   down the medullary canal to the distal femur.  We measured the length and we   passed a 12.5 mm reamer and then inserted an [facility] 380x11 mm hip nail.  I   inserted the guidewire into the near  center-center position of the femoral   head through the interlocking sleeve.  We checked this on AP and lateral   fluoroscopic views.  We then used the cannulated drill over the guidewire and   then placed a 95 mm lag screw.  A small amount of compression was   administered.  I then tightened the set screw.  We removed the jig.  The nail   was locked distally with a single interlocking screw after stab incision and   predrilling.  Fluoroscopic image showed good interlocking screw placement.    The wounds were irrigated.  They were closed with 2-0 Vicryl and staples.    Dressings were applied.  The patient was taken off the fracture table.  We   noted approximately equal leg lengths and symmetrical rotation.  She was then   extubated and taken to recovery room in stable condition.    POSTOPERATIVE PLAN:  1.  Intravenous antibiotics for 24 hours.  2.  DVT prophylaxis with SCDs and Lovenox.  Transition to aspirin 81 mg p.o.   b.i.d. for 6 weeks at time of discharge.  3.  Weightbearing as tolerated.  4.  Ongoing perioperative medical management per hospitalist.       ____________________________________     MD KAYA MORILLO / SERG    DD:  05/30/2017 12:56:12  DT:  05/30/2017 16:04:32    D#:  [id number]  Job#:  [id number]

## 2017-05-30 NOTE — H&P
DATE OF ADMISSION:  05/29/2017    CHIEF COMPLAINT:  Fell down in the backyard and hurt her left leg.    HISTORY OF PRESENT ILLNESS:  The patient is an 87-year-old female with really   no significant medical problems.  She was watering _____ in her backyard and   then slipped on some stairs.  She never had loss of consciousness.  She fell   down on her left side, did not hit her head.  She had immediate pain over the   left hip radiating down to the knee.  She knew there was something broken.    She tried to get up and there was such severe pain she could not get up.  They   called 911, they brought her here.  She was found to have a left   intertrochanteric femur fracture.  Orthopedic surgery has been consulted, they   will perform surgery in the morning.  She denies any numbness or weakness of   the lower extremity.  She denies any head trauma.  No nausea or vomiting.  No   fevers or chills.  No new prescription medications or over-the-counter   medications.  She did not take any pain medications at home.  Her pain at home   was 10/10 and now it is about 4/10.    REVIEW OF SYSTEMS:  All other systems reviewed are negative.    In the ED, orthopedic surgery was consulted.    PAST MEDICAL HISTORY:  None.    PAST SURGICAL HISTORY:  None.    SOCIAL HISTORY:  Social alcohol, no drugs, no tobacco.    ALLERGIES:  None.    MEDICATIONS PRIOR TO ADMISSION:  None.    PHYSICAL EXAMINATION:  VITAL SIGNS:  Temperature is 37.1, heart rate 72, respiratory rate 16, oxygen   saturation 98% on room air, blood pressure 132/63.  GENERAL:  Patient is in no acute distress, speaking in full sentences, A and O   x4, pleasant and cooperative.  HEENT:  Normocephalic, atraumatic.  Pupils reactive to light.  Extraocular   muscles intact.  NECK:  Flat JVD.  CARDIOVASCULAR:  Regular rate and rhythm.  No murmurs or gallops.    Nondisplaced PMI.  LUNGS:  Clear to auscultation bilaterally.  No use of accessory muscles.  ABDOMEN:  Soft, nontender,  nondistended.  Normoactive bowel sounds.  No   hepatosplenomegaly.  EXTREMITIES:  No clubbing, cyanosis, or edema.  It is warm peripherally, 2+   radial pulses equal and symmetric.  She has tenderness to palpation along the   left lateral edge of the left lower extremity.  There is no obvious deformity.    There is no ecchymosis.  She does have 2+ DP pulses equal and symmetric.    Good capillary refill through all toes.  NEUROLOGIC:  Intact sensation to soft touch.  Nonfocal.  MUSCULOSKELETAL:  Full range of motion of all extremities except for limited   range of motion and hip flexion/extension due to pain.  Left leg is in an   abducted position.  SKIN:  No rashes, lesions, or excoriations.  PSYCHOLOGICAL:  Appropriate affect, A and O x4.    LABORATORY DATA:  CBC remarkable for white blood cell count of 7.9, H and H   13.8 and 41.7, platelet count is 209.  CMP:  Sodium 140, potassium 3.5, BUN   and creatinine 22 and 0.76.  AST and ALT 24 and 25.  Troponin less than 0.01.    BNP 96.  PT/INR 12.5 and 0.91, PTT 23.5.    IMAGIN.  X-ray of the femur, 2-view:  Comminuted displaced acute intertrochanteric   fracture of the left proximal femur.  2.  Portable chest x-ray:   Cardiomegaly, no evidence of focal consolidation.  3.  X-ray of the pelvis:  No bony fractures of the pelvis, intertrochanteric   fracture as outlined above.  4.  EKG personally reviewed by me, sinus ginny, right bundle-branch block, no   old EKG to compare to.  No evidence of acute ST segment changes.    ASSESSMENT:  1.  Acute left comminuted displaced intertrochanteric femur fracture.  2.  Ground-level fall.  3.  Right bundle-branch block.    PLAN:  Patient will be admitted to the orthopedic unit.  Orthopedic surgery   has been consulted.  The patient will undergo intervention in the morning.    N.p.o. after midnight.  Pain control with IV and p.o. pain meds.  Patient is   remarkably healthy for 87-year-old and really has no other significant past    medical history.  I see no barrier for her getting surgery tomorrow morning.    CODE STATUS:  Full code _____.    DIET:  N.p.o. after midnight.    DVT prophylaxis, bilateral SCDs.       ____________________________________     MD MIRZA QIU / SERG    DD:  05/29/2017 20:42:22  DT:  05/29/2017 22:36:29    D#:  [id number]  Job#:  [id number]

## 2017-05-30 NOTE — PROGRESS NOTES
Assessment complete  C/o pain 6/10. Refusing narcotics due to nausea in past. Po zofran given with tylenol po. Pt very worried about nausea with meds and surgery tomorrow  Sleeping in between care.  Tolerating regular diet. Will be NPO at midnight for surgery tomorrow  Francisco in place and draining to gravity  Positioned for comfort with pillows  2+ pedal pulses bilaterally. Denies n,t  PIV patent with IVF infusing  Call light within reach. All needs met at this time. Will continue hourly rounding.

## 2017-05-30 NOTE — CONSULTS
Orthopaedic Consult / H&P Note  Date: 5/29/17  Time: 1900    ATTENDING: OLIVE Liu MD    CHIEF COMPLAINT: L IT femur fracture    HISTORY OF PRESENT ILLNESS: Char Rockwell is a 87 y.o. who presents after a FFS with a left IT femur fracture.  Unable to bear weight LLE since fall.  Denies any broken bones before.  Denies pain elsewhere.  Reliable historian, was previously independent community ambulator prior to fall.    Past Medical History   Diagnosis Date   • Allergy      environmental       Past Surgical History   Procedure Laterality Date   • Mammoplasty augmentation  age 30's       No current facility-administered medications on file prior to encounter.     No current outpatient prescriptions on file prior to encounter.       Allergies: Review of patient's allergies indicates no known allergies.    Social History     Social History   • Marital Status:      Spouse Name: N/A   • Number of Children: N/A   • Years of Education: N/A     Occupational History   • Not on file.     Social History Main Topics   • Smoking status: Never Smoker    • Smokeless tobacco: Never Used   • Alcohol Use: No   • Drug Use: No   • Sexual Activity: Not Currently     Other Topics Concern   • Not on file     Social History Narrative       Family History   Problem Relation Age of Onset   • Family history unknown: Yes       REVIEW OF SYSTEMS: Full 13-point review of systems obtained with positives   noted in the HPI above, all others negative.    PHYSICAL EXAMINATION:  Filed Vitals:    05/29/17 1800 05/29/17 1828 05/29/17 1830 05/29/17 1900   BP: 140/63      Pulse: 68  78 72   Temp:       Resp: 17 16 22 17   Height:       Weight:       SpO2: 98%  99% 96%       GENERAL: No acute distress, alert and oriented x3.  HEENT: Normocephalic, atraumatic  CARDIOVASCULAR: Regular rate  RESPIRATORY: Unlabored  ABDOMEN: Soft, nontender, nondistended.  EXTREMITIES: LLE:  Pain with any attempts at ROM.  +TTP left hip.  Intact DF/PF ankle and  toes. SILT feet. Brisk CR, 2+dp pulse  RLE: can straight leg raise, no pain with hip ROM      LABORATORY DATA:     Lab Results   Component Value Date/Time    WBC 7.9 05/29/2017 02:20 PM    RBC 4.61 05/29/2017 02:20 PM    HEMOGLOBIN 13.8 05/29/2017 02:20 PM    HEMATOCRIT 41.7 05/29/2017 02:20 PM        Lab Results   Component Value Date/Time    SODIUM 140 05/29/2017 02:20 PM    POTASSIUM 3.5* 05/29/2017 02:20 PM    CHLORIDE 105 05/29/2017 02:20 PM    CO2 25 05/29/2017 02:20 PM    GLUCOSE 111* 05/29/2017 02:20 PM    BUN 22 05/29/2017 02:20 PM    CREATININE 0.76 05/29/2017 02:20 PM        Lab Results   Component Value Date/Time    PT 12.5 05/29/2017 02:20 PM    INR 0.91 05/29/2017 02:20 PM          IMAGING: pelvis and left femur:  Left intertrochanteric femur fracture, displaced, initial encounter.        ASSESSMENT AND PLAN: left IT femur fracture in 87 F previously independent ambulator    Discussed risks/benefits/alternative treatments in detail to surgical fixation of the left hip fracture, and pt decided to proceed operatively.    Hospitalists admitting for preoperative clearance and medical management.  Hg 13.8 and INR 0.91, not on blood thinners.  Npo after midnight for surgery tomorrow     Weight Bearing Status-NWB LLE for now  DVT Prophylaxis- TEDS/SCDs, hold chemoprophylaxis after midnight for surgery tomorrow AM        Ras Sen MD  Orthopaedic Surgeon  Arthroplasty Fellow, [facility]      Please page or call with any questions or concerns regarding this patient's care

## 2017-05-30 NOTE — CARE PLAN
Problem: Safety  Goal: Will remain free from injury  Outcome: PROGRESSING AS EXPECTED  Intervention: Provide assistance with mobility    05/29/17 1900   OTHER   Assistance / Tolerance Assistance of Two or More       Intervention: Collaborate with Interdisciplinary Team for safe transfer and mobilization techniques    05/29/17 1900   OTHER   Assistive Devices Slide board;Rails         Goal: Will remain free from falls  Outcome: PROGRESSING AS EXPECTED  Intervention: Assess risk factors for falls    05/29/17 1900   OTHER   Fall Risk High Risk to Fall - 2 or more points    Risk for Injury-Any positive answers results in the pt being at high risk for fall related injury Age: Over 85 Years   Mobility Status Assessment 2-2 Healthcare Providers Required for Assistance with Ambulation & Transfer   History of fall 2   Date of Last Fall 05/29/17   Pt Calls for Assistance Yes       Intervention: Implement fall precautions    05/29/17 1900   OTHER   Environmental Precautions Treaded Slipper Socks on Patient;Personal Belongings, Wastebasket, Call Bell etc. in Easy Reach;Transferred to Stronger Side;Report Given to Other Health Care Providers Regarding Fall Risk;Bed in Low Position;Communication Sign for Patients & Families;Mobility Assessed & Appropriate Sign Placed   IV Pole on Same Side of Bed as Bathroom Yes   Bedrails Bedrails Closest to Bathroom Down   Chair/Bed Strip Alarm Yes - Alarm On

## 2017-05-30 NOTE — PROGRESS NOTES
Pt arrived to [facility] via [facility].  Slide board used to transfer to bed  2 RN skin assessment completed with bryson ZAMBRANO  Skin intact expect redness to left elbow that blanches, bruise to left buttocks, and small wound to right ear from recent biopsy.  Positioned for comfort with pillows in place  Oriented to room  Call light within reach

## 2017-05-30 NOTE — OR SURGEON
Immediate Post-Operative Note      PreOp Diagnosis: Left intertrochanteric hip fx    PostOp Diagnosis: Same    Procedure(s):  HIP NAILING INTRAMEDULLARY - Wound Class: Clean    Surgeon(s):  NERY Jaquez M.D.    Anesthesiologist/Type of Anesthesia:  Anesthesiologist: Paul Richmond D.O./General    Surgical Staff:  Circulator: ALVIN Thakur Circulator: ALVIN Robledo Scrub: Salvador Tapia R.N.  Scrub Person: Audie Ritter    Specimen: None    Estimated Blood Loss: 25 cc    Findings: Fracture    Complications: None        5/30/2017 11:37 AM Zion Pimentel

## 2017-05-31 LAB
ANION GAP SERPL CALC-SCNC: 7 MMOL/L (ref 0–11.9)
BUN SERPL-MCNC: 21 MG/DL (ref 8–22)
CALCIUM SERPL-MCNC: 7.6 MG/DL (ref 8.5–10.5)
CHLORIDE SERPL-SCNC: 107 MMOL/L (ref 96–112)
CO2 SERPL-SCNC: 22 MMOL/L (ref 20–33)
CREAT SERPL-MCNC: 0.8 MG/DL (ref 0.5–1.4)
EKG IMPRESSION: NORMAL
ERYTHROCYTE [DISTWIDTH] IN BLOOD BY AUTOMATED COUNT: 44.3 FL (ref 35.9–50)
GFR SERPL CREATININE-BSD FRML MDRD: >60 ML/MIN/1.73 M 2
GLUCOSE SERPL-MCNC: 224 MG/DL (ref 65–99)
HCT VFR BLD AUTO: 24.3 % (ref 37–47)
HCT VFR BLD AUTO: 25.1 % (ref 37–47)
HGB BLD-MCNC: 7.6 G/DL (ref 12–16)
HGB BLD-MCNC: 8.2 G/DL (ref 12–16)
LV EJECT FRACT  99904: 65
LV EJECT FRACT MOD 2C 99903: 68.4
LV EJECT FRACT MOD 4C 99902: 70.33
LV EJECT FRACT MOD BP 99901: 69.19
MCH RBC QN AUTO: 30.3 PG (ref 27–33)
MCHC RBC AUTO-ENTMCNC: 32.7 G/DL (ref 33.6–35)
MCV RBC AUTO: 92.6 FL (ref 81.4–97.8)
PLATELET # BLD AUTO: 142 K/UL (ref 164–446)
PMV BLD AUTO: 10.4 FL (ref 9–12.9)
POTASSIUM SERPL-SCNC: 4.3 MMOL/L (ref 3.6–5.5)
RBC # BLD AUTO: 2.71 M/UL (ref 4.2–5.4)
SODIUM SERPL-SCNC: 136 MMOL/L (ref 135–145)
WBC # BLD AUTO: 9 K/UL (ref 4.8–10.8)

## 2017-05-31 PROCEDURE — G8987 SELF CARE CURRENT STATUS: HCPCS | Mod: CK

## 2017-05-31 PROCEDURE — 700111 HCHG RX REV CODE 636 W/ 250 OVERRIDE (IP): Performed by: INTERNAL MEDICINE

## 2017-05-31 PROCEDURE — 85018 HEMOGLOBIN: CPT

## 2017-05-31 PROCEDURE — 93306 TTE W/DOPPLER COMPLETE: CPT | Mod: 26 | Performed by: INTERNAL MEDICINE

## 2017-05-31 PROCEDURE — 700102 HCHG RX REV CODE 250 W/ 637 OVERRIDE(OP): Performed by: INTERNAL MEDICINE

## 2017-05-31 PROCEDURE — 770006 HCHG ROOM/CARE - MED/SURG/GYN SEMI*

## 2017-05-31 PROCEDURE — G8988 SELF CARE GOAL STATUS: HCPCS | Mod: CI

## 2017-05-31 PROCEDURE — 700105 HCHG RX REV CODE 258: Performed by: INTERNAL MEDICINE

## 2017-05-31 PROCEDURE — A9270 NON-COVERED ITEM OR SERVICE: HCPCS | Performed by: INTERNAL MEDICINE

## 2017-05-31 PROCEDURE — A9270 NON-COVERED ITEM OR SERVICE: HCPCS | Performed by: ORTHOPAEDIC SURGERY

## 2017-05-31 PROCEDURE — 51798 US URINE CAPACITY MEASURE: CPT

## 2017-05-31 PROCEDURE — 97162 PT EVAL MOD COMPLEX 30 MIN: CPT

## 2017-05-31 PROCEDURE — G8979 MOBILITY GOAL STATUS: HCPCS | Mod: CI

## 2017-05-31 PROCEDURE — 36415 COLL VENOUS BLD VENIPUNCTURE: CPT

## 2017-05-31 PROCEDURE — G8978 MOBILITY CURRENT STATUS: HCPCS | Mod: CK

## 2017-05-31 PROCEDURE — 700111 HCHG RX REV CODE 636 W/ 250 OVERRIDE (IP): Performed by: ORTHOPAEDIC SURGERY

## 2017-05-31 PROCEDURE — 80048 BASIC METABOLIC PNL TOTAL CA: CPT

## 2017-05-31 PROCEDURE — 85027 COMPLETE CBC AUTOMATED: CPT

## 2017-05-31 PROCEDURE — 97165 OT EVAL LOW COMPLEX 30 MIN: CPT

## 2017-05-31 PROCEDURE — 93306 TTE W/DOPPLER COMPLETE: CPT

## 2017-05-31 PROCEDURE — 99233 SBSQ HOSP IP/OBS HIGH 50: CPT | Performed by: INTERNAL MEDICINE

## 2017-05-31 PROCEDURE — 700102 HCHG RX REV CODE 250 W/ 637 OVERRIDE(OP): Performed by: ORTHOPAEDIC SURGERY

## 2017-05-31 PROCEDURE — 85014 HEMATOCRIT: CPT

## 2017-05-31 RX ADMIN — SODIUM CHLORIDE: 9 INJECTION, SOLUTION INTRAVENOUS at 10:39

## 2017-05-31 RX ADMIN — ACETAMINOPHEN 650 MG: 325 TABLET, FILM COATED ORAL at 23:55

## 2017-05-31 RX ADMIN — ACETAMINOPHEN 650 MG: 325 TABLET, FILM COATED ORAL at 17:16

## 2017-05-31 RX ADMIN — OXYCODONE HYDROCHLORIDE 5 MG: 5 TABLET ORAL at 05:44

## 2017-05-31 RX ADMIN — SODIUM CHLORIDE: 9 INJECTION, SOLUTION INTRAVENOUS at 17:17

## 2017-05-31 RX ADMIN — STANDARDIZED SENNA CONCENTRATE AND DOCUSATE SODIUM 2 TABLET: 8.6; 5 TABLET, FILM COATED ORAL at 08:27

## 2017-05-31 RX ADMIN — ACETAMINOPHEN 650 MG: 325 TABLET, FILM COATED ORAL at 05:44

## 2017-05-31 RX ADMIN — CEFAZOLIN SODIUM 1 G: 1 INJECTION, SOLUTION INTRAVENOUS at 05:44

## 2017-05-31 RX ADMIN — ACETAMINOPHEN 650 MG: 325 TABLET, FILM COATED ORAL at 13:09

## 2017-05-31 RX ADMIN — CEFAZOLIN SODIUM 1 G: 1 INJECTION, SOLUTION INTRAVENOUS at 13:07

## 2017-05-31 RX ADMIN — ONDANSETRON 4 MG: 2 INJECTION INTRAMUSCULAR; INTRAVENOUS at 05:44

## 2017-05-31 RX ADMIN — ENOXAPARIN SODIUM 40 MG: 100 INJECTION SUBCUTANEOUS at 08:28

## 2017-05-31 RX ADMIN — OXYCODONE HYDROCHLORIDE 5 MG: 5 TABLET ORAL at 23:58

## 2017-05-31 RX ADMIN — STANDARDIZED SENNA CONCENTRATE AND DOCUSATE SODIUM 2 TABLET: 8.6; 5 TABLET, FILM COATED ORAL at 20:41

## 2017-05-31 RX ADMIN — OXYCODONE HYDROCHLORIDE 5 MG: 5 TABLET ORAL at 18:10

## 2017-05-31 RX ADMIN — OXYCODONE HYDROCHLORIDE 5 MG: 5 TABLET ORAL at 10:39

## 2017-05-31 ASSESSMENT — ENCOUNTER SYMPTOMS
MYALGIAS: 1
DEPRESSION: 0
WEAKNESS: 1
FEVER: 0
BACK PAIN: 0
SHORTNESS OF BREATH: 0
ABDOMINAL PAIN: 0
NERVOUS/ANXIOUS: 0
NAUSEA: 0
FOCAL WEAKNESS: 0
COUGH: 0
CHILLS: 0
FLANK PAIN: 0
SPUTUM PRODUCTION: 0
TREMORS: 0
PALPITATIONS: 0

## 2017-05-31 ASSESSMENT — GAIT ASSESSMENTS
ASSISTIVE DEVICE: FRONT WHEEL WALKER
DEVIATION: STEP TO;DECREASED BASE OF SUPPORT;ANTALGIC
DISTANCE (FEET): 10
GAIT LEVEL OF ASSIST: CONTACT GUARD ASSIST

## 2017-05-31 ASSESSMENT — COGNITIVE AND FUNCTIONAL STATUS - GENERAL
DAILY ACTIVITIY SCORE: 19
MOVING TO AND FROM BED TO CHAIR: A LOT
CLIMB 3 TO 5 STEPS WITH RAILING: A LOT
WALKING IN HOSPITAL ROOM: A LITTLE
TURNING FROM BACK TO SIDE WHILE IN FLAT BAD: A LOT
MOVING FROM LYING ON BACK TO SITTING ON SIDE OF FLAT BED: A LITTLE
HELP NEEDED FOR BATHING: A LOT
TOILETING: A LITTLE
MOBILITY SCORE: 15
SUGGESTED CMS G CODE MODIFIER MOBILITY: CK
DRESSING REGULAR LOWER BODY CLOTHING: A LOT
SUGGESTED CMS G CODE MODIFIER DAILY ACTIVITY: CK
STANDING UP FROM CHAIR USING ARMS: A LITTLE

## 2017-05-31 ASSESSMENT — PAIN SCALES - GENERAL
PAINLEVEL_OUTOF10: 2
PAINLEVEL_OUTOF10: 2
PAINLEVEL_OUTOF10: 3
PAINLEVEL_OUTOF10: 2
PAINLEVEL_OUTOF10: 2
PAINLEVEL_OUTOF10: 4
PAINLEVEL_OUTOF10: 3
PAINLEVEL_OUTOF10: 4

## 2017-05-31 ASSESSMENT — ACTIVITIES OF DAILY LIVING (ADL): TOILETING: INDEPENDENT

## 2017-05-31 NOTE — THERAPY
"Occupational Therapy Evaluation completed.   Functional Status: CGA stand-pivot with FWW, Mod A EOB > supine, CGA standing grooming   Plan of Care: Will benefit from Occupational Therapy 3 times per week and Patient with no further skilled OT needs in the acute care setting at this time  Discharge Recommendations:  Equipment: Will Continue to Assess for Equipment Needs. Post-acute therapy Discharge to a transitional care facility for continued skilled therapy services.    See \"Rehab Therapy-Acute\" Patient Summary Report for complete documentation.    87 y.o. female s/p GLF with resultant L IT fx, now s/p IM nail. Pt seen for OT eval. Limited by impaired AROM/motor control of LLE, decreased standing balance, pain. Educated daughter and spouse on recommendation for shower chair on DC. Family agrees to purchase. Pt would benefit from acute OT and post-acute transitional care setting to maximize functional independence and safety.     "

## 2017-05-31 NOTE — PROGRESS NOTES
Pt ambulated to chair with 2 assist with FWW. Pt up to chair became extremely lethargic and c/o of dizziness and diaphoretic. BP 67/41. Pt safely transfer to bed. Rapid response called. MD called. IVF started.    1815 Pt completed her 1 L bolus. BP checked after 124/47. Pt alert and oriented to situation.

## 2017-05-31 NOTE — ASSESSMENT & PLAN NOTE
S/p L hip nailing, 5/30  Pain control  Pt/ot  - accepted at [facility]  - monitor h/h, anemia

## 2017-05-31 NOTE — PROGRESS NOTES
Francisco removed by student nurse.  Executed properly.  No complications.  Will assess for urine output.

## 2017-05-31 NOTE — RESPIRATORY CARE
Respiratory Rapid Response Note    Symptoms Dizziness, passed out     Breath Sounds  Pre/Post Intervention: Pre Intervention Assessment (05/29/17 1828)  RUL Breath Sounds: Clear (05/30/17 0800)  RML Breath Sounds: Clear (05/30/17 0800)  RLL Breath Sounds: Clear (05/30/17 0800)  BRAYDEN Breath Sounds: Clear (05/30/17 0800)  LLL Breath Sounds: Clear (05/30/17 0800)                O2 (FiO2): 32 (05/29/17 1828)  O2 (LPM): 3 (05/30/17 1300)  O2 Daily Delivery Respiratory : Oxygen at 3lpm

## 2017-05-31 NOTE — DISCHARGE PLANNING
Care Transition Team Assessment      Met with pt, spouse and dtr Lorraine Brown #[phone] (main contact) and discussed d/c planning. Family in agreement with SNF placement following hospital stay. Family and pt choice is  Healthcare, faxed choice form to [facility] to process. Per dtr pt does not have hx of MH/psych dx or dementia. Reviewed H/P and no indication of MH/Psych dx, completed PASRR ID#[id number].     Discussed pt's case in MDT rounds, pt may be medically cleared in the next day or two for SNF.     Family touring [facility] today and confirmed from [facility] that they can accept pt when she is medically cleared.     Needs: Awaiting medical clearance.   COBRA, chart copy, IMM and d/c summary to be completed when pt is medically cleared.       Information Source  Orientation : Oriented x 4  Information Given By: Relative  Informant's Name: daughter Lorraine Brown  Who is responsible for making decisions for patient? : Patient    Readmission Evaluation  Is this a readmission?: No    Elopement Risk  Legal Hold: No  Ambulatory or Self Mobile in Wheelchair: No-Not an Elopement Risk  Elopement Risk: Not at Risk for Elopement    Interdisciplinary Discharge Planning  Does Admitting Nurse Feel This Could be a Complex Discharge?: No  Primary Care Physician: Magdalena DORAN  Lives with - Patient's Self Care Capacity: Spouse  Patient or legal guardian wants to designate a caregiver (see row info): No  Support Systems: Spouse / Significant Other  Housing / Facility: [address] [facility]  Do You Take your Prescribed Medications Regularly: No  Reasons Why Not Taking Medications :  (no meds)  Able to Return to Previous ADL's: No  Mobility Issues: Yes  Prior Services: None, Home-Independent  Patient Expects to be Discharged to:: home  Assistance Needed: Yes  Durable Medical Equipment: Not Applicable (Pt does not use DME at home but will need FWW when d/c home )    Discharge Preparedness  What is your plan after discharge?: Skilled  nursing facility  What are your discharge supports?: Child, Spouse  Prior Functional Level: Ambulatory, Drives Self, Independent with Activities of Daily Living, Independent with Medication Management  Difficulity with ADLs: None  Difficulity with IADLs: None    Functional Assesment  Prior Functional Level: Ambulatory, Drives Self, Independent with Activities of Daily Living, Independent with Medication Management    Finances  Financial Barriers to Discharge: No  Prescription Coverage: Yes    Vision / Hearing Impairment  Vision Impairment : Yes  Right Eye Vision: Impaired, Wears Glasses  Left Eye Vision: Impaired, Wears Glasses  Hearing Impairment : No    Values / Beliefs / Concerns  Values / Beliefs Concerns : No    Advance Directive  Advance Directive?: None    Domestic Abuse  Have you ever been the victim of abuse or violence?: No  Physical Abuse or Sexual Abuse: No  Verbal Abuse or Emotional Abuse: No  Possible Abuse Reported to:: Not Applicable    Psychological Assessment  History of Substance Abuse: None  History of Psychiatric Problems: No  Non-compliant with Treatment: No  Newly Diagnosed Illness: No    Discharge Risks or Barriers  Discharge risks or barriers?: No    Anticipated Discharge Information  Anticipated discharge disposition: SNF  Discharge Address: [facility] ([facility]  Discharge Contact Phone Number: MARTIN Peters #[phone]

## 2017-05-31 NOTE — PROGRESS NOTES
Hospital Medicine Interval Note  Date of Service:  5/30/2017    Chief Complaint  87 y.o.-year-old female admitted 5/29/2017 with fall and L hip fracture, s/p nailing 5/30    Interval Problem Update  Hypotension with drop in h/h post procedure  Pain controlled,   Fluid responsive      Consultants/Specialty  Ortho/juliann    Disposition  Pending/complex  Pt/ot eval       Review of Systems   Constitutional: Negative for fever, chills, malaise/fatigue and diaphoresis.   HENT: Negative for congestion and hearing loss.    Eyes: Negative for blurred vision and double vision.   Respiratory: Negative for cough, sputum production and shortness of breath.    Cardiovascular: Negative for chest pain, palpitations and leg swelling.   Gastrointestinal: Negative for nausea and abdominal pain.   Genitourinary: Negative for dysuria and flank pain.   Musculoskeletal: Positive for myalgias and joint pain. Negative for back pain.   Neurological: Positive for weakness. Negative for dizziness, tremors, focal weakness and headaches.   Psychiatric/Behavioral: The patient is not nervous/anxious.       Physical Exam Laboratory/Imaging   Filed Vitals:    05/30/17 1600 05/30/17 1735 05/30/17 1741 05/30/17 1749   BP: 121/68 95/38 97/40 107/40   Pulse: 59 116 66 63   Temp: 36.4 °C (97.6 °F)      Resp: 16  18 16   Height:       Weight:       SpO2: 97% 100% 100% 100%     Physical Exam   Constitutional: She appears well-nourished.   HENT:   Head: Normocephalic and atraumatic.   Nose: Nose normal.   Eyes: EOM are normal. Pupils are equal, round, and reactive to light. Right eye exhibits no discharge. Left eye exhibits no discharge.   Neck: Normal range of motion. Neck supple. No JVD present. No thyromegaly present.   Cardiovascular: Normal rate and intact distal pulses.    No murmur heard.  Pulmonary/Chest: Effort normal and breath sounds normal. No respiratory distress. She has no wheezes.   Abdominal: Soft. Bowel sounds are normal. She exhibits no  distension. There is no tenderness.   Musculoskeletal: She exhibits edema and tenderness.   Neurological: She is alert. No cranial nerve deficit. She exhibits normal muscle tone.   Skin: Skin is warm and dry. No rash noted. She is not diaphoretic. No erythema.   Psychiatric: Thought content normal.   Nursing note and vitals reviewed.   Lab Results   Component Value Date/Time    WBC 10.8 05/30/2017 06:00 PM    HEMOGLOBIN 8.4* 05/30/2017 06:00 PM    HEMATOCRIT 25.9* 05/30/2017 06:00 PM    PLATELET COUNT 157* 05/30/2017 06:00 PM     Lab Results   Component Value Date/Time    SODIUM 135 05/30/2017 05:59 PM    POTASSIUM 4.0 05/30/2017 05:59 PM    CHLORIDE 105 05/30/2017 05:59 PM    CO2 22 05/30/2017 05:59 PM    GLUCOSE 209* 05/30/2017 05:59 PM    BUN 20 05/30/2017 05:59 PM    CREATININE 0.95 05/30/2017 05:59 PM      Assessment/Plan    * Intertrochanteric fracture (CMS-[facility])  Assessment & Plan  S/p L hip nailing, 5/30  Pain control  Pt/ot      Fall  Assessment & Plan  Pt/ot  mechanical    Hyperglycemia  Assessment & Plan  F/u a1c  monitor    Hypotension  Assessment & Plan  S/p surgery, with dizziness, and ams  H/h drop, 25ml blood loss, ? Dilutional  Fluid responsive  Cont ivf  H/H q8  If ongoing drop h/h, dc lovenox  Monitor closely  ? Orthostatic hypotension, RN to assess    Anemia  Assessment & Plan  monitor       Labs reviewed, Medications reviewed and Radiology images reviewed        DVT Prophylaxis: Enoxaparin (Lovenox)    Ulcer prophylaxis: Not indicated    Assessed for rehab: Patient was assess for and/or received rehabilitation services during this hospitalization

## 2017-05-31 NOTE — CODE DOCUMENTATION
Responded to rapid response.  Pt became very light headed and hypotensive after getting up for the first time post hip surgery today.  Pt was placed back in bed, BP was 67/41. 500cc bolus was initiated, pt responded well to fluid and being back in bed.  BP increased to SBP 's.  EKG and labs ordered.

## 2017-05-31 NOTE — PROGRESS NOTES
Hospital Medicine Interval Note  Date of Service:  5/31/2017    Chief Complaint  87 y.o.-year-old female admitted 5/29/2017 with fall and L hip fracture, s/p nailing 5/30    Interval Problem Update  bp stable  Working with PT  Pain controlled  Denies dizziness      Consultants/Specialty  Ortho/juliann    Disposition  snf in 1-2 days with clinical improvement  Pt/ot eval       Review of Systems   Constitutional: Negative for fever, chills and malaise/fatigue.   HENT: Negative for congestion and hearing loss.    Respiratory: Negative for cough, sputum production and shortness of breath.    Cardiovascular: Negative for palpitations and leg swelling.   Gastrointestinal: Negative for nausea and abdominal pain.   Genitourinary: Negative for flank pain.   Musculoskeletal: Positive for myalgias and joint pain. Negative for back pain.   Neurological: Positive for weakness. Negative for tremors and focal weakness.   Psychiatric/Behavioral: Negative for depression. The patient is not nervous/anxious.       Physical Exam Laboratory/Imaging   Filed Vitals:    05/30/17 2335 05/31/17 0409 05/31/17 0800 05/31/17 1157   BP: 100/41 106/47 107/46 124/37   Pulse: 65 75 64 73   Temp: 36.6 °C (97.9 °F) 36.7 °C (98 °F) 36.6 °C (97.8 °F) 36.7 °C (98 °F)   Resp: 16 17 16 14   Height:       Weight:       SpO2: 98% 97% 98%      Physical Exam   Constitutional: She is oriented to person, place, and time. She appears well-nourished. No distress.   HENT:   Head: Normocephalic and atraumatic.   Nose: Nose normal.   Eyes: EOM are normal. Pupils are equal, round, and reactive to light. No scleral icterus.   Neck: Normal range of motion. No thyromegaly present.   Cardiovascular: Normal rate and intact distal pulses.    Murmur heard.  Pulmonary/Chest: Effort normal and breath sounds normal. No respiratory distress.   Abdominal: Soft. Bowel sounds are normal. She exhibits no distension. There is no tenderness.   Musculoskeletal: She exhibits edema and  tenderness.   Neurological: She is alert and oriented to person, place, and time. No cranial nerve deficit. Coordination normal.   Skin: Skin is warm and dry. She is not diaphoretic.   Psychiatric: Judgment and thought content normal.   Nursing note and vitals reviewed.   Lab Results   Component Value Date/Time    WBC 9.0 05/31/2017 02:45 AM    HEMOGLOBIN 8.2* 05/31/2017 02:45 AM    HEMATOCRIT 24.3* 05/31/2017 02:31 PM    PLATELET COUNT 142* 05/31/2017 02:45 AM     Lab Results   Component Value Date/Time    SODIUM 136 05/31/2017 02:45 AM    POTASSIUM 4.3 05/31/2017 02:45 AM    CHLORIDE 107 05/31/2017 02:45 AM    CO2 22 05/31/2017 02:45 AM    GLUCOSE 224* 05/31/2017 02:45 AM    BUN 21 05/31/2017 02:45 AM    CREATININE 0.80 05/31/2017 02:45 AM      Assessment/Plan    * Intertrochanteric fracture (CMS-[facility])  Assessment & Plan  S/p L hip nailing, 5/30  Pain control  Pt/ot  - snf referral      Fall  Assessment & Plan  Pt/ot  mechanical    Hyperglycemia  Assessment & Plan  -a1c 5.7  monitor    Hypotension  Assessment & Plan  Resolved  Cont ivf  Orthostatic vital  Echo    H/o MVR    Anemia  Assessment & Plan  Monitor  - h/h stable, change to q12       Labs reviewed, Medications reviewed and Radiology images reviewed        DVT Prophylaxis: Enoxaparin (Lovenox)    Ulcer prophylaxis: Not indicated    Assessed for rehab: Patient was assess for and/or received rehabilitation services during this hospitalization

## 2017-05-31 NOTE — PROGRESS NOTES
Pt is AAOx4.  Denies any pain or discomfort at this time.  L hip dressing in place, CDI.  2L O2 running via NC, pt usually does not wear O2.  Pt up in chair for bfast.  PT worked with pt this AM.  POC discussed.  All needs met at this time.  Bed in low position.   Call light within reach.  Rounding in place.

## 2017-05-31 NOTE — THERAPY
"Physical Therapy Evaluation completed.   Bed Mobility:  Supine to Sit: Moderate Assist (for L LE and trunk)  Transfers: Sit to Stand: Contact Guard Assist  Gait: Level Of Assist: Contact Guard Assist with Front-Wheel Walker       Plan of Care: Will benefit from Physical Therapy 4 times per week  Discharge Recommendations: Equipment: Front-Wheel Walker. Post-acute therapy Discharge to a transitional care facility for continued skilled therapy services as it does not appear that spouse can assist pt if she requires and she is well below baseline function.     See \"Rehab Therapy-Acute\" Patient Summary Report for complete documentation.     "

## 2017-05-31 NOTE — CARE PLAN
Problem: Knowledge Deficit  Goal: Knowledge of disease process/condition, treatment plan, diagnostic tests, and medications will improve  POC discussed in length with both pt and . All questions and concerns addressed.     Problem: Urinary Elimination:  Goal: Ability to reestablish a normal urinary elimination pattern will improve  Will remove daily and monitor for urine output. Will straight cath PRN.

## 2017-05-31 NOTE — PROGRESS NOTES
DATE OF SERVICE:  05/31/2017    DATE OF SERVICE:  05/31/2017    TIME:  Approximately 12:15 p.m.    HISTORY OF PRESENT ILLNESS:  The patient is sitting in her chair.  She feels   fine.  She had left hip nailing yesterday.  Blood pressure 124/37, heart rate   73, respirations 14, temperature 98.8.  Her SCDs are in place.  Dressings are   dry.    ASSESSMENT:  Left intertrochanteric hip fracture -- status post intramedullary   nailing.    PLAN:  Weightbearing as tolerated.  Physical and occupational therapy.    Dressing change tomorrow.  Discharge planning.  She can go to SNF tomorrow if   available.  Deep venous thrombosis prophylaxis with SCDs and Lovenox.  She had   a dose this morning.  After discharge from inpatient stay, she should go to   aspirin twice a day for DVT prophylaxis for approximately 6 weeks.       ____________________________________     MD KAYA MORILLO / SERG    DD:  05/31/2017 13:42:12  DT:  05/31/2017 13:54:19    D#:  [id number]  Job#:  [id number]

## 2017-05-31 NOTE — ASSESSMENT & PLAN NOTE
Monitor- increasing  - transfuse 1 u prbc, f/u cbc now    - h/h q12  Avoid nsaids  - dc lovenox  - scd

## 2017-05-31 NOTE — CARE PLAN
Problem: Communication  Goal: The ability to communicate needs accurately and effectively will improve  Outcome: PROGRESSING AS EXPECTED  POC discussed. Call light and personal belongings within reach. Rounds in place.    Problem: Mobility  Goal: Risk for activity intolerance will decrease  Outcome: PROGRESSING AS EXPECTED    Problem: Respiratory:  Goal: Respiratory status will improve  Outcome: PROGRESSING AS EXPECTED  Pt on 2LPM O2 via NC. IS demonstrated correct use. Encouraged to do 10 breaths per hour with IS.

## 2017-06-01 LAB
ABO GROUP BLD: NORMAL
ABO GROUP BLD: NORMAL
BARCODED ABORH UBTYP: 5100
BARCODED PRD CODE UBPRD: NORMAL
BARCODED UNIT NUM UBUNT: NORMAL
BASOPHILS # BLD AUTO: 0.3 % (ref 0–1.8)
BASOPHILS # BLD: 0.03 K/UL (ref 0–0.12)
BLD GP AB SCN SERPL QL: NORMAL
COMPONENT R 8504R: NORMAL
EOSINOPHIL # BLD AUTO: 0.08 K/UL (ref 0–0.51)
EOSINOPHIL NFR BLD: 0.9 % (ref 0–6.9)
ERYTHROCYTE [DISTWIDTH] IN BLOOD BY AUTOMATED COUNT: 46.9 FL (ref 35.9–50)
HCT VFR BLD AUTO: 21.6 % (ref 37–47)
HCT VFR BLD AUTO: 25 % (ref 37–47)
HGB BLD-MCNC: 6.7 G/DL (ref 12–16)
HGB BLD-MCNC: 8.1 G/DL (ref 12–16)
HGB BLD-MCNC: 8.2 G/DL (ref 12–16)
IMM GRANULOCYTES # BLD AUTO: 0.08 K/UL (ref 0–0.11)
IMM GRANULOCYTES NFR BLD AUTO: 0.9 % (ref 0–0.9)
LYMPHOCYTES # BLD AUTO: 1.45 K/UL (ref 1–4.8)
LYMPHOCYTES NFR BLD: 16.1 % (ref 22–41)
MCH RBC QN AUTO: 30 PG (ref 27–33)
MCHC RBC AUTO-ENTMCNC: 32.8 G/DL (ref 33.6–35)
MCV RBC AUTO: 91.6 FL (ref 81.4–97.8)
MONOCYTES # BLD AUTO: 0.86 K/UL (ref 0–0.85)
MONOCYTES NFR BLD AUTO: 9.5 % (ref 0–13.4)
NEUTROPHILS # BLD AUTO: 6.53 K/UL (ref 2–7.15)
NEUTROPHILS NFR BLD: 72.3 % (ref 44–72)
NRBC # BLD AUTO: 0.02 K/UL
NRBC BLD AUTO-RTO: 0.2 /100 WBC
PLATELET # BLD AUTO: 146 K/UL (ref 164–446)
PMV BLD AUTO: 10 FL (ref 9–12.9)
PRODUCT TYPE UPROD: NORMAL
RBC # BLD AUTO: 2.73 M/UL (ref 4.2–5.4)
RH BLD: NORMAL
UNIT STATUS USTAT: NORMAL
WBC # BLD AUTO: 9 K/UL (ref 4.8–10.8)

## 2017-06-01 PROCEDURE — A9270 NON-COVERED ITEM OR SERVICE: HCPCS | Performed by: INTERNAL MEDICINE

## 2017-06-01 PROCEDURE — 700111 HCHG RX REV CODE 636 W/ 250 OVERRIDE (IP): Performed by: ORTHOPAEDIC SURGERY

## 2017-06-01 PROCEDURE — 30233N1 TRANSFUSION OF NONAUTOLOGOUS RED BLOOD CELLS INTO PERIPHERAL VEIN, PERCUTANEOUS APPROACH: ICD-10-PCS | Performed by: HOSPITALIST

## 2017-06-01 PROCEDURE — 86850 RBC ANTIBODY SCREEN: CPT

## 2017-06-01 PROCEDURE — 99233 SBSQ HOSP IP/OBS HIGH 50: CPT | Performed by: INTERNAL MEDICINE

## 2017-06-01 PROCEDURE — 700102 HCHG RX REV CODE 250 W/ 637 OVERRIDE(OP): Performed by: INTERNAL MEDICINE

## 2017-06-01 PROCEDURE — 97530 THERAPEUTIC ACTIVITIES: CPT

## 2017-06-01 PROCEDURE — A9270 NON-COVERED ITEM OR SERVICE: HCPCS | Performed by: ORTHOPAEDIC SURGERY

## 2017-06-01 PROCEDURE — 85014 HEMATOCRIT: CPT

## 2017-06-01 PROCEDURE — 700105 HCHG RX REV CODE 258

## 2017-06-01 PROCEDURE — 85018 HEMOGLOBIN: CPT

## 2017-06-01 PROCEDURE — 36415 COLL VENOUS BLD VENIPUNCTURE: CPT

## 2017-06-01 PROCEDURE — 700102 HCHG RX REV CODE 250 W/ 637 OVERRIDE(OP): Performed by: ORTHOPAEDIC SURGERY

## 2017-06-01 PROCEDURE — 86923 COMPATIBILITY TEST ELECTRIC: CPT

## 2017-06-01 PROCEDURE — 302128 INFUSION PUMP W/POLE: Performed by: INTERNAL MEDICINE

## 2017-06-01 PROCEDURE — 36430 TRANSFUSION BLD/BLD COMPNT: CPT

## 2017-06-01 PROCEDURE — 85025 COMPLETE CBC W/AUTO DIFF WBC: CPT

## 2017-06-01 PROCEDURE — 770006 HCHG ROOM/CARE - MED/SURG/GYN SEMI*

## 2017-06-01 PROCEDURE — 97535 SELF CARE MNGMENT TRAINING: CPT

## 2017-06-01 PROCEDURE — P9016 RBC LEUKOCYTES REDUCED: HCPCS

## 2017-06-01 PROCEDURE — 97116 GAIT TRAINING THERAPY: CPT

## 2017-06-01 PROCEDURE — 86901 BLOOD TYPING SEROLOGIC RH(D): CPT

## 2017-06-01 PROCEDURE — 86900 BLOOD TYPING SEROLOGIC ABO: CPT

## 2017-06-01 RX ORDER — BISACODYL 10 MG
10 SUPPOSITORY, RECTAL RECTAL
Status: DISCONTINUED | OUTPATIENT
Start: 2017-06-01 | End: 2017-06-02 | Stop reason: HOSPADM

## 2017-06-01 RX ORDER — SODIUM CHLORIDE 9 MG/ML
INJECTION, SOLUTION INTRAVENOUS
Status: COMPLETED
Start: 2017-06-01 | End: 2017-06-01

## 2017-06-01 RX ADMIN — SODIUM CHLORIDE: 9 INJECTION, SOLUTION INTRAVENOUS at 11:45

## 2017-06-01 RX ADMIN — OXYCODONE HYDROCHLORIDE 5 MG: 5 TABLET ORAL at 23:34

## 2017-06-01 RX ADMIN — ENOXAPARIN SODIUM 40 MG: 100 INJECTION SUBCUTANEOUS at 08:21

## 2017-06-01 RX ADMIN — ACETAMINOPHEN 650 MG: 325 TABLET, FILM COATED ORAL at 12:07

## 2017-06-01 RX ADMIN — BISACODYL 10 MG: 10 SUPPOSITORY RECTAL at 20:22

## 2017-06-01 RX ADMIN — ACETAMINOPHEN 650 MG: 325 TABLET, FILM COATED ORAL at 17:23

## 2017-06-01 RX ADMIN — OXYCODONE HYDROCHLORIDE 5 MG: 5 TABLET ORAL at 17:23

## 2017-06-01 RX ADMIN — OXYCODONE HYDROCHLORIDE 5 MG: 5 TABLET ORAL at 08:21

## 2017-06-01 RX ADMIN — STANDARDIZED SENNA CONCENTRATE AND DOCUSATE SODIUM 2 TABLET: 8.6; 5 TABLET, FILM COATED ORAL at 08:21

## 2017-06-01 RX ADMIN — ACETAMINOPHEN 650 MG: 325 TABLET, FILM COATED ORAL at 05:52

## 2017-06-01 RX ADMIN — OXYCODONE HYDROCHLORIDE 5 MG: 5 TABLET ORAL at 12:07

## 2017-06-01 RX ADMIN — ACETAMINOPHEN 650 MG: 325 TABLET, FILM COATED ORAL at 23:32

## 2017-06-01 ASSESSMENT — GAIT ASSESSMENTS
DEVIATION: STEP TO;DECREASED BASE OF SUPPORT;ANTALGIC
DISTANCE (FEET): 20
GAIT LEVEL OF ASSIST: CONTACT GUARD ASSIST
ASSISTIVE DEVICE: FRONT WHEEL WALKER

## 2017-06-01 ASSESSMENT — COGNITIVE AND FUNCTIONAL STATUS - GENERAL
WALKING IN HOSPITAL ROOM: A LITTLE
MOVING FROM LYING ON BACK TO SITTING ON SIDE OF FLAT BED: A LITTLE
MOVING TO AND FROM BED TO CHAIR: A LOT
TURNING FROM BACK TO SIDE WHILE IN FLAT BAD: A LOT
MOBILITY SCORE: 15
SUGGESTED CMS G CODE MODIFIER MOBILITY: CK
STANDING UP FROM CHAIR USING ARMS: A LITTLE
CLIMB 3 TO 5 STEPS WITH RAILING: A LOT

## 2017-06-01 ASSESSMENT — PAIN SCALES - GENERAL
PAINLEVEL_OUTOF10: 4
PAINLEVEL_OUTOF10: 2
PAINLEVEL_OUTOF10: 2
PAINLEVEL_OUTOF10: 6
PAINLEVEL_OUTOF10: 2
PAINLEVEL_OUTOF10: 6
PAINLEVEL_OUTOF10: 4
PAINLEVEL_OUTOF10: 3
PAINLEVEL_OUTOF10: 2
PAINLEVEL_OUTOF10: 2

## 2017-06-01 ASSESSMENT — ENCOUNTER SYMPTOMS
COUGH: 0
FOCAL WEAKNESS: 0
FEVER: 0
DIZZINESS: 0
PALPITATIONS: 0
SHORTNESS OF BREATH: 0
TREMORS: 0
WEAKNESS: 1
NECK PAIN: 0
NERVOUS/ANXIOUS: 0
NAUSEA: 0
DIAPHORESIS: 0
ABDOMINAL PAIN: 0
CHILLS: 0
HEADACHES: 0
CONSTIPATION: 1
FLANK PAIN: 0
MYALGIAS: 1
MEMORY LOSS: 0

## 2017-06-01 NOTE — CARE PLAN
Problem: Safety  Goal: Will remain free from injury  Outcome: PROGRESSING AS EXPECTED  Assisted with ambulation. Safety precautions in place. Bed alarm in use. Call light and personal belongings within reach. Rounds ongoing.    Problem: Pain Management  Goal: Pain level will decrease to patient’s comfort goal  Outcome: PROGRESSING AS EXPECTED  Assisted with rest and repositioning to comfort in bed. Oxycodone 5mg PRN onboard. Scheduled tylenol administered.

## 2017-06-01 NOTE — CARE PLAN
Problem: Bowel/Gastric:  Goal: Normal bowel function is maintained or improved  Pt given stool softeners to improve bowel motility.     Problem: Respiratory:  Goal: Respiratory status will improve  Ensuring pt uses the IS correctly and frequently.

## 2017-06-01 NOTE — PROGRESS NOTES
1 unit RBC started.  Pt and daughter educated on the adverse reactions.  Will continue to closely monitor.

## 2017-06-01 NOTE — PROGRESS NOTES
1 unit RBC completed.  No signs of adverse reaction.  CBC to be drawn.  Will continue to monitor.

## 2017-06-01 NOTE — THERAPY
"Pt agreeable, DTR present for entire TX, MIN A for all bed mob, LE mngt to EOB, MIN-CGA to STS, STPVT and gait with FWW was slow, guarded and pt needs frequent standing rest stops 2* to fatigue, but was on Room Air during entire Tx with O2 sats 90-92%. Pt sat UIC post tx and was educated on RICE and post Sx edema control stratigies with ankle pumps and ICE. Pt will benifit from acute/post acute rehab.Physical Therapy Treatment completed.   Bed Mobility:  Supine to Sit: Moderate Assist  Transfers: Sit to Stand: Contact Guard Assist  Gait: Level Of Assist: Contact Guard Assist with Front-Wheel Walker       Plan of Care: Will benefit from Physical Therapy 4 times per week  Discharge Recommendations: Equipment: Front-Wheel Walker. Post-acute therapy Discharge to a transitional care facility for continued skilled therapy services.     See \"Rehab Therapy-Acute\" Patient Summary Report for complete documentation.       "

## 2017-06-01 NOTE — PROGRESS NOTES
Hospital Medicine Interval Note  Date of Service:  6/1/2017    Chief Complaint  87 y.o.-year-old female admitted 5/29/2017 with fall and L hip fracture, s/p nailing 5/30    Interval Problem Update  bp stable with increasing anemia  + fatigue and pallor  Denies blurry vision  + constipation, neg abd pain  Pain controlled  - denies melena, no significant LLE edema or pain      Consultants/Specialty  Ortho/juliann    Disposition  snf in 1-2 days with clinical improvement  Pt/ot eval  Accepted at [facility]       Review of Systems   Constitutional: Negative for fever, chills, malaise/fatigue and diaphoresis.   HENT: Negative for congestion and hearing loss.    Respiratory: Negative for cough and shortness of breath.    Cardiovascular: Negative for palpitations and leg swelling.   Gastrointestinal: Positive for constipation. Negative for nausea and abdominal pain.   Genitourinary: Negative for flank pain.   Musculoskeletal: Positive for myalgias and joint pain. Negative for neck pain.   Neurological: Positive for weakness. Negative for dizziness, tremors, focal weakness and headaches.   Psychiatric/Behavioral: Negative for memory loss. The patient is not nervous/anxious.       Physical Exam Laboratory/Imaging   Filed Vitals:    06/01/17 1200 06/01/17 1215 06/01/17 1230 06/01/17 1500   BP: 117/49 114/48 106/47 111/51   Pulse: 75 81 76 81   Temp: 36.7 °C (98 °F) 36.9 °C (98.4 °F) 36.7 °C (98.1 °F) 36.7 °C (98.1 °F)   Resp: 14 14 13 14   Height:       Weight:       SpO2: 92% 93% 91% 96%     Physical Exam   Constitutional: She is oriented to person, place, and time. She appears well-nourished. No distress.   HENT:   Head: Normocephalic and atraumatic.   Nose: Nose normal.   Eyes: EOM are normal. Pupils are equal, round, and reactive to light.   Neck: Normal range of motion. No JVD present.   Cardiovascular: Normal rate and intact distal pulses.    Murmur heard.  Pulmonary/Chest: Effort normal and breath sounds normal. No  respiratory distress. She has no wheezes. She has no rales.   Abdominal: Soft. Bowel sounds are normal. She exhibits no distension. There is no tenderness.   Musculoskeletal: She exhibits edema. She exhibits no tenderness.   Neurological: She is alert and oriented to person, place, and time. No cranial nerve deficit. She exhibits normal muscle tone. Coordination normal.   Skin: Skin is warm and dry. There is pallor.   Dressing c/d/i     Psychiatric: Her behavior is normal. Judgment and thought content normal.   Nursing note and vitals reviewed.   Lab Results   Component Value Date/Time    WBC 9.0 05/31/2017 02:45 AM    HEMOGLOBIN 6.7* 06/01/2017 08:57 AM    HEMATOCRIT 21.6* 06/01/2017 02:53 AM    PLATELET COUNT 142* 05/31/2017 02:45 AM     Lab Results   Component Value Date/Time    SODIUM 136 05/31/2017 02:45 AM    POTASSIUM 4.3 05/31/2017 02:45 AM    CHLORIDE 107 05/31/2017 02:45 AM    CO2 22 05/31/2017 02:45 AM    GLUCOSE 224* 05/31/2017 02:45 AM    BUN 21 05/31/2017 02:45 AM    CREATININE 0.80 05/31/2017 02:45 AM      Assessment/Plan    * Intertrochanteric fracture (CMS-[facility])  Assessment & Plan  S/p L hip nailing, 5/30  Pain control  Pt/ot  - accepted at [facility]  - monitor h/h, anemia      Fall  Assessment & Plan  Pt/ot  mechanical    Hyperglycemia  Assessment & Plan  -a1c 5.7  monitor    Hypotension  Assessment & Plan  Resolved  Cont ivf  Orthostatic vital  Echo- ef 65, G2, mod TR        Anemia  Assessment & Plan  Monitor- increasing  - transfuse 1 u prbc, f/u cbc now    - h/h q12  Avoid nsaids  - dc lovenox  - scd       Labs reviewed, Medications reviewed and Radiology images reviewed        DVT Prophylaxis: Enoxaparin (Lovenox)    Ulcer prophylaxis: Not indicated    Assessed for rehab: Patient was assess for and/or received rehabilitation services during this hospitalization

## 2017-06-01 NOTE — DISCHARGE PLANNING
Medical SW    Referral: per AM rounds MD may d/c pt tomorrow to SNF    Intervention: Sw spoke to [facility] who [facility]. Report left for Friday SW.      Plan: Sw to assist w/ d/c planning as needed.

## 2017-06-01 NOTE — PROGRESS NOTES
Pt is AAOx4.  Reports a 4/10 L hip pain.  Medicated per MAR.  L hip dressing in place, CDI.  2L O2 running via NC, pt usually does not wear O2.  PIV patent, saline locked.   POC discussed.  All needs met at this time.  Bed in low position.    Call light within reach.  Rounding in place.

## 2017-06-02 ENCOUNTER — PATIENT OUTREACH (OUTPATIENT)
Dept: HEALTH INFORMATION MANAGEMENT | Facility: OTHER | Age: 82
End: 2017-06-02

## 2017-06-02 VITALS
HEART RATE: 75 BPM | DIASTOLIC BLOOD PRESSURE: 63 MMHG | RESPIRATION RATE: 18 BRPM | OXYGEN SATURATION: 91 % | BODY MASS INDEX: 26.61 KG/M2 | HEIGHT: 64 IN | TEMPERATURE: 98.1 F | WEIGHT: 155.87 LBS | SYSTOLIC BLOOD PRESSURE: 111 MMHG
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
## 2017-06-02 LAB
ANION GAP SERPL CALC-SCNC: 8 MMOL/L (ref 0–11.9)
BASOPHILS # BLD AUTO: 0.4 % (ref 0–1.8)
BASOPHILS # BLD: 0.03 K/UL (ref 0–0.12)
BUN SERPL-MCNC: 20 MG/DL (ref 8–22)
CALCIUM SERPL-MCNC: 8.2 MG/DL (ref 8.5–10.5)
CHLORIDE SERPL-SCNC: 105 MMOL/L (ref 96–112)
CO2 SERPL-SCNC: 23 MMOL/L (ref 20–33)
CREAT SERPL-MCNC: 0.88 MG/DL (ref 0.5–1.4)
EKG IMPRESSION: NORMAL
EOSINOPHIL # BLD AUTO: 0.14 K/UL (ref 0–0.51)
EOSINOPHIL NFR BLD: 1.9 % (ref 0–6.9)
ERYTHROCYTE [DISTWIDTH] IN BLOOD BY AUTOMATED COUNT: 48.2 FL (ref 35.9–50)
GFR SERPL CREATININE-BSD FRML MDRD: >60 ML/MIN/1.73 M 2
GLUCOSE SERPL-MCNC: 122 MG/DL (ref 65–99)
HCT VFR BLD AUTO: 24.2 % (ref 37–47)
HCT VFR BLD AUTO: 24.3 % (ref 37–47)
HGB BLD-MCNC: 7.8 G/DL (ref 12–16)
HGB BLD-MCNC: 8.5 G/DL (ref 12–16)
IMM GRANULOCYTES # BLD AUTO: 0.08 K/UL (ref 0–0.11)
IMM GRANULOCYTES NFR BLD AUTO: 1.1 % (ref 0–0.9)
LYMPHOCYTES # BLD AUTO: 1.58 K/UL (ref 1–4.8)
LYMPHOCYTES NFR BLD: 21.2 % (ref 22–41)
MAGNESIUM SERPL-MCNC: 2.1 MG/DL (ref 1.5–2.5)
MCH RBC QN AUTO: 29.5 PG (ref 27–33)
MCHC RBC AUTO-ENTMCNC: 32.1 G/DL (ref 33.6–35)
MCV RBC AUTO: 92 FL (ref 81.4–97.8)
MONOCYTES # BLD AUTO: 0.85 K/UL (ref 0–0.85)
MONOCYTES NFR BLD AUTO: 11.4 % (ref 0–13.4)
NEUTROPHILS # BLD AUTO: 4.77 K/UL (ref 2–7.15)
NEUTROPHILS NFR BLD: 64 % (ref 44–72)
NRBC # BLD AUTO: 0.02 K/UL
NRBC BLD AUTO-RTO: 0.3 /100 WBC
PLATELET # BLD AUTO: 153 K/UL (ref 164–446)
PMV BLD AUTO: 10.1 FL (ref 9–12.9)
POTASSIUM SERPL-SCNC: 4 MMOL/L (ref 3.6–5.5)
RBC # BLD AUTO: 2.64 M/UL (ref 4.2–5.4)
SODIUM SERPL-SCNC: 136 MMOL/L (ref 135–145)
WBC # BLD AUTO: 7.5 K/UL (ref 4.8–10.8)

## 2017-06-02 PROCEDURE — 700102 HCHG RX REV CODE 250 W/ 637 OVERRIDE(OP): Performed by: INTERNAL MEDICINE

## 2017-06-02 PROCEDURE — A9270 NON-COVERED ITEM OR SERVICE: HCPCS | Performed by: NURSE PRACTITIONER

## 2017-06-02 PROCEDURE — A9270 NON-COVERED ITEM OR SERVICE: HCPCS | Performed by: ORTHOPAEDIC SURGERY

## 2017-06-02 PROCEDURE — 85018 HEMOGLOBIN: CPT

## 2017-06-02 PROCEDURE — 36415 COLL VENOUS BLD VENIPUNCTURE: CPT

## 2017-06-02 PROCEDURE — 93010 ELECTROCARDIOGRAM REPORT: CPT | Performed by: INTERNAL MEDICINE

## 2017-06-02 PROCEDURE — 93005 ELECTROCARDIOGRAM TRACING: CPT | Performed by: NURSE PRACTITIONER

## 2017-06-02 PROCEDURE — 83735 ASSAY OF MAGNESIUM: CPT

## 2017-06-02 PROCEDURE — 85014 HEMATOCRIT: CPT

## 2017-06-02 PROCEDURE — 99239 HOSP IP/OBS DSCHRG MGMT >30: CPT | Performed by: HOSPITALIST

## 2017-06-02 PROCEDURE — 80048 BASIC METABOLIC PNL TOTAL CA: CPT

## 2017-06-02 PROCEDURE — 700102 HCHG RX REV CODE 250 W/ 637 OVERRIDE(OP): Performed by: ORTHOPAEDIC SURGERY

## 2017-06-02 PROCEDURE — 85025 COMPLETE CBC W/AUTO DIFF WBC: CPT

## 2017-06-02 PROCEDURE — A9270 NON-COVERED ITEM OR SERVICE: HCPCS | Performed by: INTERNAL MEDICINE

## 2017-06-02 PROCEDURE — 700102 HCHG RX REV CODE 250 W/ 637 OVERRIDE(OP): Performed by: NURSE PRACTITIONER

## 2017-06-02 RX ORDER — ACETAMINOPHEN 325 MG/1
650 TABLET ORAL EVERY 6 HOURS
Qty: 30 TAB | Refills: 0
Start: 2017-06-02 | End: 2017-11-14

## 2017-06-02 RX ORDER — AMOXICILLIN 250 MG
2 CAPSULE ORAL DAILY
Start: 2017-06-02 | End: 2017-09-19

## 2017-06-02 RX ORDER — TRAMADOL HYDROCHLORIDE 50 MG/1
50-100 TABLET ORAL EVERY 6 HOURS PRN
Qty: 60 TAB | Refills: 1
Start: 2017-06-02 | End: 2017-11-14

## 2017-06-02 RX ADMIN — STANDARDIZED SENNA CONCENTRATE AND DOCUSATE SODIUM 2 TABLET: 8.6; 5 TABLET, FILM COATED ORAL at 08:57

## 2017-06-02 RX ADMIN — OXYCODONE HYDROCHLORIDE 5 MG: 5 TABLET ORAL at 13:09

## 2017-06-02 RX ADMIN — ACETAMINOPHEN 650 MG: 325 TABLET, FILM COATED ORAL at 13:09

## 2017-06-02 RX ADMIN — ACETAMINOPHEN 650 MG: 325 TABLET, FILM COATED ORAL at 06:36

## 2017-06-02 RX ADMIN — OXYCODONE HYDROCHLORIDE 5 MG: 5 TABLET ORAL at 08:58

## 2017-06-02 RX ADMIN — METOPROLOL TARTRATE 12.5 MG: 25 TABLET, FILM COATED ORAL at 06:35

## 2017-06-02 ASSESSMENT — PAIN SCALES - GENERAL
PAINLEVEL_OUTOF10: 2
PAINLEVEL_OUTOF10: 2

## 2017-06-02 ASSESSMENT — LIFESTYLE VARIABLES: EVER_SMOKED: NEVER

## 2017-06-02 NOTE — PROGRESS NOTES
Call received by RN to discuss patient with irregular HR, tachycardia. EKG obtained shows afib. Denies history. Pt with worsening anemia, anticoagulation stopped d/t anemia. PT asymptomatic, denies CP or SOB.     Patient seen and examined in BS, denies symptoms. Alert and oriented. BS clear, NAD.    ECHO 5/31 LVEF 65%, grade II diastolic dysfunction    Plan:   -Transfer to telemetry  -Metoprolol 12.5mg BID; metoprolol 5mg IVP PRN HR above 120  -Pt is not a candidate for AC at this time d/t anemia requiring transfusion (CHADSVASC2 score is 3- should discuss this once OK per ortho and anemia is stable)   -May need cards consult in AM    Orders given as appropriate, see order summary.     Rounding MD/APN to reassess in AM.  RN to call with changes.

## 2017-06-02 NOTE — PROGRESS NOTES
Irregular heart rate noted and tachycardia.  Pt asymptomatic but mentioned about her history of mitral regurgitation per pt's PCP.  EKG order obtained from hospitalist on-call.  Will continue to monitor.

## 2017-06-02 NOTE — DISCHARGE SUMMARY
CHIEF COMPLAINT ON ADMISSION  Chief Complaint   Patient presents with   • GLF   • Leg Pain     left       CODE STATUS  Full Code    HPI & HOSPITAL COURSE  This is a 87 y.o. year old female admitted 5/29/17 after fall in backyard found to have left comminuted displaced intertrochanteric femur fracture.  She has chronic PAF, but was not on anticoagulation, followed by Cardiologist.  Patient was transferred to telemetry for afib with RVR, but converted to SR.  She was started on metoprolol.  She was completely asymptomatic and wanted to ambulate with her FWW.  She very much would like to transfer to rehab facility.  No AC at this time post op left hip IM nailing.  She will be placed on ASA 81mg po bid x 6 weeks for DVT prophylaxis per ortho.  She was on no prior AC.  Her blood pressure is slightly low, but she is drinking fluids.  I will de-escalate her oxycodone to tramadol prn to help with her blood pressure.  She is felt safe for dc to acute SNF rehab and encourage po fluid intake.  On 6/1/17 she was transfused 1 unit PRBCs for Hg drop to 6.7 post op, but her Hg has remained steady at 8.5 at dc and no overt signs of bleeding.  Her left hip wounds were CD&I on exam without obvious hematoma.  HR 75 SR, /63 and 91% on RA.    SPECIFIC OUTPATIENT FOLLOW-UP  Follow up Dr. Pimentel in 10-14 days post op hip repair for suture removal and recheck.      Therefore, she is discharged in good and stable condition for further post-acute management.       DISCHARGE PROBLEM LIST  Principal Problem:    Intertrochanteric fracture ([facility]) POA: Yes  Active Problems:    Osteoporosis POA: Yes      Overview: Dexa in 2009-osteoporosis.      Declines biphosphonate.          Hyperlipidemia LDL goal <100 POA: Yes    Hyperglycemia POA: Yes    Hypotension POA: Yes    Anemia POA: Yes  Resolved Problems:    Fall POA: Yes      FOLLOW UP  Future Appointments  Date Time Provider Department Center   7/19/2017 1:20 PM Doug Lewis M.D. [facility]  ZENAIDA Schmidt M.D.  [address] [place] [address]  [phone]    In 2 weeks  6/14      MEDICATIONS ON DISCHARGE   Char Rockwell   Home Medication Instructions [id number]    Printed on:06/02/17 1324   Medication Information                      acetaminophen (TYLENOL) 325 MG Tab  Take 2 Tabs by mouth every 6 hours.             aspirin EC (ECOTRIN) 81 MG Tablet Delayed Response  Take 1 Tab by mouth 2 times a day, with meals for 42 days.             metoprolol (LOPRESSOR) 25 MG Tab  Take 0.5 Tabs by mouth 2 Times a Day.             senna-docusate (PERICOLACE OR SENOKOT S) 8.6-50 MG Tab  Take 2 Tabs by mouth every day.             tramadol (ULTRAM) 50 MG Tab  Take 1-2 Tabs by mouth every 6 hours as needed for Moderate Pain or Severe Pain.                 DIET  Orders Placed This Encounter   Procedures   • Diet Order     Standing Status: Standing      Number of Occurrences: 1      Standing Expiration Date:      Order Specific Question:  Diet:     Answer:  Regular [1]     Order Specific Question:  Miscellaneous modifications:     Answer:  Lactose Free per PT [7]       ACTIVITY  As tolerated and directed by skilled nursing.  Class 2 - comfortable at rest but have symptoms with ordinary physcial activity.    LINES, DRAINS, AND WOUNDS  This is an automated list. Peripheral IVs will be removed prior to discharge.  PIV Group Left Forearm Saline Lock (Active)   Line Secured Transparent;Taped 6/1/2017 10:00 PM   Site Condition / Description Assessed;Clean;Dry;Intact 6/1/2017 10:00 PM   Dressing Type / Description Transparent;Clean;Dry;Intact 6/1/2017 10:00 PM   Dressing Status Observed 6/1/2017 10:00 PM   Saline Locked Yes 6/1/2017 10:00 PM   Infiltration Grading Used by [facility] 0 6/1/2017 10:00 PM   Phlebitis Scale (Used by [facility]) 0 6/1/2017 10:00 PM   Date Primary Tubing Changed 05/30/17 5/31/2017  9:00 AM   NEXT Primary Tubing Change  06/03/17 5/31/2017  9:00 AM   NEXT Site  Change Date 06/05/17 5/29/2017 11:00 PM       Surgical Incision  Incision Left Hip (Active)   Wound Bed Other (comment) 6/1/2017  8:00 AM   Drainage  None 6/1/2017 10:00 PM   Periwound Skin Other (Comments) 6/1/2017  8:00 AM   Daily - Wound Closure Not Assessed 6/1/2017 10:00 PM   Dressing Options Dry Gauze;Tape 6/1/2017 10:00 PM   Dressing Status / Change Clean;Dry;Intact 6/1/2017 10:00 PM   Daily - Dressing Change Observed 6/1/2017 10:00 PM                  MENTAL STATUS ON TRANSFER  Level of Consciousness: Alert  Orientation : Oriented x 4  Speech: Speech Clear    CONSULTATIONS  Dr. Sen-orthopedic surgeon    PROCEDURES  5/30/17- IM nailing of left hip by Dr. Pimentel.    LABORATORY  Lab Results   Component Value Date/Time    SODIUM 136 06/02/2017 07:14 AM    POTASSIUM 4.0 06/02/2017 07:14 AM    CHLORIDE 105 06/02/2017 07:14 AM    CO2 23 06/02/2017 07:14 AM    GLUCOSE 122* 06/02/2017 07:14 AM    BUN 20 06/02/2017 07:14 AM    CREATININE 0.88 06/02/2017 07:14 AM        Lab Results   Component Value Date/Time    WBC 7.5 06/02/2017 02:48 AM    HEMOGLOBIN 8.5* 06/02/2017 08:37 AM    HEMATOCRIT 24.2* 06/02/2017 02:48 AM    HEMATOCRIT 24.3* 06/02/2017 02:48 AM    PLATELET COUNT 153* 06/02/2017 02:48 AM        Total time of the discharge process exceeds 32 minutes.

## 2017-06-02 NOTE — DISCHARGE PLANNING
Per the request of the SW on floor the Debbie WETZEL called and spoke to Francesca at [facility]. Transportation has been arranged to transfer the patient at 1500 via the [facility]. SW on floor [facility] has been notified via phone.

## 2017-06-02 NOTE — CARE PLAN
Problem: Safety  Goal: Will remain free from injury  Outcome: PROGRESSING AS EXPECTED  Assisted with ambulation. Safety precautions in place. Call light and personal belongings within reach. Rounds ongoing.    Problem: Bowel/Gastric:  Goal: Normal bowel function is maintained or improved  Outcome: PROGRESSING AS EXPECTED  Suppository given. enourage increase water intake and mobility.

## 2017-06-02 NOTE — PROGRESS NOTES
Received bedside report. Assumed care of patient. Patient A&Ox4, included in plan of care. Patient reports no pain, SOB, palpitations. Patient educated on safety and use of call light.   Call light within reach, bed locked and at lowest position.

## 2017-06-02 NOTE — DISCHARGE INSTRUCTIONS
Discharge Instructions    Discharged to other by [facility] with escort. Discharged via wheelchair, hospital escort: Yes.  Special equipment needed: Not Applicable    Be sure to schedule a follow-up appointment with your primary care doctor or any specialists as instructed.     Discharge Plan:   Diet Plan: Discussed  Activity Level: Discussed  Confirmed Follow up Appointment: Appointment Scheduled  Confirmed Symptoms Management: Discussed  Medication Reconciliation Updated: Yes  Influenza Vaccine Indication: Patient Refuses    I understand that a diet low in cholesterol, fat, and sodium is recommended for good health. Unless I have been given specific instructions below for another diet, I accept this instruction as my diet prescription.   Other diet: regular - lactose free    Special Instructions: Discharge instructions for the Orthopedic Patient    Follow up with Primary Care Physician within 2 weeks of discharge to home, regarding:  Review of medications and diagnostic testing.  Surveillance for medical complications.  Workup and treatment of osteoporosis, if appropriate.     -Is this a Joint Replacement patient? Yes   Total Joint Hip Replacement Discharge Instructions    Pain  - The goal is to slowly wean off the prescription pain medicine.  - Ice can be used for pain control.  20 minutes at a time is recommended, and never directly against your skin or incision.  - Most patients are off the pain pills by 3 weeks; others may require a low level of pain medications for many months. If your pain continues to be severe, follow up with your physician.  Infection  Deep hip joint infections that require removal of the prostheses occur in less than 0.1% of patients. Lesser infections in the skin (cellulites) are more common and much more easily treated.  - Keep the incision as clean and dry as possible.  - Always wash your hands before touching your incision.  - Skin infections tend to develop around 7-10 days after  surgery, most can be treated with oral antibiotics.  - Dental Care should be delayed for 3 months after surgery, your surgeon recommends taking a dose of antibiotics 1 hour prior to any dental procedure.  After 2 years, most surgeons recommend antibiotics only before an extensive procedure.  Ask your surgeon what he recommends.  - Signs and symptoms of infection can include:  low grade fever, redness, pain, swelling and drainage from your incision.  Notify your surgeon immediately if you develop any of these symptoms.  Post op Disturbances  - Bowel habits - constipation is extremely common and is caused by a combination of anesthesia, lack of mobility and pain medicine.  Use stool softeners or laxatives if necessary. It is important not to ignore this problem, as bowel obstructions can be a serious complication after joint replacement surgery.  - Mood/Energy Level - Many patients experience a lack of energy and endurance for up to 2-3 months after surgery.  Some may also feel down and can even become depressed.  This is likely due to the postoperative anemia, change in activity level, lack of sleep, pain medicine and just the emotional reaction to the surgery itself that is a big disruption in a person’s life.  This usually passes.  If symptoms persist, follow up with your primary physician.  - Returning to work - Your surgeon will give you more specific instructions.  Generally, if you work a sedentary job requiring little standing or walking, most patients may return within 2-6 weeks.  Manual labor jobs involving walking, lifting and standing may take 3-4 months.  Your surgeon’s office can provide a release to part-time or light duty work early on in your recovery and progress you to full duty as able.  - Driving - You can begin driving an automatic shift car in 4 to 8 weeks, provided you are no longer taking narcotic pain medication. If you have a stick-shift car and your right hip was replaced, do not begin  driving until your doctor says you can.   - Avoiding falls -  throw rugs and tack down loose carpeting.  Be aware of floor hazards such as pets, small objects or uneven surfaces.   -  Airport Metal Detectors - The sensitivity of metal detectors varies and it is likely that your prosthesis will cause an alarm. Inform the  that you have an artificial joint.  Diet  - Resume your normal diet as tolerated.  - It is important to achieve a healthy nutritional status by eating a well balanced diet on a regular basis.  - Your physician may recommend that you take iron and vitamin supplements.   - Continue to drink plenty of fluids.  Shower/Bathing  - You may shower as soon as you get home from the hospital unless otherwise instructed.  - Keep your incision out of water.  To keep the incision dry when showering, cover it with a plastic bag or plastic wrap.  - Pat incision dry if it gets wet.  Don’t rub.  - Do not submerge in a bath until staples are out and the incision is completely healed. (Approximately 6-8 weeks after surgery).  Dressing Change:  Procedure (if recommended by your physician)  - Wash hands.  - Open all dressing change materials.  - Remove old dressing and discard.  - Inspect incision for redness, increase in clear drainage, yellow/green drainage, odor and surrounding skin hot to touch.  -  ABD (large gauze) pad by one corner and lay over the incision.  Be careful not to touch the inside of the dressing that will lay over the incision.  - Secure in place as instructed (Ace wrap or tape).    Swelling/Bruising  - Swelling is normal after hip replacement and can involve the thigh, knee, calf and foot.  - Swelling can last from 3-6 months.  - Elevate your leg higher than your heart while reclining.  The first week you are home you should elevate your leg an equal amount of time, as you are active.    - Anti-inflammatory pills can be taken once you have stopped the blood  thinners.  - The swelling is usually worse after you go home since you are upright for longer periods of time.  - Bruising is common and can involve the entire leg including the thigh, calf and even foot.  Bruising often does not appear until after you arrive home and it can be quite dramatic- purple, black, green.  The bruising you can see is not usually concerning and will subside without any treatment.      Blood Clot Prevention  Blood clots in the legs and the less common, but frightening, clots that travel to the lungs are a real focus of our preventative. Most patients are at standard risk for them, but those patients who are at higher risk include people who have had previous clots, a family history of clotting, smoking, diabetes, obesity, advanced age, use of estrogen and a sedentary lifestyle.    - Signs of blood clots in legs - Swelling in thigh, calf or ankle that does not go down with elevation.  Pain, heat and tenderness in calf, back of calf or groin area.  NOTE: blood clots can occur in either leg.  - You have been receiving anticoagulant therapy (blood thinners) in the hospital and you may be instructed to continue at home depending on your risk factors.  - Your risk for developing a clot continues for up to 2-3 months after surgery.  You should avoid prolonged sitting and dehydration during that time (long air trips and car trips).  If you do take a trip during this time, please get up and move around every 1- 1.5 hours.  - If you are prescribed blood thinning medication for home, follow instructions as directed. (Handouts provided if applicable).      Activity    Once you get home, you should stay active. The key is not to overdo it! While you can expect some good days and some bad days, you should notice a gradual improvement over time you should notice a gradual improvement and a gradual increase in your endurance over the next 6 to 12 months.    - Weight Bearing - If you have undergone cemented  or hybrid hip replacement, you can put some weight on the leg immediately using a cane or walker, and you should continue to use some support for 4 to 6 weeks to help the muscles recover.   - Sleeping Positions - Sleep on your back with your legs slightly apart or on your side with a regular pillow between your knees. Be sure to use the pillow for at least 6 weeks, or until your doctor says you can do without it. Sleeping on your stomach should be all right  - Sitting - For at least the first 3 months, sit only in chairs that have arms. Do not sit on low chairs, low stools, or reclining chairs. Do not cross your legs at the knees. The physical therapist will show you how to sit and stand from a chair, keeping your affected leg out in front of you. Get up and move around on a regular basis--at least once every hour.  - Walking - Walk as much as you like once your doctor gives you the go-ahead, but remember that walking is no substitute for your prescribed exercises. Walking with a pair of trekking poles is helpful and adds as much as 40% to the exercise you get when you walk  - Therapy may be needed in some cases, to strengthen your muscles and improve your gait (walking pattern).  This decision will be made at your post-operative appointment.  Follow your therapist recommended post-operative exercises (handout provided by Therapist).  - Swimming is also recommended; you can begin as soon as the sutures have been removed and the wound is healed, approximately 6 to 8 weeks after surgery. Using a pair of training fins may make swimming a more enjoyable and effective exercise.  - Other activities - Lower impact activities are preferred.  If you have specific questions, consult your Surgeon.    - Sexual activity - Your surgeon can tell you when it should be safe to resume sexual activity.      When to Call the Doctor   Call the physician if:   - Fever over 100.5? F  - Increased pain, drainage, redness, odor or heat  around the incision area  - Shaking chills  - Increased knee pain with activity and rest  - Increased pain in calf, tenderness or redness above or below the knee  - Increased swelling of calf, ankle, foot  - Sudden increased shortness of breath, sudden onset of chest pain, localized chest pain with coughing  - Incision opening  Or, if there are any questions or concerns about medications or care.       -Is this patient being discharged with medication to prevent blood clots?  Yes, Aspirin Aspirin, ASA oral tablets  What is this medicine?  ASPIRIN (AS pir in) is a pain reliever. It is used to treat mild pain and fever. This medicine is also used as directed by a doctor to prevent and to treat heart attacks, to prevent strokes, and to treat arthritis or inflammation.  This medicine may be used for other purposes; ask your health care provider or pharmacist if you have questions.  COMMON BRAND NAME(S): Aspir-Low, Aspir-Izzy , Aspirtab , Serious Energy Advanced Aspirin, Serious Energy Aspirin Extra Strength, Serious Energy Aspirin Plus, Stanford Aspirin, Serious Energy Genuine Aspirin, Serious Energy Womens Aspirin , Bufferin Extra Strength, Bufferin Low Dose, Bufferin  What should I tell my health care provider before I take this medicine?  They need to know if you have any of these conditions:  -anemia  -asthma  -bleeding problems  -child with chickenpox, the flu, or other viral infection  -diabetes  -gout  -if you frequently drink alcohol containing drinks  -kidney disease  -liver disease  -low level of vitamin K  -lupus  -smoke tobacco  -stomach ulcers or other problems  -an unusual or allergic reaction to aspirin, tartrazine dye, other medicines, dyes, or preservatives  -pregnant or trying to get pregnant  -breast-feeding  How should I use this medicine?  Take this medicine by mouth with a glass of water. Follow the directions on the package or prescription label. You can take this medicine with or without food. If it upsets your stomach, take it with food. Do  not take your medicine more often than directed.  Talk to your pediatrician regarding the use of this medicine in children. While this drug may be prescribed for children as young as 12 years of age for selected conditions, precautions do apply. Children and teenagers should not use this medicine to treat chicken pox or flu symptoms unless directed by a doctor.  Patients over 65 years old may have a stronger reaction and need a smaller dose.  Overdosage: If you think you have taken too much of this medicine contact a poison control center or emergency room at once.  NOTE: This medicine is only for you. Do not share this medicine with others.  What if I miss a dose?  If you are taking this medicine on a regular schedule and miss a dose, take it as soon as you can. If it is almost time for your next dose, take only that dose. Do not take double or extra doses.  What may interact with this medicine?  Do not take this medicine with any of the following medications:  -cidofovir  -ketorolac  -probenecid  This medicine may also interact with the following medications:  -alcohol  -alendronate  -bismuth subsalicylate  -flavocoxid  -herbal supplements like feverfew, garlic, salvador, ginkgo biloba, horse chestnut  -medicines for diabetes or glaucoma like acetazolamide, methazolamide  -medicines for gout  -medicines that treat or prevent blood clots like enoxaparin, heparin, ticlopidine, warfarin  -other aspirin and aspirin-like medicines  -NSAIDs, medicines for pain and inflammation, like ibuprofen or naproxen  -pemetrexed  -sulfinpyrazone  -varicella live vaccine  This list may not describe all possible interactions. Give your health care provider a list of all the medicines, herbs, non-prescription drugs, or dietary supplements you use. Also tell them if you smoke, drink alcohol, or use illegal drugs. Some items may interact with your medicine.  What should I watch for while using this medicine?  If you are treating yourself  for pain, tell your doctor or health care professional if the pain lasts more than 10 days, if it gets worse, or if there is a new or different kind of pain. Tell your doctor if you see redness or swelling. Also, check with your doctor if you have a fever that lasts for more than 3 days. Only take this medicine to prevent heart attacks or blood clotting if prescribed by your doctor or health care professional.  Do not take aspirin or aspirin-like medicines with this medicine. Too much aspirin can be dangerous. Always read the labels carefully.  This medicine can irritate your stomach or cause bleeding problems. Do not smoke cigarettes or drink alcohol while taking this medicine. Do not lie down for 30 minutes after taking this medicine to prevent irritation to your throat.  If you are scheduled for any medical or dental procedure, tell your healthcare provider that you are taking this medicine. You may need to stop taking this medicine before the procedure.  What side effects may I notice from receiving this medicine?  Side effects that you should report to your doctor or health care professional as soon as possible:  -allergic reactions like skin rash, itching or hives, swelling of the face, lips, or tongue  -black, tarry stools  -bloody, coffee ground-like vomit  -breathing problems  -changes in hearing, ringing in the ears  -confusion  -general ill feeling or flu-like symptoms  -pain on swallowing  -redness, blistering, peeling or loosening of the skin, including inside the mouth or nose  -trouble passing urine or change in the amount of urine  -unusual bleeding or bruising  -unusually weak or tired  -yellowing of the eyes or skin  Side effects that usually do not require medical attention (report to your doctor or health care professional if they continue or are bothersome):  -diarrhea or constipation  -nausea, vomiting  -stomach gas, heartburn  This list may not describe all possible side effects. Call your  doctor for medical advice about side effects. You may report side effects to FDA at 4-920-FUG-3197.  Where should I keep my medicine?  Keep out of the reach of children.  Store at room temperature between 15 and 30 degrees C (59 and 86 degrees F). Protect from heat and moisture. Do not use this medicine if it has a strong vinegar smell. Throw away any unused medicine after the expiration date.  NOTE: This sheet is a summary. It may not cover all possible information. If you have questions about this medicine, talk to your doctor, pharmacist, or health care provider.  © 2014, [employer]/Gold Standard. (3/10/2009 10:44:17 AM)      · Is patient discharged on Warfarin / Coumadin?   No     · Is patient Post Blood Transfusion?  No    Depression / Suicide Risk    As you are discharged from this Critical access hospital facility, it is important to learn how to keep safe from harming yourself.    Recognize the warning signs:  · Abrupt changes in personality, positive or negative- including increase in energy   · Giving away possessions  · Change in eating patterns- significant weight changes-  positive or negative  · Change in sleeping patterns- unable to sleep or sleeping all the time   · Unwillingness or inability to communicate  · Depression  · Unusual sadness, discouragement and loneliness  · Talk of wanting to die  · Neglect of personal appearance   · Rebelliousness- reckless behavior  · Withdrawal from people/activities they love  · Confusion- inability to concentrate     If you or a loved one observes any of these behaviors or has concerns about self-harm, here's what you can do:  · Talk about it- your feelings and reasons for harming yourself  · Remove any means that you might use to hurt yourself (examples: pills, rope, extension cords, firearm)  · Get professional help from the community (Mental Health, Substance Abuse, psychological counseling)  · Do not be alone:Call your Safe Contact- someone whom you trust who will be  there for you.  · Call your local CRISIS HOTLINE 624-1676 or 191-408-8610  · Call your local Children's Mobile Crisis Response Team [place] (936) 367-0742 or www.Paradine  · Call the toll free National Suicide Prevention Hotlines   · National Suicide Prevention Lifeline 336-469-GJSM (2566)  · National [employer] Line Network 800-SUICIDE (577-2956)

## 2017-06-02 NOTE — PROGRESS NOTES
Pt arrived to floor. Report received. Assumed care. Pt not in pain, not in distress, and AOx4.  at bedside.

## 2017-06-08 NOTE — DOCUMENTATION QUERY
DOCUMENTATION QUERY    PROVIDERS: Please select “Cosign w/ note”to reply to query.    To better represent the severity of illness of your patient, please review the following information and exercise your independent professional judgment in responding to this query.     Anemia is documented in the Progress Notes and Discharge Summary, status post IM Hip Nailing performed in the Operating Room 5/30/17. Based upon the clinical findings, risk factors, and treatment, can this diagnosis be further specified?    • Acute blood loss anemia  • Other type of anemia (please specify type)  • Other explanation of clinical findings (please document)  • Unable to determine         The medical record reflects the following:   Clinical Findings  Hg 13.8 & HCT 41.7 pre-op; Hg dropped to a low of 6.7 and HCT dropped to a low of 21.6 post-op   Treatment  PRBC transfusion 6/1, anticoagulation stopped   Risk Factors  Left intertrochanteric fracture status post IM nailing performed in OR 5/30   Location within medical record  Progress Notes, Discharge Summary and Lab Results      Thank you,   Elizabeth Ellis, [facility]  Inpatient   [employer]  [email or web address]

## 2017-06-29 ENCOUNTER — OFFICE VISIT (OUTPATIENT)
Dept: MEDICAL GROUP | Facility: MEDICAL CENTER | Age: 82
End: 2017-06-29
Payer: MEDICARE

## 2017-06-29 VITALS
SYSTOLIC BLOOD PRESSURE: 156 MMHG | HEIGHT: 64 IN | TEMPERATURE: 99.1 F | HEART RATE: 77 BPM | DIASTOLIC BLOOD PRESSURE: 64 MMHG | BODY MASS INDEX: 21.48 KG/M2 | OXYGEN SATURATION: 94 % | WEIGHT: 125.8 LBS

## 2017-06-29 DIAGNOSIS — R01.1 SYSTOLIC MURMUR: ICD-10-CM

## 2017-06-29 DIAGNOSIS — Z98.41 H/O BILATERAL CATARACT EXTRACTION: ICD-10-CM

## 2017-06-29 DIAGNOSIS — M81.0 OSTEOPOROSIS, UNSPECIFIED OSTEOPOROSIS TYPE, UNSPECIFIED PATHOLOGICAL FRACTURE PRESENCE: ICD-10-CM

## 2017-06-29 DIAGNOSIS — R03.0 ELEVATED BLOOD PRESSURE, SITUATIONAL: ICD-10-CM

## 2017-06-29 DIAGNOSIS — R73.03 PREDIABETES: ICD-10-CM

## 2017-06-29 DIAGNOSIS — S72.142D CLOSED DISPLACED INTERTROCHANTERIC FRACTURE OF LEFT FEMUR WITH ROUTINE HEALING: ICD-10-CM

## 2017-06-29 DIAGNOSIS — Z98.42 H/O BILATERAL CATARACT EXTRACTION: ICD-10-CM

## 2017-06-29 DIAGNOSIS — Z78.0 POSTMENOPAUSAL ESTROGEN DEFICIENCY: ICD-10-CM

## 2017-06-29 DIAGNOSIS — E78.5 HYPERLIPIDEMIA LDL GOAL <100: ICD-10-CM

## 2017-06-29 PROBLEM — R73.9 HYPERGLYCEMIA: Status: RESOLVED | Noted: 2017-05-30 | Resolved: 2017-06-29

## 2017-06-29 PROBLEM — D64.9 ANEMIA: Status: RESOLVED | Noted: 2017-05-30 | Resolved: 2017-06-29

## 2017-06-29 PROCEDURE — 99215 OFFICE O/P EST HI 40 MIN: CPT | Performed by: FAMILY MEDICINE

## 2017-06-29 RX ORDER — IBUPROFEN 200 MG
200 TABLET ORAL EVERY 6 HOURS PRN
COMMUNITY
End: 2017-09-19

## 2017-06-29 NOTE — MR AVS SNAPSHOT
"        Char Rockwell   2017 9:40 AM   Office Visit   MRN: [id number]    Department:  [place] [address]   Dept Phone:  [phone]    Description:  Female : 3/1/1930   Provider:  Doug Lewis M.D.           Reason for Visit     Establish Care fall (memorail day weekend)     Hip Injury (L) hip      Allergies as of 2017     No Known Allergies      You were diagnosed with     Closed displaced intertrochanteric fracture of left femur with routine healing   [764805]       Postmenopausal estrogen deficiency   [521959]       Osteoporosis, unspecified osteoporosis type, unspecified pathological fracture presence   [9767842]       Hyperlipidemia LDL goal <100   [919213]       Elevated blood pressure, situational   [897206]       H/O bilateral cataract extraction   [971613]       Prediabetes   [258426]       Systolic murmur   [833801]         Vital Signs     Blood Pressure Pulse Temperature Height Weight Body Mass Index    156/64 mmHg 77 37.3 °C (99.1 °F) 1.638 m (5' 4.49\") 57.063 kg (125 lb 12.8 oz) 21.27 kg/m2    Oxygen Saturation Smoking Status                94% Never Smoker           Basic Information     Date Of Birth Sex Race Ethnicity Preferred Language    3/1/1930 Female White Non- English      Your appointments     Aug 01, 2017 10:00 AM   Established Patient with Doug Lewis M.D.   [facility])    [address]  [address]  [facility] [id number] [phone]           You will be receiving a confirmation call a few days before your appointment from our automated call confirmation system.              Problem List              ICD-10-CM Priority Class Noted - Resolved    Osteoporosis M81.0   2016 - Present    Elevated blood pressure, situational R03.0   2016 - Present    Preventative health care Z00.00   2016 - Present    Hyperlipidemia LDL goal <100 E78.5   2016 - Present    H/O bilateral cataract " extraction Z98.41, Z98.42   8/24/2016 - Present    Closed displaced intertrochanteric fracture of left femur with routine healing S72.142D High  5/29/2017 - Present    Hypotension I95.9   5/30/2017 - Present    Postmenopausal estrogen deficiency Z78.0   6/29/2017 - Present    Prediabetes R73.03   6/29/2017 - Present    Systolic murmur R01.1   6/29/2017 - Present      Health Maintenance        Date Due Completion Dates    PAP SMEAR 3/1/1951 ---    MAMMOGRAM 3/1/1970 ---    COLONOSCOPY 3/1/1980 ---    BONE DENSITY 3/1/1995 ---    IMM DTaP/Tdap/Td Vaccine (1 - Tdap) 6/20/2007 6/19/2007, 2/26/1997, 7/26/1995            Current Immunizations     13-VALENT PCV PREVNAR 3/28/2016    Hepatitis A Vaccine, Adult 5/13/1998, 8/27/1997    Hepatitis B Vaccine Recombivax (Adol/Adult) 9/10/1997, 4/9/1997, 3/5/1997    Influenza TIV (IM) 9/29/2015, 10/3/2014, 9/21/2012, 10/19/2011, 9/2/2010, 10/11/2004    Pneumococcal polysaccharide vaccine (PPSV-23) 10/20/1997    SHINGLES VACCINE 9/23/2016    TD Vaccine 6/19/2007, 2/26/1997, 7/26/1995      Below and/or attached are the medications your provider expects you to take. Review all of your home medications and newly ordered medications with your provider and/or pharmacist. Follow medication instructions as directed by your provider and/or pharmacist. Please keep your medication list with you and share with your provider. Update the information when medications are discontinued, doses are changed, or new medications (including over-the-counter products) are added; and carry medication information at all times in the event of emergency situations     Allergies:  No Known Allergies          Medications  Valid as of: June 29, 2017 - 10:52 AM    Generic Name Brand Name Tablet Size Instructions for use    Acetaminophen (Tab) TYLENOL 325 MG Take 2 Tabs by mouth every 6 hours.        Aspirin (Tablet Delayed Response) ECOTRIN 81 MG Take 1 Tab by mouth 2 times a day, with meals for 42 days.         Ibuprofen (Tab) MOTRIN 200 MG Take 200 mg by mouth every 6 hours as needed.        Metoprolol Tartrate (Tab) LOPRESSOR 25 MG Take 0.5 Tabs by mouth 2 Times a Day.        Sennosides-Docusate Sodium (Tab) PERICOLACE or SENOKOT S 8.6-50 MG Take 2 Tabs by mouth every day.        TraMADol HCl (Tab) ULTRAM 50 MG Take 1-2 Tabs by mouth every 6 hours as needed for Moderate Pain or Severe Pain.        .                 Medicines prescribed today were sent to:     [id number] - [place], [place] - [address]    [address] [place] [address]    Phone: [phone] Fax: [phone]    Open 24 Hours?: No      Medication refill instructions:       If your prescription bottle indicates you have medication refills left, it is not necessary to call your provider’s office. Please contact your pharmacy and they will refill your medication.    If your prescription bottle indicates you do not have any refills left, you may request refills at any time through one of the following ways: The online [employer] system (except Urgent Care), by calling your provider’s office, or by asking your pharmacy to contact your provider’s office with a refill request. Medication refills are processed only during regular business hours and may not be available until the next business day. Your provider may request additional information or to have a follow-up visit with you prior to refilling your medication.   *Please Note: Medication refills are assigned a new Rx number when refilled electronically. Your pharmacy may indicate that no refills were authorized even though a new prescription for the same medication is available at the pharmacy. Please request the medicine by name with the pharmacy before contacting your provider for a refill.        Your To Do List     Future Labs/Procedures Complete By Expires    DS-BONE DENSITY STUDY (DEXA)  As directed 6/29/2018    LIPID PROFILE  As directed 6/29/2018    Comments:    Fasting at least 8hours  "     Referral     A referral request has been sent to our patient care coordination department. Please allow 3-5 business days for us to process this request and contact you either by phone or mail. If you do not hear from us by the 5th business day, please call us at [phone].        Instructions    Please bring in the records from a doctor's office or a receipt of a Tdap (tetanus, diphtheria, and pertussis) vaccination within the last 10 years.    Dr. Lewis's tips for \"Lifestyle Medicine:\"     Check out the talk/documentary on \"How not to die\" by Dr. Dereje Lobato (on his website nutritionfacts.org, he also authored a book with this title).       1) Make SMART lifestyle changes: Specific, Measurable, Attainable, Relevant, Time-sensitive.  The lifestyle changes that you need to make are with regards to: nutrition, cardiovascular exercise, sleep, stress management.  Make these changes every 2 weeks, revisiting the previous goals and perhaps revising them and/or setting new ones.       2) Nutrition: Make as many changes as you can to increase the amount of whole-foods (not Whole Foods, necessarily!  ;-)), plant-based diet as possible:   A) Books: Eat to Live (Dr. Jd Jackman), The Spectrum (Dr. Fletcher Harrison), The Starch Solution (Dr. Casey Aceves)      B) Documentaries (can usually be found on [employer]): [place] Over Knives.  Fat, Sick, and Nearly Dead.  Fed Up.           3) Cardiovascular Exercise: The center for disease control recommends a minimum of 150 minutes per week of moderate intensity cardiovascular exercise for weight maintenance and cardiovascular health.  Set this as your initial goal, with at least 30 minutes per session. Types of exercise can include 30 minutes of elliptical, 30 minutes of decently fast jog, 30 minutes of swimming, 30 minutes of heavy gardening (lifting big bags of fertilizer, digging deep holes/ditches).  He can cut down the minute requirements to half, by doing higher intensity " sports such as a game of tennis, or soccer.  He notes the library and check out with they have for home exercise programs, as well.       4) Sleep:    A) Goal: Obtain a minimum of 7-8hours of continuous, uninterrupted, restful sleep per night.    B) Tips for Sleep Hygiene:    I) Go to bed and wake up at consistent times whether work/school day or not.     II) Keep room dark, quiet, and comfortable.  Increase exposure to sunlight during awake times and avoid bright lights (especially anything with a backlight) at least the last 1-2hours before going to sleep.     III) Don't nap.     IV) Avoid stimulant or caffeine use more than 4 hours after wake time.        5) Stress Management: You cannot change the stresses of life dizziness necessarily, but you can change how he responds of them. One good way to manage stress is to write things down in order to help you process how to approach things in general or specifically. Another good way is to talk it out with someone you trusts, specifically your significant other or good friend. A definite great way to deal with stress is to have cardiovascular exercise!          Maira Status: Patient Declined

## 2017-06-29 NOTE — PATIENT INSTRUCTIONS
"Please bring in the records from a doctor's office or a receipt of a Tdap (tetanus, diphtheria, and pertussis) vaccination within the last 10 years.    Dr. Lewis's tips for \"Lifestyle Medicine:\"     Check out the talk/documentary on \"How not to die\" by Dr. Dereje Lobato (on his website [email or web address], he also authored a book with this title).       1) Make SMART lifestyle changes: Specific, Measurable, Attainable, Relevant, Time-sensitive.  The lifestyle changes that you need to make are with regards to: nutrition, cardiovascular exercise, sleep, stress management.  Make these changes every 2 weeks, revisiting the previous goals and perhaps revising them and/or setting new ones.       2) Nutrition: Make as many changes as you can to increase the amount of whole-foods (not Whole Foods, necessarily!  ;-)), plant-based diet as possible:   A) Books: Eat to Live (Dr. Jd Jackman), The Spectrum (Dr. Fletcher Harrison), The Starch Solution (Dr. Casey Aceves)      B) Documentaries (can usually be found on [employer]): [place] Over Knives.  Fat, Sick, and Nearly Dead.  Fed Up.           3) Cardiovascular Exercise: The center for disease control recommends a minimum of 150 minutes per week of moderate intensity cardiovascular exercise for weight maintenance and cardiovascular health.  Set this as your initial goal, with at least 30 minutes per session. Types of exercise can include 30 minutes of elliptical, 30 minutes of decently fast jog, 30 minutes of swimming, 30 minutes of heavy gardening (lifting big bags of fertilizer, digging deep holes/ditches).  He can cut down the minute requirements to half, by doing higher intensity sports such as a game of tennis, or soccer.  He notes the library and check out with they have for home exercise programs, as well.       4) Sleep:    A) Goal: Obtain a minimum of 7-8hours of continuous, uninterrupted, restful sleep per night.    B) Tips for Sleep Hygiene:    I) Go to bed and wake up at " consistent times whether work/school day or not.     II) Keep room dark, quiet, and comfortable.  Increase exposure to sunlight during awake times and avoid bright lights (especially anything with a backlight) at least the last 1-2hours before going to sleep.     III) Don't nap.     IV) Avoid stimulant or caffeine use more than 4 hours after wake time.        5) Stress Management: You cannot change the stresses of life dizziness necessarily, but you can change how he responds of them. One good way to manage stress is to write things down in order to help you process how to approach things in general or specifically. Another good way is to talk it out with someone you trusts, specifically your significant other or good friend. A definite great way to deal with stress is to have cardiovascular exercise!

## 2017-07-03 NOTE — ASSESSMENT & PLAN NOTE
Review of recent operative notes and imaging reveals that patient had recent history of closed displaced intertrochanteric fracture with pinning.  Patient states that she is ambulatory with use of device when needed.  She continues under the care of Dr. Pimentel.    Patient with known history of osteoporosis, is post-menopausal, but hasn't had DEXA scan in at least 3 years (believes last DEXA scan was in 2009).  Patient reports consuming a largely plant-based diet, and hasn't had any other fractures that she can recall.  Patient is not taking any vitamin D or calcium supplementation, doesn't want to take bisphosphonates, and is not on HRT.    ROS is NEGATIVE for BLE radicular pain, saddle paresthesias, bowel or bladder incontinence, gait instability, LLE increased edema/pallor/paresthesias/poikilothermia/paralysis.    ROS is NEGATIVE for respiratory depression, cognitive slowing, constipation, cyanotic skin changes.

## 2017-07-08 NOTE — ASSESSMENT & PLAN NOTE
Patient has A1c of 5.7%, not taking any medication for diabetes, is currently asymptomatic.    ROS is NEGATIVE for blurred vision, polydipsia, polyuria, diaphoresis, palpitations, fatigue, irritability, flank pain, BLE paresthesias.

## 2017-07-08 NOTE — ASSESSMENT & PLAN NOTE
Patient noted to have elevated blood pressure today in clinic, currently asymptomatic, not taking any blood pressure medication, eats a largely plant-based diet.  Patient believes her BP may elevate 2/2 whitecoat hypertension.    ROS is NEGATIVE for dizziness, generalized weakness/fatigue, vision/hearing changes, jaw pain/paresthesias, BUE pain/paresthesias/numbness/weakness, chest pain/pressure, palpitations, dyspnea, RUQ abdominal pain, oliguria/anuria, BLE edema.

## 2017-07-08 NOTE — ASSESSMENT & PLAN NOTE
Please see notes from same date of service 06/29/17 re: Closed displaced intertronchanteric fracture of left femur with routine healing.

## 2017-07-08 NOTE — ASSESSMENT & PLAN NOTE
Patient with history of HLD, not currently taking statins, any other cholesterol medication, nor taking red yeast rice.  Patient reports eating a largely plant-based diet.    [facility] records reviewed, no Lipi profile on file.    ROS is NEGATIVE for dizziness, generalized weakness/fatigue, vision/hearing changes, jaw pain/paresthesias, BUE pain/paresthesias/numbness/weakness, chest pain/pressure, palpitations, dyspnea, RUQ abdominal pain, oliguria/anuria, BLE edema.

## 2017-07-09 NOTE — PROGRESS NOTES
Subjective:     Chief Complaint   Patient presents with   • Establish Care     fall (memorail day weekend)    • Hip Injury     (L) hip       History of Present Illness:  Char Rockwell is a 87 y.o. female established patient who presents today to establish primary medical care with me:    Closed displaced intertrochanteric fracture of left femur with routine healing  Review of recent operative notes and imaging reveals that patient had recent history of closed displaced intertrochanteric fracture with pinning.  Patient states that she is ambulatory with use of device when needed.  She continues under the care of Dr. Pimentel.    Patient with known history of osteoporosis, is post-menopausal, but hasn't had DEXA scan in at least 3 years (believes last DEXA scan was in 2009).  Patient reports consuming a largely plant-based diet, and hasn't had any other fractures that she can recall.  Patient is not taking any vitamin D or calcium supplementation, doesn't want to take bisphosphonates, and is not on HRT.    ROS is NEGATIVE for BLE radicular pain, saddle paresthesias, bowel or bladder incontinence, gait instability, LLE increased edema/pallor/paresthesias/poikilothermia/paralysis.    ROS is NEGATIVE for respiratory depression, cognitive slowing, constipation, cyanotic skin changes.    Osteoporosis  Please see notes from same date of service 06/29/17 re: Closed displaced intertronchanteric fracture of left femur with routine healing.    Postmenopausal estrogen deficiency  Please see notes from same date of service 06/29/17 re: Closed displaced intertronchanteric fracture of left femur with routine healing.    Hyperlipidemia LDL goal <100  Patient with history of HLD, not currently taking statins, any other cholesterol medication, nor taking red yeast rice.  Patient reports eating a largely plant-based diet.    [facility] records reviewed, no Lipi profile on file.    ROS is NEGATIVE for dizziness, generalized  weakness/fatigue, vision/hearing changes, jaw pain/paresthesias, BUE pain/paresthesias/numbness/weakness, chest pain/pressure, palpitations, dyspnea, RUQ abdominal pain, oliguria/anuria, BLE edema.    Prediabetes  Patient has A1c of 5.7%, not taking any medication for diabetes, is currently asymptomatic.    ROS is NEGATIVE for blurred vision, polydipsia, polyuria, diaphoresis, palpitations, fatigue, irritability, flank pain, BLE paresthesias.    Elevated blood pressure, situational  Patient noted to have elevated blood pressure today in clinic, currently asymptomatic, not taking any blood pressure medication, eats a largely plant-based diet.  Patient believes her BP may elevate 2/2 whitecoat hypertension.    ROS is NEGATIVE for dizziness, generalized weakness/fatigue, vision/hearing changes, jaw pain/paresthesias, BUE pain/paresthesias/numbness/weakness, chest pain/pressure, palpitations, dyspnea, RUQ abdominal pain, oliguria/anuria, BLE edema.    Systolic murmur  No s/sx either at rest or with exertion.    ROS is NEGATIVE for dizziness, generalized weakness/fatigue, vision/hearing changes, jaw pain/paresthesias, BUE pain/paresthesias/numbness/weakness, chest pain/pressure, palpitations, dyspnea, RUQ abdominal pain, oliguria/anuria, BLE edema.        Patient Active Problem List    Diagnosis Date Noted   • Closed displaced intertrochanteric fracture of left femur with routine healing 05/29/2017     Priority: High   • Postmenopausal estrogen deficiency 06/29/2017   • Prediabetes 06/29/2017   • Systolic murmur 06/29/2017   • Hypotension 05/30/2017   • Osteoporosis 08/24/2016   • Elevated blood pressure, situational 08/24/2016   • Preventative health care 08/24/2016   • Hyperlipidemia LDL goal <100 08/24/2016   • H/O bilateral cataract extraction 08/24/2016       Additional History:   Allergies:    Review of patient's allergies indicates no known allergies.     Current Medications:     Current Outpatient Prescriptions  "  Medication Sig Dispense Refill   • ibuprofen (MOTRIN) 200 MG Tab Take 200 mg by mouth every 6 hours as needed.     • aspirin EC (ECOTRIN) 81 MG Tablet Delayed Response Take 1 Tab by mouth 2 times a day, with meals for 42 days. 84 Tab 0   • acetaminophen (TYLENOL) 325 MG Tab Take 2 Tabs by mouth every 6 hours. 30 Tab 0   • senna-docusate (PERICOLACE OR SENOKOT S) 8.6-50 MG Tab Take 2 Tabs by mouth every day.     • metoprolol (LOPRESSOR) 25 MG Tab Take 0.5 Tabs by mouth 2 Times a Day. 60 Tab    • tramadol (ULTRAM) 50 MG Tab Take 1-2 Tabs by mouth every 6 hours as needed for Moderate Pain or Severe Pain. 60 Tab 1     No current facility-administered medications for this visit.        Social History:     Social History   Substance Use Topics   • Smoking status: Never Smoker    • Smokeless tobacco: Never Used   • Alcohol Use: No       ROS:     - NOTE: All other systems reviewed and are negative, except as in HPI.     Objective:   Physical Exam:    Vitals: Blood pressure 156/64, pulse 77, temperature 37.3 °C (99.1 °F), height 1.638 m (5' 4.49\"), weight 57.063 kg (125 lb 12.8 oz), SpO2 94 %.   BMI: Body mass index is 21.27 kg/(m^2).   General/Constitutional: Vitals as above, Well nourished, well developed female in no acute distress   Head/Eyes: Head is grossly normal & atraumatic, bilateral conjunctivae clear and not injected, bilateral EOMI, bilateral PERRL   ENT: Bilateral external ears grossly normal in appearance, Hearing grossly intact, External nares normal in appearance and without discharge/bleeding   Respiratory: No respiratory distress, bilateral lungs are clear to ausculation in all lung fields (anterior/lateral/posterior), no wheezing/rhonchi/rales   Cardiovascular: Regular rate and rhythm with holosystolic murmur grade 2 to 3 of 6 in aortic band otherwise without gallops/rubs, distal pulses are intact and equal bilaterally (radial, posterior tibial), no bilateral lower extremity edema   MSK: Gait grossly " normal & not antalgic   Integumentary: No apparent rashes   Psych: Judgment grossly appropriate, no apparent depression/anxiety    Health Maintenance:      - Patient believes that pap smears were negative, mammovrams were negative, and that colonoscopy (within the last 10 years before age of 74yo) was negative.     - I have requested previous records, and will update accordingly.    Imaging/Labs:      - Reviewed and interpreted as in HPI above    Assessment and Plan:   1. Closed displaced intertrochanteric fracture of left femur with routine healing  2. Osteoporosis, unspecified osteoporosis type, unspecified pathological fracture presence  3. Postmenopausal estrogen deficiency  Uncontrolled, in healing stage.  Patient to continue care with Dr. Pimentel.     A) Evaluation: Evaluate possibly underlying etiology (patient has h/o osteoporosis) with DEXA Scan.        - DS-BONE DENSITY STUDY (DEXA); Future   B) Management:     - I discussed with patient general recommendation to at least have Vitamin D + Calcium supplementation, also that she can achieve sufficient levels of Calcium via plant-based diet, and that Vitamin D should be supplemented at 2,000 to 5,000 IU per day in light of previous diagnosis.    - REFERRAL TO [facility] (HIP) Services Requested:: General-HIP Staff to Evaluate [facility], Registered Dietitian for Medical Nutrition Therapy; Reason for Visit:: Change in Treatment Regimen, Medical Condition Requiring Nutr    4. Hyperlipidemia LDL goal <100  Unknown control.  Lipid Profile to evaluate.   - LIPID PROFILE; Future    5. Prediabetes  6. Elevated blood pressure, situational  Uncontrolled.  Patient wants to pursue lifestyle changes.     - Patient and I discussed the importance of lifestyle changes, with particular emphasis on plant-based nutrition (for the purposes of weight loss, general health, HLD, Prediabetes, Elevated BP), as well as cardiovascular exercise, proper sleep,  and stress management.  This discussion is briefly summarized in the patient instruction section below.  Patient verbalized understanding.   - REFERRAL TO [facility] (HIP) Services Requested:: General-[facility] Staff to Evaluate [facility], Registered Dietitian for Medical Nutrition Therapy; Reason for Visit:: Change in Treatment Regimen, Medical Condition Requiring Nutr    7. Systolic murmur  Asymptomatic.  Patient to inform me if this gets symptomatic.    8. H/O bilateral cataract extraction  Stable, well-controlled.  Listed for historical purposes.    NOTE: A total of 40minutes was spent in direct face-to-face time with the patient, of which over 50% of the time was spent in counseling and/or coordination of care, the contents of which are described in this note.    RTC: in 1 month for labs follow-up, review of health records.    PLEASE NOTE: This dictation was created using voice recognition software. I have made every reasonable attempt to correct obvious errors, but I expect that there are errors of grammar and possibly content that I did not discover before finalizing the note.

## 2017-07-10 NOTE — ASSESSMENT & PLAN NOTE
No s/sx either at rest or with exertion.    ROS is NEGATIVE for dizziness, generalized weakness/fatigue, vision/hearing changes, jaw pain/paresthesias, BUE pain/paresthesias/numbness/weakness, chest pain/pressure, palpitations, dyspnea, RUQ abdominal pain, oliguria/anuria, BLE edema.

## 2017-07-11 ENCOUNTER — HOSPITAL ENCOUNTER (OUTPATIENT)
Dept: LAB | Facility: MEDICAL CENTER | Age: 82
End: 2017-07-11
Attending: FAMILY MEDICINE
Payer: MEDICARE

## 2017-07-11 DIAGNOSIS — E78.5 HYPERLIPIDEMIA LDL GOAL <100: ICD-10-CM

## 2017-07-11 DIAGNOSIS — R03.0 ELEVATED BLOOD PRESSURE, SITUATIONAL: ICD-10-CM

## 2017-07-11 LAB
CHOLEST SERPL-MCNC: 236 MG/DL (ref 100–199)
HDLC SERPL-MCNC: 57 MG/DL
LDLC SERPL CALC-MCNC: 149 MG/DL
TRIGL SERPL-MCNC: 148 MG/DL (ref 0–149)

## 2017-07-11 PROCEDURE — 80061 LIPID PANEL: CPT

## 2017-07-11 PROCEDURE — 36415 COLL VENOUS BLD VENIPUNCTURE: CPT

## 2017-07-12 ENCOUNTER — HOSPITAL ENCOUNTER (OUTPATIENT)
Dept: RADIOLOGY | Facility: MEDICAL CENTER | Age: 82
End: 2017-07-12
Attending: FAMILY MEDICINE
Payer: MEDICARE

## 2017-07-12 DIAGNOSIS — S72.142D CLOSED DISPLACED INTERTROCHANTERIC FRACTURE OF LEFT FEMUR WITH ROUTINE HEALING: ICD-10-CM

## 2017-07-12 DIAGNOSIS — Z78.0 POSTMENOPAUSAL ESTROGEN DEFICIENCY: ICD-10-CM

## 2017-07-12 DIAGNOSIS — M81.0 OSTEOPOROSIS, UNSPECIFIED OSTEOPOROSIS TYPE, UNSPECIFIED PATHOLOGICAL FRACTURE PRESENCE: ICD-10-CM

## 2017-07-12 PROCEDURE — 77080 DXA BONE DENSITY AXIAL: CPT

## 2017-07-13 ENCOUNTER — TELEPHONE (OUTPATIENT)
Dept: MEDICAL GROUP | Facility: MEDICAL CENTER | Age: 82
End: 2017-07-13

## 2017-07-13 ENCOUNTER — OFFICE VISIT (OUTPATIENT)
Dept: MEDICAL GROUP | Facility: MEDICAL CENTER | Age: 82
End: 2017-07-13
Payer: MEDICARE

## 2017-07-13 VITALS
WEIGHT: 125 LBS | HEART RATE: 79 BPM | OXYGEN SATURATION: 95 % | SYSTOLIC BLOOD PRESSURE: 160 MMHG | DIASTOLIC BLOOD PRESSURE: 77 MMHG | HEIGHT: 64 IN | BODY MASS INDEX: 21.34 KG/M2 | RESPIRATION RATE: 18 BRPM | TEMPERATURE: 98.2 F

## 2017-07-13 DIAGNOSIS — I15.9 SECONDARY HYPERTENSION: ICD-10-CM

## 2017-07-13 PROBLEM — I10 HTN (HYPERTENSION): Status: ACTIVE | Noted: 2017-07-13

## 2017-07-13 PROCEDURE — 99214 OFFICE O/P EST MOD 30 MIN: CPT | Performed by: PHYSICIAN ASSISTANT

## 2017-07-13 RX ORDER — LISINOPRIL 10 MG/1
10 TABLET ORAL DAILY
Qty: 30 TAB | Refills: 2 | Status: SHIPPED | OUTPATIENT
Start: 2017-07-13 | End: 2017-10-26 | Stop reason: SDUPTHER

## 2017-07-13 NOTE — TELEPHONE ENCOUNTER
Phone Number Called: [phone]     Message: Left message for patient to call back.     Left Message for patient to call back: yes

## 2017-07-13 NOTE — TELEPHONE ENCOUNTER
Phone Number Called: [phone]     Message: Left message for patient to call back.     Left Message for patient to call back: yes

## 2017-07-13 NOTE — TELEPHONE ENCOUNTER
----- Message from Doug Lewis M.D. sent at 7/12/2017 10:18 PM PDT -----  MA to call patient to relate the following:     - Patient has Osteoporosis, reaffirming previous diagnosis.  Patient and I need to discuss next step at our next clnic visit.       - If patient would like to discuss them in further detail, we can discuss this at clinic visit on 08/01/17@10am.

## 2017-07-13 NOTE — ASSESSMENT & PLAN NOTE
Patient states noted for years to be elevated. Patient was given a medication and she monitor blood pressure. It was noted the patient's blood pressure had gone down. Patient states that she discontinued taking medication. Patient states she has not been on medication for some time. Patient states it was recently noted elevated blood pressure status post left hip surgery. Patient states while in rehabilitation patient was noted to have fluctuating elevated blood pressure. Patient states blood pressure would be 130 - 170 given time. Patient states that while being discharged from rehabilitation she is given prescription for metoprolol 25 mg one half by mouth twice a day. Patient admits that she did not take medication upon discharge. Patient states that home health nurse recently has been noting blood pressures have been elevated. Patient states that she did take metoprolol tartrate 12.5 mg last night and this morning. Currently states asymptomatic and denies chest pain, shortness of breath, jaw pain, arm pain, nausea, vomiting.    Patient states that she does have past medical history of mitral valve regurgitation which has been worked up in the past. Patient has been told that cardiology has informed patient not to worry about mitral valve regurgitation.

## 2017-07-13 NOTE — PROGRESS NOTES
Chief Complaint   Patient presents with   • Hypertension       HISTORY OF PRESENT ILLNESS: Patient is a 87 y.o. female established patient who presents today to discuss hypertension    HTN (hypertension)  Patient states noted for years to be elevated. Patient was given a medication and she monitor blood pressure. It was noted the patient's blood pressure had gone down. Patient states that she discontinued taking medication. Patient states she has not been on medication for some time. Patient states it was recently noted elevated blood pressure status post left hip surgery. Patient states while in rehabilitation patient was noted to have fluctuating elevated blood pressure. Patient states blood pressure would be 130 - 170 given time. Patient states that while being discharged from rehabilitation she is given prescription for metoprolol 25 mg one half by mouth twice a day. Patient admits that she did not take medication upon discharge. Patient states that home health nurse recently has been noting blood pressures have been elevated. Patient states that she did take metoprolol tartrate 12.5 mg last night and this morning. Currently states asymptomatic and denies chest pain, shortness of breath, jaw pain, arm pain, nausea, vomiting.    Patient states that she does have past medical history of mitral valve regurgitation which has been worked up in the past. Patient has been told that cardiology has informed patient not to worry about mitral valve regurgitation.           Patient Active Problem List    Diagnosis Date Noted   • Closed displaced intertrochanteric fracture of left femur with routine healing 05/29/2017     Priority: High   • HTN (hypertension) 07/13/2017   • Postmenopausal estrogen deficiency 06/29/2017   • Prediabetes 06/29/2017   • Systolic murmur 06/29/2017   • Hypotension 05/30/2017   • Osteoporosis 08/24/2016   • Elevated blood pressure, situational 08/24/2016   • Preventative health care 08/24/2016   •  Hyperlipidemia LDL goal <100 2016   • H/O bilateral cataract extraction 2016       Allergies:Review of patient's allergies indicates no known allergies.    Current Outpatient Prescriptions   Medication Sig Dispense Refill   • lisinopril (PRINIVIL) 10 MG Tab Take 1 Tab by mouth every day. 30 Tab 2   • ibuprofen (MOTRIN) 200 MG Tab Take 200 mg by mouth every 6 hours as needed.     • acetaminophen (TYLENOL) 325 MG Tab Take 2 Tabs by mouth every 6 hours. 30 Tab 0   • senna-docusate (PERICOLACE OR SENOKOT S) 8.6-50 MG Tab Take 2 Tabs by mouth every day.     • metoprolol (LOPRESSOR) 25 MG Tab Take 0.5 Tabs by mouth 2 Times a Day. 60 Tab    • tramadol (ULTRAM) 50 MG Tab Take 1-2 Tabs by mouth every 6 hours as needed for Moderate Pain or Severe Pain. 60 Tab 1   • aspirin EC (ECOTRIN) 81 MG Tablet Delayed Response Take 1 Tab by mouth 2 times a day, with meals for 42 days. 84 Tab 0     No current facility-administered medications for this visit.       Social History   Substance Use Topics   • Smoking status: Never Smoker    • Smokeless tobacco: Never Used   • Alcohol Use: No       Family Status   Relation Status Death Age   • Mother     • Father     • Daughter Alive      Family History   Problem Relation Age of Onset   • Family history unknown: Yes       Review of Systems:   Constitutional: Negative for fever, chills, weight loss and malaise/fatigue.   HENT: Negative for ear pain, nosebleeds, congestion, sore throat and neck pain.    Eyes: Negative for blurred vision.   Respiratory: Negative for cough, sputum production, shortness of breath and wheezing.    Cardiovascular: Negative for chest pain, palpitations, orthopnea and leg swelling.   Gastrointestinal: Negative for heartburn, nausea, vomiting and abdominal pain.   Genitourinary: Negative for dysuria, urgency and frequency.   Musculoskeletal: Negative for myalgias, back pain and joint pain.   Skin: Negative for rash and itching.  "  Neurological: Negative for dizziness, tingling, tremors, sensory change, focal weakness and headaches.   Endo/Heme/Allergies: Does not bruise/bleed easily.   Psychiatric/Behavioral: Negative for depression, suicidal ideas and memory loss.  The patient is not nervous/anxious and does not have insomnia.    All other systems reviewed and are negative except as in HPI.    Exam:  Blood pressure 160/77, pulse 79, temperature 36.8 °C (98.2 °F), resp. rate 18, height 1.638 m (5' 4.49\"), weight 56.7 kg (125 lb), SpO2 95 %, not currently breastfeeding.  General:  Well nourished, well developed female in NAD  Head: is grossly normal.  Neck: Supple without JVD or bruit. Thyroid is not enlarged.  Pulmonary: Clear to ausculation. Normal effort. No rales, ronchi, or wheezing.  Cardiovascular: Regular rate and rhythm with pansystolic or holosystolic murmur most audible over the aortic valve. Carotid and radial pulses are intact and equal bilaterally.  Extremities: no clubbing, cyanosis, or edema.    Medical decision-making and discussion: A 70-year-old female presents to clinic with chronic history of hypertension. Patient was discharged through the rehabilitation with metoprolol 12.5 mg twice a day. Patient's blood pressure 160/77 with a heart rate of 79. I'm hesitating increasing patient's beta blocker as it may cause her to go bradycardic and therefore make her a risk for falling. June 2, 2017 I have reviewed patient's basic metabolic panel and is noted kidney function to be normal. Therefore I would like to place patient on lisinopril 10 mg daily basis with reevaluation in one week. Patient is not taking in caffeine, no salt, does not smoke and denies drug use. In discussion with the patient we are going to add 10 mg lisinopril to her 12.5 mg of metoprolol twice a day. She will follow-up with her primary care for attenuation.    Please note that this dictation was created using voice recognition software. I have made every " reasonable attempt to correct obvious errors, but I expect that there are errors of grammar and possibly content that I did not discover before finalizing the note.    Assessment/Plan:  1. Secondary hypertension  lisinopril (PRINIVIL) 10 MG Tab

## 2017-07-13 NOTE — MR AVS SNAPSHOT
"Char Rockwell   2017 1:20 PM   Office Visit   MRN: [id number]    Department:  [place] [address]   Dept Phone:  [phone]    Description:  Female : 3/1/1930   Provider:  Trent Rodriguez PA-C           Reason for Visit     Hypertension           Allergies as of 2017     No Known Allergies      You were diagnosed with     Secondary hypertension   [0852640]         Vital Signs     Blood Pressure Pulse Temperature Respirations Height Weight    160/77 mmHg 79 36.8 °C (98.2 °F) 18 1.638 m (5' 4.49\") 56.7 kg (125 lb)    Body Mass Index Oxygen Saturation Breastfeeding? Smoking Status          21.13 kg/m2 95% No Never Smoker         Basic Information     Date Of Birth Sex Race Ethnicity Preferred Language    3/1/1930 Female White Non- English      Your appointments     Aug 01, 2017 10:00 AM   Established Patient with Doug Lewis M.D.   [facility])    [address]  [address]  [place] [address] [phone]           You will be receiving a confirmation call a few days before your appointment from our automated call confirmation system.            Aug 18, 2017 10:30 AM   New Patient with Joy Hope RD   [facility])    [address]  [address]  [place] [address]   [phone]           It is the patient's responsibility to check with your Insurance for benefit coverage for visit / visits.  24 hours notice is required for all appointment changes or cancellation.  Please arrive 20 min. before your appointment time  Please bring the following with you: 1)Picture Id 2) Insurance card 3) Completed Forms if New Patient  If scheduled for DIABETES VISIT please also brin) Medications 2) Meter 3) Blood glucose logs 4) Any recent labs if you have them  If scheduled for NUTRITION VISIT please also brin) 2-3 days of detailed food intake logs 2) Blood glucose monitor and blood glucose logs " (if you have them)              Problem List              ICD-10-CM Priority Class Noted - Resolved    Osteoporosis M81.0   8/24/2016 - Present    Elevated blood pressure, situational R03.0   8/24/2016 - Present    Preventative health care Z00.00   8/24/2016 - Present    Hyperlipidemia LDL goal <100 E78.5   8/24/2016 - Present    H/O bilateral cataract extraction Z98.41, Z98.42   8/24/2016 - Present    Closed displaced intertrochanteric fracture of left femur with routine healing S72.142D High  5/29/2017 - Present    Hypotension I95.9   5/30/2017 - Present    Postmenopausal estrogen deficiency Z78.0   6/29/2017 - Present    Prediabetes R73.03   6/29/2017 - Present    Systolic murmur R01.1   6/29/2017 - Present    HTN (hypertension) I10   7/13/2017 - Present      Health Maintenance        Date Due Completion Dates    PAP SMEAR 3/1/1951 ---    MAMMOGRAM 3/1/1970 ---    COLONOSCOPY 3/1/1980 ---    IMM DTaP/Tdap/Td Vaccine (1 - Tdap) 6/20/2007 6/19/2007, 2/26/1997, 7/26/1995    IMM INFLUENZA (1) 9/1/2017 9/29/2015, 10/3/2014, 9/21/2012, 10/19/2011, 9/2/2010, 10/11/2004    BONE DENSITY 7/12/2022 7/12/2017            Current Immunizations     13-VALENT PCV PREVNAR 3/28/2016    Hepatitis A Vaccine, Adult 5/13/1998, 8/27/1997    Hepatitis B Vaccine Recombivax (Adol/Adult) 9/10/1997, 4/9/1997, 3/5/1997    Influenza TIV (IM) 9/29/2015, 10/3/2014, 9/21/2012, 10/19/2011, 9/2/2010, 10/11/2004    Pneumococcal polysaccharide vaccine (PPSV-23) 10/20/1997    SHINGLES VACCINE 9/23/2016    TD Vaccine 6/19/2007, 2/26/1997, 7/26/1995      Below and/or attached are the medications your provider expects you to take. Review all of your home medications and newly ordered medications with your provider and/or pharmacist. Follow medication instructions as directed by your provider and/or pharmacist. Please keep your medication list with you and share with your provider. Update the information when medications are discontinued, doses are  changed, or new medications (including over-the-counter products) are added; and carry medication information at all times in the event of emergency situations     Allergies:  No Known Allergies          Medications  Valid as of: July 13, 2017 -  1:51 PM    Generic Name Brand Name Tablet Size Instructions for use    Acetaminophen (Tab) TYLENOL 325 MG Take 2 Tabs by mouth every 6 hours.        Aspirin (Tablet Delayed Response) ECOTRIN 81 MG Take 1 Tab by mouth 2 times a day, with meals for 42 days.        Ibuprofen (Tab) MOTRIN 200 MG Take 200 mg by mouth every 6 hours as needed.        Lisinopril (Tab) PRINIVIL 10 MG Take 1 Tab by mouth every day.        Metoprolol Tartrate (Tab) LOPRESSOR 25 MG Take 0.5 Tabs by mouth 2 Times a Day.        Sennosides-Docusate Sodium (Tab) PERICOLACE or SENOKOT S 8.6-50 MG Take 2 Tabs by mouth every day.        TraMADol HCl (Tab) ULTRAM 50 MG Take 1-2 Tabs by mouth every 6 hours as needed for Moderate Pain or Severe Pain.        .                 Medicines prescribed today were sent to:     [facility]/PHARMACY #[id number] - [place], [place] - [address]    [address] [place] [address]    Phone: [phone] Fax: [phone]    Open 24 Hours?: No      Medication refill instructions:       If your prescription bottle indicates you have medication refills left, it is not necessary to call your provider’s office. Please contact your pharmacy and they will refill your medication.    If your prescription bottle indicates you do not have any refills left, you may request refills at any time through one of the following ways: The online [employer] system (except Urgent Care), by calling your provider’s office, or by asking your pharmacy to contact your provider’s office with a refill request. Medication refills are processed only during regular business hours and may not be available until the next business day. Your provider may request additional information or to have a follow-up visit with  you prior to refilling your medication.   *Please Note: Medication refills are assigned a new Rx number when refilled electronically. Your pharmacy may indicate that no refills were authorized even though a new prescription for the same medication is available at the pharmacy. Please request the medicine by name with the pharmacy before contacting your provider for a refill.           MyChart Status: Patient Declined

## 2017-07-13 NOTE — TELEPHONE ENCOUNTER
----- Message from Doug Lewis M.D. sent at 7/12/2017 10:24 PM PDT -----  MA to call patient to relate the following:     - Elevated total and bad cholesterol (LDL)       - If patient would like to discuss them in further detail, we can discuss this at clinic visit on 08/01/17@10am.

## 2017-07-31 ENCOUNTER — TELEPHONE (OUTPATIENT)
Dept: MEDICAL GROUP | Facility: MEDICAL CENTER | Age: 82
End: 2017-07-31

## 2017-07-31 NOTE — TELEPHONE ENCOUNTER
ESTABLISHED PATIENT PRE-VISIT PLANNING     Note: Patient will not be contacted if there is no indication to call.     1.  Reviewed notes from the last few office visits within the medical group: Yes 06/29/2017    2.  If any orders were placed at last visit or intended to be done for this visit (i.e. 6 mos follow-up), do we have Results/Consult Notes?        •  Labs - Labs were not ordered at last office visit.       •  Imaging - Imaging was not ordered at last office visit.       •  Referrals - Referral To [facility].    3. Is this appointment scheduled as a Hospital Follow-Up? No    4.  Immunizations were updated in Epic using WebIZ?: No WebIZ record       •  Web Iz Recommendations:     5.  Patient is due for the following Health Maintenance Topics:   Health Maintenance Due   Topic Date Due   • Annual Wellness Visit  03/01/1930   • MAMMOGRAM  03/01/1970   • COLONOSCOPY  03/01/1980   • IMM DTaP/Tdap/Td Vaccine (1 - Tdap) 06/20/2007     Due to patient age i am not sure if these topics are appropriate please discuss with PCP.      6.  Patient was NOT informed to arrive 15 min prior to their scheduled appointment and bring in their medication bottles.

## 2017-08-01 ENCOUNTER — OFFICE VISIT (OUTPATIENT)
Dept: MEDICAL GROUP | Facility: MEDICAL CENTER | Age: 82
End: 2017-08-01
Payer: MEDICARE

## 2017-08-01 VITALS
WEIGHT: 126 LBS | HEART RATE: 72 BPM | DIASTOLIC BLOOD PRESSURE: 66 MMHG | HEIGHT: 64 IN | TEMPERATURE: 99.1 F | SYSTOLIC BLOOD PRESSURE: 148 MMHG | BODY MASS INDEX: 21.51 KG/M2 | OXYGEN SATURATION: 97 %

## 2017-08-01 DIAGNOSIS — M81.0 OSTEOPOROSIS, UNSPECIFIED OSTEOPOROSIS TYPE, UNSPECIFIED PATHOLOGICAL FRACTURE PRESENCE: ICD-10-CM

## 2017-08-01 DIAGNOSIS — E78.5 HYPERLIPIDEMIA LDL GOAL <100: ICD-10-CM

## 2017-08-01 PROCEDURE — 99214 OFFICE O/P EST MOD 30 MIN: CPT | Performed by: FAMILY MEDICINE

## 2017-08-01 RX ORDER — CHOLECALCIFEROL (VITAMIN D3) 125 MCG
1 CAPSULE ORAL DAILY
Qty: 90 TAB | Refills: 1 | Status: SHIPPED | OUTPATIENT
Start: 2017-08-01 | End: 2018-01-28

## 2017-08-01 RX ORDER — CALCIUM CARBONATE 500(1250)
500 TABLET ORAL 2 TIMES DAILY WITH MEALS
Qty: 90 TAB | COMMUNITY
Start: 2017-08-01 | End: 2017-09-19

## 2017-08-01 ASSESSMENT — PATIENT HEALTH QUESTIONNAIRE - PHQ9: CLINICAL INTERPRETATION OF PHQ2 SCORE: 0

## 2017-08-01 NOTE — MR AVS SNAPSHOT
"        Char Rockwell   2017 10:00 AM   Office Visit   MRN: [id number]    Department:  [facility]   Dept Phone:  [phone]    Description:  Female : 3/1/1930   Provider:  Doug Lewis M.D.           Reason for Visit     Results labs      Allergies as of 2017     No Known Allergies      You were diagnosed with     Hyperlipidemia LDL goal <100   [[id number]]       Osteoporosis, unspecified osteoporosis type, unspecified pathological fracture presence   [[id number]]         Vital Signs     Blood Pressure Pulse Temperature Height Weight Body Mass Index    148/66 mmHg 72 37.3 °C (99.1 °F) 1.638 m (5' 4.49\") 57.153 kg (126 lb) 21.30 kg/m2    Oxygen Saturation Breastfeeding? Smoking Status             97% No Never Smoker          Basic Information     Date Of Birth Sex Race Ethnicity Preferred Language    3/1/1930 Female White Non- English      Your appointments     Aug 18, 2017 10:30 AM   New Patient with Joy Hope RD   [facility])    [address]  [address]  [place] [id number] [phone]           It is the patient's responsibility to check with your Insurance for benefit coverage for visit / visits.  24 hours notice is required for all appointment changes or cancellation.  Please arrive 20 min. before your appointment time  Please bring the following with you: 1)Picture Id 2) Insurance card 3) Completed Forms if New Patient  If scheduled for DIABETES VISIT please also brin) Medications 2) Meter 3) Blood glucose logs 4) Any recent labs if you have them  If scheduled for NUTRITION VISIT please also brin) 2-3 days of detailed food intake logs 2) Blood glucose monitor and blood glucose logs (if you have them)            2017 10:00 AM   Established Patient with Doug Lewis M.D.   [facility])    [address]  [address]  [place] [address] [phone]           You will " be receiving a confirmation call a few days before your appointment from our automated call confirmation system.              Problem List              ICD-10-CM Priority Class Noted - Resolved    Osteoporosis M81.0   8/24/2016 - Present    Elevated blood pressure, situational R03.0   8/24/2016 - Present    Preventative health care Z00.00   8/24/2016 - Present    Hyperlipidemia LDL goal <100 E78.5   8/24/2016 - Present    H/O bilateral cataract extraction Z98.41, Z98.42   8/24/2016 - Present    Closed displaced intertrochanteric fracture of left femur with routine healing S72.142D High  5/29/2017 - Present    Hypotension I95.9   5/30/2017 - Present    Postmenopausal estrogen deficiency Z78.0   6/29/2017 - Present    Prediabetes R73.03   6/29/2017 - Present    Systolic murmur R01.1   6/29/2017 - Present    HTN (hypertension) I10   7/13/2017 - Present      Health Maintenance        Date Due Completion Dates    IMM DTaP/Tdap/Td Vaccine (1 - Tdap) 6/20/2007 6/19/2007, 2/26/1997, 7/26/1995    IMM INFLUENZA (1) 9/1/2017 9/29/2015, 10/3/2014, 9/21/2012, 10/19/2011, 9/2/2010, 10/11/2004    BONE DENSITY 7/12/2022 7/12/2017            Current Immunizations     13-VALENT PCV PREVNAR 3/28/2016    Hepatitis A Vaccine, Adult 5/13/1998, 8/27/1997    Hepatitis B Vaccine Recombivax (Adol/Adult) 9/10/1997, 4/9/1997, 3/5/1997    Influenza TIV (IM) 9/29/2015, 10/3/2014, 9/21/2012, 10/19/2011, 9/2/2010, 10/11/2004    Pneumococcal polysaccharide vaccine (PPSV-23) 10/20/1997    SHINGLES VACCINE 9/23/2016    TD Vaccine 6/19/2007, 2/26/1997, 7/26/1995      Below and/or attached are the medications your provider expects you to take. Review all of your home medications and newly ordered medications with your provider and/or pharmacist. Follow medication instructions as directed by your provider and/or pharmacist. Please keep your medication list with you and share with your provider. Update the information when medications are discontinued,  doses are changed, or new medications (including over-the-counter products) are added; and carry medication information at all times in the event of emergency situations     Allergies:  No Known Allergies          Medications  Valid as of: August 01, 2017 - 10:41 AM    Generic Name Brand Name Tablet Size Instructions for use    Acetaminophen (Tab) TYLENOL 325 MG Take 2 Tabs by mouth every 6 hours.        Calcium (Tab) OS-GENNARO 500 500 MG Take 1 Tab by mouth 2 times a day, with meals.        Cholecalciferol (Tab) Vitamin D3 2000 UNITS Take 1 tablet by mouth every day for 180 days.        Ibuprofen (Tab) MOTRIN 200 MG Take 200 mg by mouth every 6 hours as needed.        Lisinopril (Tab) PRINIVIL 10 MG Take 1 Tab by mouth every day.        Metoprolol Tartrate (Tab) LOPRESSOR 25 MG Take 0.5 Tabs by mouth 2 Times a Day.        Sennosides-Docusate Sodium (Tab) PERICOLACE or SENOKOT S 8.6-50 MG Take 2 Tabs by mouth every day.        TraMADol HCl (Tab) ULTRAM 50 MG Take 1-2 Tabs by mouth every 6 hours as needed for Moderate Pain or Severe Pain.        .                 Medicines prescribed today were sent to:     [facility]/PHARMACY #[id number] - [place], [place] - [address]    [address] [place] [address]    Phone: [phone] Fax: [phone]    Open 24 Hours?: No      Medication refill instructions:       If your prescription bottle indicates you have medication refills left, it is not necessary to call your provider’s office. Please contact your pharmacy and they will refill your medication.    If your prescription bottle indicates you do not have any refills left, you may request refills at any time through one of the following ways: The online [employer] system (except Urgent Care), by calling your provider’s office, or by asking your pharmacy to contact your provider’s office with a refill request. Medication refills are processed only during regular business hours and may not be available until the next business day.  Your provider may request additional information or to have a follow-up visit with you prior to refilling your medication.   *Please Note: Medication refills are assigned a new Rx number when refilled electronically. Your pharmacy may indicate that no refills were authorized even though a new prescription for the same medication is available at the pharmacy. Please request the medicine by name with the pharmacy before contacting your provider for a refill.        Your To Do List     Future Labs/Procedures Complete By Expires    LIPID PROFILE  11/1/2017 8/1/2018    Comments:    Fasting at least 8hours    VITAMIN D,25 HYDROXY  As directed 8/1/2018         MyChart Status: Patient Declined

## 2017-08-01 NOTE — ASSESSMENT & PLAN NOTE
Patient and I discussed her most recent DEXA scan from 07/12/17 which is positive for Osteoporosis.  Patient and I discussed that she can take a bisphosphonate, and is also advised to have vitamin D + Calcium supplementation as well as weight-bearing exercise.      ROS is NEGATIVE for arthralgias, bone pains, myalgias,

## 2017-08-01 NOTE — ASSESSMENT & PLAN NOTE
Patient and I discussed recent labs (see below) and that 10year ASCVD risk is not calculable due to age, but that it is likely elevatee based on most recent lipid panel (see below), current blood pressure (hypertensive, with medication), diabetes status (non-diabetic), and smoking status (non-smoker).    Patient and I then discussed necessary dietary changes to make to address dyslipidemia.  Patient verbalized understanding.    ROS is NEGATIVE for dizziness, generalized weakness/fatigue, vision/hearing changes, jaw pain/paresthesias, BUE pain/paresthesias/numbness/weakness, chest pain/pressure, palpitations, dyspnea, RUQ abdominal pain, oliguria/anuria, BLE edema.    Lab Results   Component Value Date/Time    CHOLESTEROL,* 07/11/2017 09:51 AM    * 07/11/2017 09:51 AM    HDL 57 07/11/2017 09:51 AM    TRIGLYCERIDES 148 07/11/2017 09:51 AM

## 2017-08-03 NOTE — PROGRESS NOTES
Subjective:     Chief Complaint   Patient presents with   • Results     labs       History of Present Illness:  Char Rockwell is a 87 y.o. female established patient who presents today to follow-up on labs as well as DEXA scan, and management of HLD and Osteoporosis.:    Hyperlipidemia LDL goal <100  Patient and I discussed recent labs (see below) and that 10year ASCVD risk is not calculable due to age, but that it is likely elevatee based on most recent lipid panel (see below), current blood pressure (hypertensive, with medication), diabetes status (non-diabetic), and smoking status (non-smoker).    Patient and I then discussed necessary dietary changes to make to address dyslipidemia.  Patient verbalized understanding.    ROS is NEGATIVE for dizziness, generalized weakness/fatigue, vision/hearing changes, jaw pain/paresthesias, BUE pain/paresthesias/numbness/weakness, chest pain/pressure, palpitations, dyspnea, RUQ abdominal pain, oliguria/anuria, BLE edema.    Lab Results   Component Value Date/Time    CHOLESTEROL,* 07/11/2017 09:51 AM    * 07/11/2017 09:51 AM    HDL 57 07/11/2017 09:51 AM    TRIGLYCERIDES 148 07/11/2017 09:51 AM       Osteoporosis  Patient and I discussed her most recent DEXA scan from 07/12/17 which is positive for Osteoporosis.  Patient and I discussed that she can take a bisphosphonate, and is also advised to have vitamin D + Calcium supplementation as well as weight-bearing exercise.      ROS is NEGATIVE for arthralgias, bone pains, myalgias,       Patient Active Problem List    Diagnosis Date Noted   • Closed displaced intertrochanteric fracture of left femur with routine healing 05/29/2017     Priority: High   • HTN (hypertension) 07/13/2017   • Postmenopausal estrogen deficiency 06/29/2017   • Prediabetes 06/29/2017   • Systolic murmur 06/29/2017   • Hypotension 05/30/2017   • Osteoporosis 08/24/2016   • Elevated blood pressure, situational 08/24/2016   •  "[facility] health care 08/24/2016   • Hyperlipidemia LDL goal <100 08/24/2016   • H/O bilateral cataract extraction 08/24/2016       Additional History:   Allergies:    Review of patient's allergies indicates no known allergies.     Current Medications:     Current Outpatient Prescriptions   Medication Sig Dispense Refill   • calcium carbonate (OS-GENNARO 500) 500 MG Tab Take 1 Tab by mouth 2 times a day, with meals. 90 Tab    • Cholecalciferol (VITAMIN D3) 2000 UNITS Tab Take 1 tablet by mouth every day for 180 days. 90 Tab 1   • lisinopril (PRINIVIL) 10 MG Tab Take 1 Tab by mouth every day. 30 Tab 2   • metoprolol (LOPRESSOR) 25 MG Tab Take 0.5 Tabs by mouth 2 Times a Day. 60 Tab    • ibuprofen (MOTRIN) 200 MG Tab Take 200 mg by mouth every 6 hours as needed.     • acetaminophen (TYLENOL) 325 MG Tab Take 2 Tabs by mouth every 6 hours. 30 Tab 0   • senna-docusate (PERICOLACE OR SENOKOT S) 8.6-50 MG Tab Take 2 Tabs by mouth every day.     • tramadol (ULTRAM) 50 MG Tab Take 1-2 Tabs by mouth every 6 hours as needed for Moderate Pain or Severe Pain. 60 Tab 1     No current facility-administered medications for this visit.        Social History:     Social History   Substance Use Topics   • Smoking status: Never Smoker    • Smokeless tobacco: Never Used   • Alcohol Use: No       ROS:     - NOTE: All other systems reviewed and are negative, except as in HPI.     Objective:   Physical Exam:    Vitals: Blood pressure 148/66, pulse 72, temperature 37.3 °C (99.1 °F), height 1.638 m (5' 4.49\"), weight 57.153 kg (126 lb), SpO2 97 %, not currently breastfeeding.   BMI: Body mass index is 21.3 kg/(m^2).   General/Constitutional: Vitals as above, Well nourished, well developed female in no acute distress   Head/Eyes: Head is grossly normal & atraumatic, bilateral conjunctivae clear and not injected, bilateral EOMI, bilateral PERRL   ENT: Bilateral external ears grossly normal in appearance, Hearing grossly intact, External nares " normal in appearance and without discharge/bleeding   Respiratory: No respiratory distress, bilateral lungs are clear to ausculation in all lung fields (anterior/lateral/posterior), no wheezing/rhonchi/rales   Cardiovascular: Regular rate and rhythm without murmur/gallops/rubs, distal pulses are intact and equal bilaterally (radial, posterior tibial), no bilateral lower extremity edema   MSK: Gait grossly normal & not antalgic   Integumentary: No apparent rashes   Psych: Judgment grossly appropriate, no apparent depression/anxiety    Health Maintenance:      - Patient would like to review her records at home for Tdap    Imaging/Labs:      - 07/12/17 -- DEXA = Osteoporosis     - 07/12/17 -- TChol 236, , HDL 57,  --> Pure hypercholesterolemia    Assessment and Plan:   1. Hyperlipidemia LDL goal <100  Uncontrolled.     A) Education/Management: Patient and I discussed the importance of lifestyle changes, with particular emphasis on plant-based nutrition (for the purposes of weight loss, general health, HLD), as well as cardiovascular exercise, proper sleep, and stress management.  This discussion is briefly summarized in the patient instruction section below.  Patient verbalized understanding.   B) Evaluation:     - LIPID PROFILE; Future    2. Osteoporosis, unspecified osteoporosis type, unspecified pathological fracture presence  Uncontrolled.  Patient declines bisphosphonate medication.  Patient advised to pursue Vit D + Calcium supplementation.  Will check Vit D levels for baseline.   - calcium carbonate (OS-GENNARO 500) 500 MG Tab; Take 1 Tab by mouth 2 times a day, with meals.  Dispense: 90 Tab   - VITAMIN D,25 HYDROXY; Future   - Cholecalciferol (VITAMIN D3) 2000 UNITS Tab; Take 1 tablet by mouth every day for 180 days.  Dispense: 90 Tab; Refill: 1      RTC: in 3months for f/v re: Lipi Profile + Vit D, lifestyle changes.    PLEASE NOTE: This dictation was created using voice recognition software. I have  made every reasonable attempt to correct obvious errors, but I expect that there are errors of grammar and possibly content that I did not discover before finalizing the note.

## 2017-08-18 ENCOUNTER — NON-PROVIDER VISIT (OUTPATIENT)
Dept: HEALTH INFORMATION MANAGEMENT | Facility: MEDICAL CENTER | Age: 82
End: 2017-08-18
Payer: MEDICARE

## 2017-08-18 VITALS — BODY MASS INDEX: 20.83 KG/M2 | WEIGHT: 125 LBS | HEIGHT: 65 IN

## 2017-08-18 DIAGNOSIS — R73.03 PRE-DIABETES: ICD-10-CM

## 2017-08-18 DIAGNOSIS — M81.0 OSTEOPOROSIS, UNSPECIFIED OSTEOPOROSIS TYPE, UNSPECIFIED PATHOLOGICAL FRACTURE PRESENCE: ICD-10-CM

## 2017-08-18 DIAGNOSIS — S72.142D CLOSED DISPLACED INTERTROCHANTERIC FRACTURE OF LEFT FEMUR WITH ROUTINE HEALING: ICD-10-CM

## 2017-08-18 PROCEDURE — 97802 MEDICAL NUTRITION INDIV IN: CPT | Performed by: DIETITIAN, REGISTERED

## 2017-08-18 NOTE — PROGRESS NOTES
"8/18/2017 87 y.o.   Referring Provider: Doug Lewis M.D.       Time in/out: 10:30-11:35am (same appt with )     Anthropometrics/Objective  Filed Vitals:    08/18/17 1219   Height: 1.638 m (5' 4.5\")   Weight: 56.7 kg (125 lb)       Body mass index is 21.13 kg/(m^2).    Stated Goal Weight: current weight   Estimated Daily Caloric needs 2802-1726  Kcal 25-30kcal/kg   See comprehensive patient history form for further information     Subjective:  Pt is here today with her  to discuss adequate nutrition. They have questions regarding the quality of her diet and specific questions about certain foods to eat and not to eat.   Char has cooked a mediterranean diet for she and her  for many years. They eat 3 meals a day. Lunch is the main meal and dinner is light. They eat red meat about 1 per month or less. Char hadnt had red meat for >40 years up until a few months ago when she started craving it.   They eat fish 1-2 times a week or more. Use olive oil to cook, and very little butter.   Char avoids milk and soy which she does not tolerate. Does use some almond milk in oatmeal.   They have dessert only a couple times a year. And then treat themselves to a small fig cookie but just one.   They drink nothing but water and 1-2 cups coffee black.   They have been very active with exercise consistently for many years. However lately have been less active bc Char had a hip fx 2 months ago, and also due to the smoke and heat. Vitaliy plans to start walking 3 miles in addition to the treadmill he has been doing, starting next week.   Vitaliy asks about beet juice and its health benefits.   They do not take ca+ or vitamin D supplements , however Char started vitamin D after her hip fx   Char states her cholesterol has been elevated since she was 30. Has been stable over the past 50 years, although high.     Nutrition Diagnosis (PES Statement)  Altered nutrition related lab values related to diet " vs genetics as evidenced by lipid panel      Client history:  Condition(s) associated with a diagnosis or treatment (specify) Osteoporosis, HLD, high blood pressure, pre-diabetes     Biochemical data, medical test and procedures  Lab Results   Component Value Date/Time    GLYCOHEMOGLOBIN 5.7* 05/30/2017 07:08 PM   @  Lab Results   Component Value Date/Time    GLUCOSE - ACCU-* 05/30/2017 05:41 PM     Lab Results   Component Value Date/Time    CHOLESTEROL,* 07/11/2017 09:51 AM    * 07/11/2017 09:51 AM    HDL 57 07/11/2017 09:51 AM    TRIGLYCERIDES 148 07/11/2017 09:51 AM         Nutrition Intervention       Meal and Snack  Recommend a general/healthful diet     Comprehensive Nutrition education Instruction or training leading to in-depth nutrition related knowledge about:  Combine carb, protein and fat at each meal, Meal timing and spacing, Menu Planning, Metabolism of carb, protein, fat, Physical activity/exercise, Portion control, Sweets and alcohol in moderation, Heart-healthy guidelines and Handouts provided regarding topics discussed    Monitoring & Evaluation Plan    Behavioral-Environmental:  Physical activity : resume exercise as tolerated once medically cleared after physical therapy.     Food / Nutrient Intake:  Fluid/Beverage intake : Drink 1 cup almond milk per day for additional calcium, or consider a calcium citrate supplement with vitamin D. 2000 IU vitamin D/day and have PCP check 25-OH vitamin D next lab draw.   Food intake: Choose whole grains, low fat dairy, lean meats and continue to eat fruits and vegetable daily. Limit red meat to 1 once per month or less. Include protein at each meal; plant or lean animal sources. Fill half plate with vegetables at lunch and dinner. Choose plant based fats over animal fats. Do not add salt to foods. Limit dessert to 1 small serving as per current regimen.          Assessment Notes:   Philip and Vitaliy overall eat a healthy, nutrient rich diet.  They eat fruits and vegetables daily and follow a mostly Mediterranean diet which is advisable. They have been less active lately due to injuries and weather but are motivated to resume exercise once those barriers resolve. They have a good nutrition knowledge base overall. Recommended that they increase their protein intake and to include at each meal. Also Char should increase calcium intake or start a supplement given osteoporosis and recent fracture. They will limit their saturated fat consumption to help with cholesterol levels and heart health. I told Vitaliy it would be ok to drink 4oz beet juice daily for circulation and BP if desired. They asked many good questions and we covered many different nutrition topics.     Follow up MIC Hope, RD, LD, CDE  [phone]

## 2017-08-18 NOTE — MR AVS SNAPSHOT
Char Rockwell   2017 10:30 AM   Appointment   MRN: [id number]    Department:  Health [facility]   Dept Phone:  [phone]    Description:  Female : 3/1/1930   Provider:  Joy Hope RD           Allergies as of 2017     No Known Allergies      Vital Signs     Smoking Status                   Never Smoker            Basic Information     Date Of Birth Sex Race Ethnicity Preferred Language    3/1/1930 Female White Non- English      Your appointments     2017 10:00 AM   Established Patient with Doug Lewis M.D.   [facility]    [address]  [address]  [place] [address] [phone]           You will be receiving a confirmation call a few days before your appointment from our automated call confirmation system.              Problem List              ICD-10-CM Priority Class Noted - Resolved    Osteoporosis M81.0   2016 - Present    Elevated blood pressure, situational R03.0   2016 - Present    Preventative health care Z00.00   2016 - Present    Hyperlipidemia LDL goal <100 E78.5   2016 - Present    H/O bilateral cataract extraction Z98.41, Z98.42   2016 - Present    Closed displaced intertrochanteric fracture of left femur with routine healing S72.142D High  2017 - Present    Hypotension I95.9   2017 - Present    Postmenopausal estrogen deficiency Z78.0   2017 - Present    Prediabetes R73.03   2017 - Present    Systolic murmur R01.1   2017 - Present    HTN (hypertension) I10   2017 - Present      Health Maintenance        Date Due Completion Dates    IMM DTaP/Tdap/Td Vaccine (1 - Tdap) 2007, 1997, 1995    IMM INFLUENZA (1) 2017, 10/3/2014, 2012, 10/19/2011, 2010, 10/11/2004    BONE DENSITY 2022            Current Immunizations     13-VALENT PCV PREVNAR 3/28/2016    Hepatitis A Vaccine, Adult  5/13/1998, 8/27/1997    Hepatitis B Vaccine Recombivax (Adol/Adult) 9/10/1997, 4/9/1997, 3/5/1997    Influenza TIV (IM) 9/29/2015, 10/3/2014, 9/21/2012, 10/19/2011, 9/2/2010, 10/11/2004    Pneumococcal polysaccharide vaccine (PPSV-23) 10/20/1997    SHINGLES VACCINE 9/23/2016    TD Vaccine 6/19/2007, 2/26/1997, 7/26/1995      Below and/or attached are the medications your provider expects you to take. Review all of your home medications and newly ordered medications with your provider and/or pharmacist. Follow medication instructions as directed by your provider and/or pharmacist. Please keep your medication list with you and share with your provider. Update the information when medications are discontinued, doses are changed, or new medications (including over-the-counter products) are added; and carry medication information at all times in the event of emergency situations     Allergies:  No Known Allergies          Medications  Valid as of: August 18, 2017 - 12:12 PM    Generic Name Brand Name Tablet Size Instructions for use    Acetaminophen (Tab) TYLENOL 325 MG Take 2 Tabs by mouth every 6 hours.        Calcium (Tab) OS-GENNARO 500 500 MG Take 1 Tab by mouth 2 times a day, with meals.        Cholecalciferol (Tab) Vitamin D3 2000 units Take 1 tablet by mouth every day for 180 days.        Ibuprofen (Tab) MOTRIN 200 MG Take 200 mg by mouth every 6 hours as needed.        Lisinopril (Tab) PRINIVIL 10 MG Take 1 Tab by mouth every day.        Metoprolol Tartrate (Tab) LOPRESSOR 25 MG Take 0.5 Tabs by mouth 2 Times a Day.        Sennosides-Docusate Sodium (Tab) PERICOLACE or SENOKOT S 8.6-50 MG Take 2 Tabs by mouth every day.        TraMADol HCl (Tab) ULTRAM 50 MG Take 1-2 Tabs by mouth every 6 hours as needed for Moderate Pain or Severe Pain.        .                 Medicines prescribed today were sent to:     [facility][id number] - [place], [place] - [address]    [address] [place] [address]    Phone:  [phone] Fax: [phone]    Open 24 Hours?: No      Medication refill instructions:       If your prescription bottle indicates you have medication refills left, it is not necessary to call your provider’s office. Please contact your pharmacy and they will refill your medication.    If your prescription bottle indicates you do not have any refills left, you may request refills at any time through one of the following ways: The online [facility] system (except Urgent Care), by calling your provider’s office, or by asking your pharmacy to contact your provider’s office with a refill request. Medication refills are processed only during regular business hours and may not be available until the next business day. Your provider may request additional information or to have a follow-up visit with you prior to refilling your medication.   *Please Note: Medication refills are assigned a new Rx number when refilled electronically. Your pharmacy may indicate that no refills were authorized even though a new prescription for the same medication is available at the pharmacy. Please request the medicine by name with the pharmacy before contacting your provider for a refill.           [employer] Status: Patient Declined

## 2017-09-19 ENCOUNTER — OFFICE VISIT (OUTPATIENT)
Dept: MEDICAL GROUP | Facility: MEDICAL CENTER | Age: 82
End: 2017-09-19
Payer: MEDICARE

## 2017-09-19 VITALS
SYSTOLIC BLOOD PRESSURE: 160 MMHG | TEMPERATURE: 98.9 F | RESPIRATION RATE: 16 BRPM | BODY MASS INDEX: 21.27 KG/M2 | WEIGHT: 127.65 LBS | DIASTOLIC BLOOD PRESSURE: 80 MMHG | HEIGHT: 65 IN | OXYGEN SATURATION: 94 % | HEART RATE: 75 BPM

## 2017-09-19 DIAGNOSIS — R03.0 ELEVATED BLOOD PRESSURE, SITUATIONAL: ICD-10-CM

## 2017-09-19 DIAGNOSIS — M81.0 OSTEOPOROSIS, UNSPECIFIED OSTEOPOROSIS TYPE, UNSPECIFIED PATHOLOGICAL FRACTURE PRESENCE: ICD-10-CM

## 2017-09-19 PROBLEM — I95.9 HYPOTENSION: Status: RESOLVED | Noted: 2017-05-30 | Resolved: 2017-09-19

## 2017-09-19 PROCEDURE — 99214 OFFICE O/P EST MOD 30 MIN: CPT | Performed by: FAMILY MEDICINE

## 2017-09-19 NOTE — ASSESSMENT & PLAN NOTE
Patient has brought her blood pressure report, which she has been recording since last visit in August, from August 10 until September 5, 2017, patient was taking her blood pressure medications as follows: Lopressor 12.5 mg by mouth twice a day as well as lisinopril 10 mg by mouth daily at bedtime.     From August 10-16, off meds, patient's blood pressures are running around 130s over 75-80. From August 17 to September 5, on medications, blood pressure is running approximately one teens to low 120s over high 60s to low 70s. From September 10 until now, off medications, patient's blood pressures have been high 120s to low 130s over mid to high 70s.     Patient states that she did have potential side effect of mouth drying at night only, states that she drinks a sufficient quantity of water daily. Reviewed that she can keep a glass of water at bedside, therefore between that as well as making sure that sounds or air-conditioning is not pointed out her head, she should be able to keep her oral mucosa well hydrated as well.    ROS is NEGATIVE for dizziness, generalized weakness/fatigue, vision/hearing changes, jaw pain/paresthesias, BUE pain/paresthesias/numbness/weakness, chest pain/pressure, palpitations, dyspnea, RUQ abdominal pain, oliguria/anuria, BLE edema.

## 2017-09-19 NOTE — ASSESSMENT & PLAN NOTE
Patient would like consultation regarding whether she should take bisphosphonates or not. This was suggested to her by Dr. Pimentel, her orthopedic surgeon.  Patient interviewed that her T score on most recent DEXA scan was -2.1 in both her back as well as hip. Therefore, patient should at least be on vitamin D and calcium supplementation, if not also on a bisphosphonate. We discussed the risk factor of bisphosphonate long-term, but can increase risk of osteonecrosis of the jaw.     Additionally, patient describes her exercise and socially seems, which is very physically active, doing, patient exercises such as Pilates, tino chi, Qi Gong, however her ambulation and weightbearing exercise limited due to pain in the, hip, or left low back.    Patient states that she has completed physical therapy with [facility]. Patient continues to do leg exercises as instructed by her former physical therapist.    Lastly, patient does eat a wide variety of leafy green vegetables as well as [place], therefore her dietary calcium and magnesium intake should be more than sufficient. She is also continuing to take her vitamin D3 2000 units by mouth daily.

## 2017-09-25 NOTE — TELEPHONE ENCOUNTER
Was the patient seen in the last year in this department? Yes     Does patient have an active prescription for medications requested? No     Received Request Via: Pharmacy     Last Visit:9/19/17    Last Labs: 7/1/17

## 2017-09-29 NOTE — PROGRESS NOTES
Subjective:   Chief Complaint/History of Present Illness:  Char Rockwell is a 87 y.o. female established patient who presents today toDiscuss management of osteoporosis as well as elevated blood pressure:    Osteoporosis  Patient would like consultation regarding whether she should take bisphosphonates or not. This was suggested to her by Dr. Pimentel, her orthopedic surgeon.  Patient interviewed that her T score on most recent DEXA scan was -2.1 in both her back as well as hip. Therefore, patient should at least be on vitamin D and calcium supplementation, if not also on a bisphosphonate. We discussed the risk factor of bisphosphonate long-term, but can increase risk of osteonecrosis of the jaw.     Additionally, patient describes her exercise and socially seems, which is very physically active, doing, patient exercises such as Pilates, tino chi, Qi Gong, however her ambulation and weightbearing exercise limited due to pain in the, hip, or left low back.    Patient states that she has completed physical therapy with [facility]. Patient continues to do leg exercises as instructed by her former physical therapist.    Lastly, patient does eat a wide variety of leafy green vegetables as well as Brie, therefore her dietary calcium and magnesium intake should be more than sufficient. She is also continuing to take her vitamin D3 2000 units by mouth daily.    Elevated blood pressure, situational  Patient has brought her blood pressure report, which she has been recording since last visit in August, from August 10 until September 5, 2017, patient was taking her blood pressure medications as follows: Lopressor 12.5 mg by mouth twice a day as well as lisinopril 10 mg by mouth daily at bedtime.     From August 10-16, off meds, patient's blood pressures are running around 130s over 75-80. From August 17 to September 5, on medications, blood pressure is running approximately one teens to low 120s  over high 60s to low 70s. From September 10 until now, off medications, patient's blood pressures have been high 120s to low 130s over mid to high 70s.     Patient states that she did have potential side effect of mouth drying at night only, states that she drinks a sufficient quantity of water daily. Reviewed that she can keep a glass of water at bedside, therefore between that as well as making sure that sounds or air-conditioning is not pointed out her head, she should be able to keep her oral mucosa well hydrated as well.    ROS is NEGATIVE for dizziness, generalized weakness/fatigue, vision/hearing changes, jaw pain/paresthesias, BUE pain/paresthesias/numbness/weakness, chest pain/pressure, palpitations, dyspnea, RUQ abdominal pain, oliguria/anuria, BLE edema.      Patient Active Problem List    Diagnosis Date Noted   • Closed displaced intertrochanteric fracture of left femur with routine healing 05/29/2017     Priority: High   • HTN (hypertension) 07/13/2017   • Postmenopausal estrogen deficiency 06/29/2017   • Prediabetes 06/29/2017   • Systolic murmur 06/29/2017   • Osteoporosis 08/24/2016   • Elevated blood pressure, situational 08/24/2016   • Preventative health care 08/24/2016   • Hyperlipidemia LDL goal <100 08/24/2016   • H/O bilateral cataract extraction 08/24/2016       Additional History:   Allergies:    Soy allergy     Current Medications:     Current Outpatient Prescriptions   Medication Sig Dispense Refill   • Cholecalciferol (VITAMIN D3) 2000 UNITS Tab Take 1 tablet by mouth every day for 180 days. 90 Tab 1   • metoprolol (LOPRESSOR) 25 MG Tab TAKE 1/2 TABLET (12.5 MG) BY MOUTH TWICE A DAY 90 Tab 1   • lisinopril (PRINIVIL) 10 MG Tab Take 1 Tab by mouth every day. 30 Tab 2   • acetaminophen (TYLENOL) 325 MG Tab Take 2 Tabs by mouth every 6 hours. 30 Tab 0   • tramadol (ULTRAM) 50 MG Tab Take 1-2 Tabs by mouth every 6 hours as needed for Moderate Pain or Severe Pain. 60 Tab 1     No current  "facility-administered medications for this visit.         Social History:     Social History   Substance Use Topics   • Smoking status: Never Smoker   • Smokeless tobacco: Never Used   • Alcohol use No       ROS:     - NOTE: All other systems reviewed and are negative, except as in HPI.     Objective:   Physical Exam:    Vitals: Blood pressure 160/80, pulse 75, temperature 37.2 °C (98.9 °F), resp. rate 16, height 1.638 m (5' 4.5\"), weight 57.9 kg (127 lb 10.3 oz), SpO2 94 %.   BMI: Body mass index is 21.57 kg/m².   General/Constitutional: Vitals as above, Well nourished, well developed female in no acute distress   Head/Eyes: Head is grossly normal & atraumatic, bilateral conjunctivae clear and not injected, bilateral EOMI, bilateral PERRL   ENT: Bilateral external ears grossly normal in appearance, Hearing grossly intact, External nares normal in appearance and without discharge/bleeding   Respiratory: No respiratory distress, bilateral lungs are clear to ausculation in all lung fields (anterior/lateral/posterior), no wheezing/rhonchi/rales   Cardiovascular: Regular rate and rhythm without murmur/gallops/rubs, distal pulses are intact and equal bilaterally (radial, posterior tibial), no bilateral lower extremity edema   MSK: Gait grossly normal & not antalgic   Integumentary: No apparent rashes   Psych: Judgment grossly appropriate, no apparent depression/anxiety    Health Maintenance:      - Declines flu vaccine    Imaging/Labs:      - Review of DEXA scan from June 2017 reveals the patient does indeed have osteoporosis, but this is relatively unchanged from previous T-scores.    Assessment and Plan:   1. Osteoporosis, unspecified osteoporosis type, unspecified pathological fracture presence  Uncontrolled, patient declines to initiate bisphosphonate therapy after careful discussion of risks, benefits, and alternatives to bisphosphonate therapy. Patient will continue with vitamin D and calcium supplementation.    2. " Elevated blood pressure, situational  Uncontrolled, however seems to be secondary to white coat hypertension. Patient to continue with interval checks of her blood pressures at home. Patient given ER precautions regarding malignant hypertension versus hypertensive emergency. Patient verbalizes understanding.      RTC: As needed.    PLEASE NOTE: This dictation was created using voice recognition software. I have made every reasonable attempt to correct obvious errors, but I expect that there are errors of grammar and possibly content that I did not discover before finalizing the note.

## 2017-10-26 DIAGNOSIS — I15.9 SECONDARY HYPERTENSION: ICD-10-CM

## 2017-10-26 RX ORDER — LISINOPRIL 10 MG/1
10 TABLET ORAL DAILY
Qty: 90 TAB | Refills: 1 | Status: SHIPPED | OUTPATIENT
Start: 2017-10-26 | End: 2017-11-14

## 2017-10-26 NOTE — TELEPHONE ENCOUNTER
Was the patient seen in the last year in this department? Yes     Does patient have an active prescription for medications requested? No     Received Request Via: Pharmacy     Last Visit: 9/19/17    Last Labs: 7/11/17

## 2017-11-01 ENCOUNTER — APPOINTMENT (OUTPATIENT)
Dept: MEDICAL GROUP | Facility: MEDICAL CENTER | Age: 82
End: 2017-11-01
Payer: MEDICARE

## 2017-11-07 ENCOUNTER — OFFICE VISIT (OUTPATIENT)
Dept: MEDICAL GROUP | Facility: MEDICAL CENTER | Age: 82
End: 2017-11-07
Payer: MEDICARE

## 2017-11-07 VITALS
BODY MASS INDEX: 21.34 KG/M2 | RESPIRATION RATE: 16 BRPM | DIASTOLIC BLOOD PRESSURE: 84 MMHG | HEIGHT: 64 IN | WEIGHT: 125 LBS | TEMPERATURE: 98 F | OXYGEN SATURATION: 97 % | HEART RATE: 96 BPM | SYSTOLIC BLOOD PRESSURE: 152 MMHG

## 2017-11-07 DIAGNOSIS — M81.0 OSTEOPOROSIS, UNSPECIFIED OSTEOPOROSIS TYPE, UNSPECIFIED PATHOLOGICAL FRACTURE PRESENCE: ICD-10-CM

## 2017-11-07 DIAGNOSIS — M25.562 ACUTE PAIN OF LEFT KNEE: ICD-10-CM

## 2017-11-07 DIAGNOSIS — R03.0 ELEVATED BLOOD PRESSURE, SITUATIONAL: ICD-10-CM

## 2017-11-07 PROBLEM — I10 HTN (HYPERTENSION): Status: RESOLVED | Noted: 2017-07-13 | Resolved: 2017-11-07

## 2017-11-07 PROCEDURE — 99214 OFFICE O/P EST MOD 30 MIN: CPT | Performed by: FAMILY MEDICINE

## 2017-11-07 NOTE — PATIENT INSTRUCTIONS
Lisinopril Adverse Reactions  >10%:  Cardiovascular: Hypotension (4% to 11%)  Central nervous system: Dizziness (4% to 19%)  Renal: Increased serum creatinine (?10%; transient), increased blood urea nitrogen (?2%; transient)  1% to 10%:  Cardiovascular: Syncope (5% to 7%), chest pain (2% to 3%), flushing (?1%), orthostatic effect (?1%), vasculitis (?1%)  Central nervous system: Headache (4% to 6%), altered sense of smell (?1%), fatigue (?1%), paresthesia (?1%), vertigo (?1%)  Dermatologic: Skin rash (?1% to 2%), alopecia (?1%), diaphoresis (?1%), erythema (?1%), pruritus (?1%), skin photosensitivity (?1%), Barros-Myles syndrome (?1%), toxic epidermal necrolysis (?1%), urticaria (?1%)  Endocrine & metabolic: Hyperkalemia (2% to 6%), diabetes mellitus (?1%), gout (?1%), SIADH (?1%)  Gastrointestinal: Diarrhea (?1% to 4%), constipation (?1%), dysgeusia (?1%), flatulence (?1%), pancreatitis (?1%), xerostomia (?1%)  Genitourinary: Impotence (?1%)  Hematologic & oncologic: Bone marrow depression (?1%), eosinophilia (?1%), hemolytic anemia (?1%), increased erythrocyte sedimentation rate (?1%), leukocytosis (?1%), leukopenia (?1%), neutropenia (?1%), positive SAVANNAH titer (?1%), thrombocytopenia (?1%; mean decrease of 0.4 mg/dL)  Neuromuscular & skeletal: Arthralgia (?1%), arthritis (?1%), myalgia (?1%), weakness (?1%)  Ophthalmic: Blurred vision (?1%), diplopia (?1%), photophobia (?1%), vision loss (?1%)  Otic: Tinnitus (?1%)  Renal: Renal insufficiency (in patients with acute myocardial infarction: 1% to 2%)  Respiratory: Cough (3% to 4%)  Frequency not defined:  Hematologic & oncologic: Decreased hematocrit, decreased hemoglobin (mean decrease of 1.3%)  Hepatic: Increased liver enzymes, increased serum bilirubin  <1%, postmarketing, and/or case reports: Acute renal failure, angioedema, confusion, cutaneous pseudolymphoma, dehydration, fever, hallucination, hypoglycemia (diabetic patients on oral antidiabetic agents or  insulin), hyponatremia, mood changes (including depressive symptoms), psoriasis, visual hallucination

## 2017-11-08 NOTE — ASSESSMENT & PLAN NOTE
Patient states that she has considered her options as we discussed at previous clinic visit, and has decided to not take bisphosphonate as this can increase risk of osteonecrosis of the jaw.  Instead, patient will proceed with vitamin D and calcium supplementation as well as weight-bearing exercise.  I clarified for patient that osteoporosis increases her risk of pathologic fracture, so it is in her best interest to continue with balance exercises and weight-bearing exercises to decrease risk of falls as well as to help increase bone density, respectively.  Patient verbalizes understanding, states that she has recently started a Pilates class, and is also using a treadmill at approximately 2mph.   Patient with L knee pain that started shortly after starting the new cardiovascular exercise/weight-bearing exercise routine.    ROS is NEGATIVE for BLE radicular pain/numbness/tingling, BLE knee buckling, bilateral foot drop.

## 2017-11-10 NOTE — ASSESSMENT & PLAN NOTE
Patient with elevated blood pressure, reports that this is secondary to white coat hypertension.  She is not taking Lisinopril at present, verbalizes understanding of risks of hypertensive emergency including but not limited to ischemic cardiomyopathy as well as cerebrovascular accident.    ROS is NEGATIVE for dizziness, generalized weakness/fatigue, cold sweats, dizziness,  vision/hearing changes, jaw pain/paresthesias, BUE pain/paresthesias/numbness/weakness, chest pain/pressure, palpitations, dyspnea, nausea, RUQ abdominal pain, oliguria/anuria, BLE edema.    ROS is NEGATIVE for confusion, altered mentation, word finding difficulty, memory loss, diplopia, visual scotomas, facial droop, dysarthria, dysphagia, hemiplegia, gait instability, new/abnormal paresthesias/numbness.

## 2017-11-10 NOTE — PROGRESS NOTES
Subjective:   Chief Complaint/History of Present Illness:  Char Rockwell is a 87 y.o. female established patient who presents today to discuss medical conditions as listed below:    Osteoporosis  Patient states that she has considered her options as we discussed at previous clinic visit, and has decided to not take bisphosphonate as this can increase risk of osteonecrosis of the jaw.  Instead, patient will proceed with vitamin D and calcium supplementation as well as weight-bearing exercise.  I clarified for patient that osteoporosis increases her risk of pathologic fracture, so it is in her best interest to continue with balance exercises and weight-bearing exercises to decrease risk of falls as well as to help increase bone density, respectively.  Patient verbalizes understanding, states that she has recently started a Pilates class, and is also using a treadmill at approximately 2mph.   Patient with L knee pain that started shortly after starting the new cardiovascular exercise/weight-bearing exercise routine.    ROS is NEGATIVE for BLE radicular pain/numbness/tingling, BLE knee buckling, bilateral foot drop.    Acute pain of left knee  Please see notes from same date of service 11/7/2017 re: osteoporosis.    Elevated blood pressure, situational  Patient with elevated blood pressure, reports that this is secondary to white coat hypertension.  She is not taking Lisinopril at present, verbalizes understanding of risks of hypertensive emergency including but not limited to ischemic cardiomyopathy as well as cerebrovascular accident.    ROS is NEGATIVE for dizziness, generalized weakness/fatigue, cold sweats, dizziness,  vision/hearing changes, jaw pain/paresthesias, BUE pain/paresthesias/numbness/weakness, chest pain/pressure, palpitations, dyspnea, nausea, RUQ abdominal pain, oliguria/anuria, BLE edema.    ROS is NEGATIVE for confusion, altered mentation, word finding difficulty, memory loss, diplopia,  "visual scotomas, facial droop, dysarthria, dysphagia, hemiplegia, gait instability, new/abnormal paresthesias/numbness.      Patient Active Problem List    Diagnosis Date Noted   • Closed displaced intertrochanteric fracture of left femur with routine healing 05/29/2017     Priority: High   • Acute pain of left knee 11/07/2017   • Postmenopausal estrogen deficiency 06/29/2017   • Prediabetes 06/29/2017   • Systolic murmur 06/29/2017   • Osteoporosis 08/24/2016   • Elevated blood pressure, situational 08/24/2016   • Preventative health care 08/24/2016   • Hyperlipidemia LDL goal <100 08/24/2016   • H/O bilateral cataract extraction 08/24/2016       Additional History:   Allergies:    Soy allergy     Current Medications:     Current Outpatient Prescriptions   Medication Sig Dispense Refill   • lisinopril (PRINIVIL) 10 MG Tab TAKE 1 TAB BY MOUTH EVERY DAY. 90 Tab 1   • metoprolol (LOPRESSOR) 25 MG Tab TAKE 1/2 TABLET (12.5 MG) BY MOUTH TWICE A DAY 90 Tab 1   • Cholecalciferol (VITAMIN D3) 2000 UNITS Tab Take 1 tablet by mouth every day for 180 days. 90 Tab 1   • acetaminophen (TYLENOL) 325 MG Tab Take 2 Tabs by mouth every 6 hours. 30 Tab 0   • tramadol (ULTRAM) 50 MG Tab Take 1-2 Tabs by mouth every 6 hours as needed for Moderate Pain or Severe Pain. 60 Tab 1     No current facility-administered medications for this visit.         Social History:     Social History   Substance Use Topics   • Smoking status: Never Smoker   • Smokeless tobacco: Never Used   • Alcohol use No       ROS:     - NOTE: All other systems reviewed and are negative, except as in HPI.     Objective:   Physical Exam:    Vitals: Blood pressure 152/84, pulse 96, temperature 36.7 °C (98 °F), resp. rate 16, height 1.638 m (5' 4.49\"), weight 56.7 kg (125 lb), SpO2 97 %.   BMI: Body mass index is 21.13 kg/m².   General/Constitutional: Vitals as above, Well nourished, well developed female in no acute distress   Head/Eyes: Head is grossly normal & " atraumatic, bilateral conjunctivae clear and not injected, bilateral EOMI, bilateral PERRL   ENT: Bilateral external ears grossly normal in appearance, Hearing grossly intact, External nares normal in appearance and without discharge/bleeding   Respiratory: No respiratory distress, bilateral lungs are clear to ausculation in all lung fields (anterior/lateral/posterior), no wheezing/rhonchi/rales   Cardiovascular: Regular rate and rhythm without murmur/gallops/rubs, distal pulses are intact and equal bilaterally (radial, posterior tibial), no bilateral lower extremity edema   MSK: Gait grossly normal & not antalgic, mild crepitus of left knee on passive and active range of motion   Integumentary: No apparent rashes   Psych: Judgment grossly appropriate, no apparent depression/anxiety    Health Maintenance:      - 07/12/17 -- DEXA Scan -- Patient has Osteoporosis, reaffirming previous diagnosis    Imaging/Labs:      - Review of records reveals that there are no recent Vitamin D levels on record.    Assessment and Plan:   1. Osteoporosis, unspecified osteoporosis type, unspecified pathological fracture presence  Uncontrolled.  Patient declines bisphosphonate pharmacotherapy 2/2 possible side effect of osteonecrosis of the jaw with dental extraction.  Check Vitamin D level, and patient to continue with vitamin D + calcium supplements in the meantime.   - VITAMIN D,25 HYDROXY; Future    2. Acute pain of left knee  Uncontrolled, suspect mild to moderate OA of left knee.    3. Elevated blood pressure, situational  Uncontrolled, likely 2/2 white coat hypertension.  Patient aware of ER precautions for malignant hypertension.        RTC: in 03/2018 for Medicare Annual Wellness exam.    PLEASE NOTE: This dictation was created using voice recognition software. I have made every reasonable attempt to correct obvious errors, but I expect that there are errors of grammar and possibly content that I did not discover before  finalizing the note.

## 2017-11-14 ENCOUNTER — APPOINTMENT (OUTPATIENT)
Dept: RADIOLOGY | Facility: MEDICAL CENTER | Age: 82
End: 2017-11-14
Attending: EMERGENCY MEDICINE
Payer: MEDICARE

## 2017-11-14 ENCOUNTER — HOSPITAL ENCOUNTER (EMERGENCY)
Facility: MEDICAL CENTER | Age: 82
End: 2017-11-14
Attending: EMERGENCY MEDICINE
Payer: MEDICARE

## 2017-11-14 VITALS
OXYGEN SATURATION: 98 % | BODY MASS INDEX: 21.65 KG/M2 | WEIGHT: 128.09 LBS | RESPIRATION RATE: 18 BRPM | TEMPERATURE: 97.4 F | HEART RATE: 62 BPM | DIASTOLIC BLOOD PRESSURE: 70 MMHG | SYSTOLIC BLOOD PRESSURE: 129 MMHG

## 2017-11-14 DIAGNOSIS — S42.91XA SHOULDER FRACTURE, RIGHT, CLOSED, INITIAL ENCOUNTER: ICD-10-CM
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
## 2017-11-14 PROCEDURE — 73200 CT UPPER EXTREMITY W/O DYE: CPT | Mod: RT

## 2017-11-14 PROCEDURE — 99284 EMERGENCY DEPT VISIT MOD MDM: CPT | Mod: 25

## 2017-11-14 PROCEDURE — 73030 X-RAY EXAM OF SHOULDER: CPT | Mod: RT

## 2017-11-14 RX ORDER — IBUPROFEN 200 MG
400 TABLET ORAL EVERY 6 HOURS PRN
Status: SHIPPED | COMMUNITY
End: 2018-04-30

## 2017-11-14 ASSESSMENT — PAIN SCALES - GENERAL: PAINLEVEL_OUTOF10: 0

## 2017-11-14 NOTE — ED NOTES
Pain and swelling below right shoulder following fall. States caught foot on carpet. Distal CMS intact. Right radial pulse 2+.   Offered tylenol. Declined - stated took 2 advil PTA. Given ice pack.

## 2017-11-15 NOTE — ED PROVIDER NOTES
ED Provider Note    CHIEF COMPLAINT  Chief Complaint   Patient presents with   • Fall   • Shoulder Pain       HPI  Char Rockwell is a 87 y.o. female who presents To the ER for right shoulder pain.  The patient had been working out when she was walking, tripped on a carpet and fell on her right shoulder.  She had pain and deformity to her right shoulder.  Since that time.  She denies any other injury.  She did not hit her head or injure her neck or chest or abdomen.  She had recent hip injuries, but no hip pain today.  She is ambulatory.  No numbness or tingling distally.  No other acute concerns or complaints.  Denies taking blood thinners.    REVIEW OF SYSTEMS  See HPI for further details. All other systems are negative.    PAST MEDICAL HISTORY  Past Medical History:   Diagnosis Date   • Allergy     environmental   • Cataract        FAMILY HISTORY  Family History   Problem Relation Age of Onset   • Family history unknown: Yes       SOCIAL HISTORY  Social History     Social History   • Marital status:      Spouse name: N/A   • Number of children: N/A   • Years of education: N/A     Social History Main Topics   • Smoking status: Never Smoker   • Smokeless tobacco: Never Used   • Alcohol use No   • Drug use: No   • Sexual activity: Not Currently     Other Topics Concern   • Not on file     Social History Narrative   • No narrative on file       SURGICAL HISTORY  Past Surgical History:   Procedure Laterality Date   • HIP NAILING INTRAMEDULLARY Left 5/30/2017    Procedure: HIP NAILING INTRAMEDULLARY;  Surgeon: Zion Pimentel M.D.;  Location: SURGERY [facility];  Service:    • MAMMOPLASTY AUGMENTATION  age 30's       CURRENT MEDICATIONS  Home Medications     Reviewed by Carlie Smallwood (Pharmacy Tech) on 11/14/17 at 1627  Med List Status: Complete   Medication Last Dose Status   Cholecalciferol (VITAMIN D3) 2000 UNITS Tab > 2 days Active   ibuprofen (MOTRIN) 200 MG Tab 11/14/2017 Active                 ALLERGIES  Allergies   Allergen Reactions   • Soy Allergy      Upset stomach        PHYSICAL EXAM  VITAL SIGNS: /70   Pulse 87   Temp 36.3 °C (97.4 °F)   Resp 18   Wt 58.1 kg (128 lb 1.4 oz)   SpO2 98%   BMI 21.65 kg/m²    Constitutional: Well developed, Well nourished, No acute distress, Non-toxic appearance.   HENT: Normocephalic, Atraumatic, Bilateral external ears normal, Oropharynx moist, No oral exudates, Nose normal.   Eyes: PERRL, EOMI, Conjunctiva normal, No discharge.   Cardiovascular: Normal heart rate, Normal rhythm, No murmurs, No rubs, No gallops.   Thorax & Lungs: Normal breath sounds, No respiratory distress, No wheezing,  Abdomen: Bowel sounds normal, Soft, No tenderness,   Musculoskeletal: Right shoulder is very swollen, consistent with large hemarthrosis.  This is not ranged.  Normal pulse and distal neurovascular exam.  No humerus tenderness of the proximal humeral tenderness.  No distal humeral tenderness or arm tenderness.  No neck or back tenderness.  Neurologic: Alert & oriented x 3,  No focal deficits noted.   Psychiatric: Affect normal         RADIOLOGY/PROCEDURES  DX-SHOULDER 2+ RIGHT   Final Result      Comminuted acute intra-articular humeral surgical neck and greater tuberosity fractures      CT-SHOULDER W/O RIGHT    (Results Pending)         COURSE & MEDICAL DECISION MAKING  Pertinent Labs & Imaging studies reviewed. (See chart for details)  The patient presents to the ER after ground level fall and sustaining pain and deformity of the right shoulder.  X-ray shows a fracture of this impacted.  I cannot tell if it is dislocated or not.  Therefore, I have added a CT.    CT shows an impaction fracture but no dislocation.    The patient has a normal neurovascular exam.  She appears to be otherwise uninjured from this fall.    The patient is placed in a shoulder immobilizer.  Referred to the orthopedic doctor on call.  Return to the ER for pain, or other concerns.   Questions are answered.  She is agreeable with the plan.      The patient is noted be hypertensive.  She is counseled to follow up with her doctor to have this rechecked in a week      Benny Lira M.D.  [address]  [place] [address]  [phone]    Schedule an appointment as soon as possible for a visit in 2 days        FINAL IMPRESSION  1. Shoulder fracture, right, closed, initial encounter    2. Fall from ground level        2.   3.         Electronically signed by: Sachin Bangura, 11/14/2017 4:41 PM

## 2017-11-15 NOTE — ED NOTES
"Med rec updated and complete  Allergies reviewed  Pt states \"No prescription medications\".  Pt states \"No antibiotics in the last 30 days\".  Pt states \"I stopped my VITAMINS D3 ago 2 days because it made me sick\".     "

## 2018-01-24 ENCOUNTER — OFFICE VISIT (OUTPATIENT)
Dept: MEDICAL GROUP | Facility: MEDICAL CENTER | Age: 83
End: 2018-01-24
Payer: MEDICARE

## 2018-01-24 VITALS
WEIGHT: 128 LBS | BODY MASS INDEX: 21.33 KG/M2 | HEART RATE: 90 BPM | TEMPERATURE: 97.6 F | HEIGHT: 65 IN | DIASTOLIC BLOOD PRESSURE: 60 MMHG | SYSTOLIC BLOOD PRESSURE: 128 MMHG | OXYGEN SATURATION: 96 %

## 2018-01-24 DIAGNOSIS — R05.9 COUGH: ICD-10-CM

## 2018-01-24 PROCEDURE — 99214 OFFICE O/P EST MOD 30 MIN: CPT | Performed by: INTERNAL MEDICINE

## 2018-01-24 RX ORDER — BENZONATATE 200 MG/1
200 CAPSULE ORAL 3 TIMES DAILY PRN
Qty: 30 CAP | Refills: 2 | Status: SHIPPED | OUTPATIENT
Start: 2018-01-24 | End: 2018-01-31

## 2018-01-24 RX ORDER — AZITHROMYCIN 250 MG/1
TABLET, FILM COATED ORAL
Qty: 6 TAB | Refills: 0 | Status: SHIPPED | OUTPATIENT
Start: 2018-01-24 | End: 2018-04-30

## 2018-01-24 NOTE — PROGRESS NOTES
CHIEF COMPLAINT  Chief Complaint   Patient presents with   • Cough     x 1 days      HPI  Patient is a 87 y.o. female patient who presents today for the following     ACUTE  The patient is 86 y/o, F, otherwise healthy, no meds    She got sick yesterday, with:  · Cough without phlegm  · Chills  · Sinus HA this morning    Denies:   · Nasal congestion w   d/c  · Postnasal drip, pain in sinus areas  · Fever, body aches  · Sore throat  · Swollen glands, difficulty swallowing  · Earache  · Wheezing, chest pain  · Decreased appetite, nausea, vomiting, diarrhea, abdominal pain  · Skin rash.    · Meds used:   Tylenol  · Sick contact:  N  · Flu vaccine:    Y    Reviewed PMH, PSH, FH, SH, ALL, HCM/IMM, no changes  Reviewed MEDS, no changes    Patient Active Problem List    Diagnosis Date Noted   • Closed displaced intertrochanteric fracture of left femur with routine healing 05/29/2017     Priority: High   • Acute pain of left knee 11/07/2017   • Postmenopausal estrogen deficiency 06/29/2017   • Prediabetes 06/29/2017   • Systolic murmur 06/29/2017   • Osteoporosis 08/24/2016   • Elevated blood pressure, situational 08/24/2016   • Preventative health care 08/24/2016   • Hyperlipidemia LDL goal <100 08/24/2016   • H/O bilateral cataract extraction 08/24/2016     CURRENT MEDICATIONS  Current Outpatient Prescriptions   Medication Sig Dispense Refill   • Cholecalciferol (VITAMIN D3) 2000 UNITS Tab Take 1 tablet by mouth every day for 180 days. 90 Tab 1   • ibuprofen (MOTRIN) 200 MG Tab Take 400 mg by mouth every 6 hours as needed for Mild Pain.       No current facility-administered medications for this visit.      ALLERGIES  Allergies: Soy allergy  PAST MEDICAL HISTORY  Past Medical History:   Diagnosis Date   • Allergy     environmental   • Cataract      SURGICAL HISTORY  She  has a past surgical history that includes mammoplasty augmentation (age 30's) and hip nailing intramedullary (Left, 5/30/2017).  SOCIAL HISTORY  Social  "History   Substance Use Topics   • Smoking status: Never Smoker   • Smokeless tobacco: Never Used   • Alcohol use No     Social History     Social History Narrative   • No narrative on file     FAMILY HISTORY  Family History   Problem Relation Age of Onset   • Family history unknown: Yes     Family Status   Relation Status   • Mother    • Father    • Daughter Alive     ROS   Constitutional: as above.  HENT: as above.  Eyes: Negative for blurred vision.   Respiratory: as above.  Cardiovascular: Negative for chest pain, palpitations.   Gastrointestinal: Negative for heartburn, nausea, abdominal pain.   Genitourinary: Negative for dysuria.  Musculoskeletal: Negative for significant myalgias, back pain and joint pain.   Skin: Negative for rash and itching.   Neuro: Negative for dizziness, weakness and headaches.   Endo/Heme/Allergies: Does not bruise/bleed easily.   Psychiatric/Behavioral: Negative for depression.    PHYSICAL EXAM   Blood pressure 128/60, pulse 90, temperature 36.4 °C (97.6 °F), height 1.638 m (5' 4.5\"), weight 58.1 kg (128 lb), SpO2 96 %. Body mass index is 21.63 kg/m².  General:  NAD, appears acutely sick  HEENT:   NC/AT, PERRLA, EOMI, TMs are clear. Pharyngeal mucosa is erythematous,  without lesions;  no cervical / supraclavicular  lymphadenopathy, no thyromegaly.    Cardiovascular: RRR.   No m/r/g. No carotid bruits .      Lungs:   CTAB, no w/r/r, no respiratory distress.  Abdomen: Soft, NT/ND + BS; no suprapubic tenderness; no masses or hepatosplenomegaly.  Extremities:  2+ DP and radial pulses bilaterally.  No c/c/e.   Skin:  Warm, dry.  No erythema. No rash.   Neurologic: Alert & oriented x 3. No focal deficits.  Psychiatric:  Affect normal, mood normal, judgment normal.    LABS     Labs are reviewed and discussed with a patient  Lab Results   Component Value Date/Time    CHOLSTRLTOT 236 (H) 2017 09:51 AM     (H) 2017 09:51 AM    HDL 57 2017 09:51 AM    " TRIGLYCERIDE 148 07/11/2017 09:51 AM       Lab Results   Component Value Date/Time    SODIUM 136 06/02/2017 07:14 AM    POTASSIUM 4.0 06/02/2017 07:14 AM    CHLORIDE 105 06/02/2017 07:14 AM    CO2 23 06/02/2017 07:14 AM    GLUCOSE 122 (H) 06/02/2017 07:14 AM    BUN 20 06/02/2017 07:14 AM    CREATININE 0.88 06/02/2017 07:14 AM     Lab Results   Component Value Date/Time    ALKPHOSPHAT 57 05/30/2017 05:59 PM    ASTSGOT 13 05/30/2017 05:59 PM    ALTSGPT 14 05/30/2017 05:59 PM    TBILIRUBIN 0.5 05/30/2017 05:59 PM      Lab Results   Component Value Date/Time    HBA1C 5.7 (H) 05/30/2017 07:08 PM       CBC after ortho surgery:  Lab Results   Component Value Date/Time    WBC 7.5 06/02/2017 02:48 AM    RBC 2.64 (L) 06/02/2017 02:48 AM    HEMOGLOBIN 8.5 (L) 06/02/2017 08:37 AM    HEMATOCRIT 24.2 (L) 06/02/2017 02:48 AM    HEMATOCRIT 24.3 (L) 06/02/2017 02:48 AM    MCV 92.0 06/02/2017 02:48 AM    MCH 29.5 06/02/2017 02:48 AM    MCHC 32.1 (L) 06/02/2017 02:48 AM    MPV 10.1 06/02/2017 02:48 AM    NEUTSPOLYS 64.00 06/02/2017 02:48 AM    LYMPHOCYTES 21.20 (L) 06/02/2017 02:48 AM    MONOCYTES 11.40 06/02/2017 02:48 AM    EOSINOPHILS 1.90 06/02/2017 02:48 AM    BASOPHILS 0.40 06/02/2017 02:48 AM      IMAGING     None    ASSESMENT AND PLAN        1. Cough  Exam was benign, short duration, no sx of bacterial infection/wheezing  - benzonatate (TESSALON) 200 MG capsule; Take 1 Cap by mouth 3 times a day as needed for Cough for up to 7 days.  Dispense: 30 Cap; Refill: 2    On hold, office to be notified about the course/worsening infection    - azithromycin (ZITHROMAX) 250 MG Tab; Take 1 tabl twice daily the first day, then 1 tabl daily, PO.  Dispense: 6 Tab; Refill: 0  - Reviewed effects and side effects of medication.    Advised   - increase fluid intake;   - may take Tylenol/ibuprofen for fever, pain    Counseling:   - Smoking:  Nonsmoker    Followup: as needed    All questions are answered.    Please note that this dictation was  created using voice recognition software, and that there might be errors of devin and possibly content.

## 2018-04-30 ENCOUNTER — HOSPITAL ENCOUNTER (OUTPATIENT)
Dept: LAB | Facility: MEDICAL CENTER | Age: 83
End: 2018-04-30
Attending: PHYSICIAN ASSISTANT
Payer: MEDICARE

## 2018-04-30 ENCOUNTER — OFFICE VISIT (OUTPATIENT)
Dept: MEDICAL GROUP | Facility: MEDICAL CENTER | Age: 83
End: 2018-04-30
Payer: MEDICARE

## 2018-04-30 ENCOUNTER — HOSPITAL ENCOUNTER (OUTPATIENT)
Facility: MEDICAL CENTER | Age: 83
End: 2018-04-30
Attending: PHYSICIAN ASSISTANT
Payer: MEDICARE

## 2018-04-30 VITALS
HEART RATE: 71 BPM | OXYGEN SATURATION: 97 % | WEIGHT: 127 LBS | TEMPERATURE: 98.3 F | DIASTOLIC BLOOD PRESSURE: 68 MMHG | BODY MASS INDEX: 21.16 KG/M2 | HEIGHT: 65 IN | SYSTOLIC BLOOD PRESSURE: 144 MMHG

## 2018-04-30 DIAGNOSIS — N30.00 ACUTE CYSTITIS WITHOUT HEMATURIA: ICD-10-CM

## 2018-04-30 DIAGNOSIS — R30.9 URINARY PAIN: ICD-10-CM

## 2018-04-30 LAB
ANION GAP SERPL CALC-SCNC: 7 MMOL/L (ref 0–11.9)
APPEARANCE UR: ABNORMAL
BASOPHILS # BLD AUTO: 0.5 % (ref 0–1.8)
BASOPHILS # BLD: 0.03 K/UL (ref 0–0.12)
BILIRUB UR STRIP-MCNC: NEGATIVE MG/DL
BUN SERPL-MCNC: 17 MG/DL (ref 8–22)
CALCIUM SERPL-MCNC: 9.6 MG/DL (ref 8.4–10.2)
CHLORIDE SERPL-SCNC: 105 MMOL/L (ref 96–112)
CO2 SERPL-SCNC: 28 MMOL/L (ref 20–33)
COLOR UR AUTO: ABNORMAL
CREAT SERPL-MCNC: 0.74 MG/DL (ref 0.5–1.4)
EOSINOPHIL # BLD AUTO: 0.09 K/UL (ref 0–0.51)
EOSINOPHIL NFR BLD: 1.5 % (ref 0–6.9)
ERYTHROCYTE [DISTWIDTH] IN BLOOD BY AUTOMATED COUNT: 44 FL (ref 35.9–50)
GLUCOSE SERPL-MCNC: 104 MG/DL (ref 65–99)
GLUCOSE UR STRIP.AUTO-MCNC: NEGATIVE MG/DL
HCT VFR BLD AUTO: 44.5 % (ref 37–47)
HGB BLD-MCNC: 14.9 G/DL (ref 12–16)
IMM GRANULOCYTES # BLD AUTO: 0.01 K/UL (ref 0–0.11)
IMM GRANULOCYTES NFR BLD AUTO: 0.2 % (ref 0–0.9)
KETONES UR STRIP.AUTO-MCNC: NEGATIVE MG/DL
LEUKOCYTE ESTERASE UR QL STRIP.AUTO: ABNORMAL
LYMPHOCYTES # BLD AUTO: 2.04 K/UL (ref 1–4.8)
LYMPHOCYTES NFR BLD: 34.2 % (ref 22–41)
MCH RBC QN AUTO: 29.7 PG (ref 27–33)
MCHC RBC AUTO-ENTMCNC: 33.5 G/DL (ref 33.6–35)
MCV RBC AUTO: 88.6 FL (ref 81.4–97.8)
MONOCYTES # BLD AUTO: 0.56 K/UL (ref 0–0.85)
MONOCYTES NFR BLD AUTO: 9.4 % (ref 0–13.4)
NEUTROPHILS # BLD AUTO: 3.24 K/UL (ref 2–7.15)
NEUTROPHILS NFR BLD: 54.2 % (ref 44–72)
NITRITE UR QL STRIP.AUTO: POSITIVE
NRBC # BLD AUTO: 0 K/UL
NRBC BLD-RTO: 0 /100 WBC
PH UR STRIP.AUTO: 6 [PH] (ref 5–8)
PLATELET # BLD AUTO: 217 K/UL (ref 164–446)
PMV BLD AUTO: 9.5 FL (ref 9–12.9)
POTASSIUM SERPL-SCNC: 4.4 MMOL/L (ref 3.6–5.5)
PROT UR QL STRIP: NEGATIVE MG/DL
RBC # BLD AUTO: 5.02 M/UL (ref 4.2–5.4)
RBC UR QL AUTO: ABNORMAL
SODIUM SERPL-SCNC: 140 MMOL/L (ref 135–145)
SP GR UR STRIP.AUTO: 1.01
UROBILINOGEN UR STRIP-MCNC: 0.2 MG/DL
WBC # BLD AUTO: 6 K/UL (ref 4.8–10.8)

## 2018-04-30 PROCEDURE — 87086 URINE CULTURE/COLONY COUNT: CPT

## 2018-04-30 PROCEDURE — 87077 CULTURE AEROBIC IDENTIFY: CPT

## 2018-04-30 PROCEDURE — 99214 OFFICE O/P EST MOD 30 MIN: CPT | Performed by: PHYSICIAN ASSISTANT

## 2018-04-30 PROCEDURE — 36415 COLL VENOUS BLD VENIPUNCTURE: CPT

## 2018-04-30 PROCEDURE — 80048 BASIC METABOLIC PNL TOTAL CA: CPT

## 2018-04-30 PROCEDURE — 85025 COMPLETE CBC W/AUTO DIFF WBC: CPT

## 2018-04-30 PROCEDURE — 87186 SC STD MICRODIL/AGAR DIL: CPT

## 2018-04-30 PROCEDURE — 81002 URINALYSIS NONAUTO W/O SCOPE: CPT | Performed by: PHYSICIAN ASSISTANT

## 2018-04-30 RX ORDER — ONDANSETRON 4 MG/1
4 TABLET, FILM COATED ORAL EVERY 4 HOURS PRN
Qty: 20 TAB | Refills: 0 | Status: SHIPPED | OUTPATIENT
Start: 2018-04-30 | End: 2018-08-21

## 2018-04-30 RX ORDER — SULFAMETHOXAZOLE AND TRIMETHOPRIM 800; 160 MG/1; MG/1
1 TABLET ORAL 2 TIMES DAILY
Qty: 14 TAB | Refills: 0 | Status: SHIPPED | OUTPATIENT
Start: 2018-04-30 | End: 2018-05-07

## 2018-04-30 NOTE — ASSESSMENT & PLAN NOTE
This is an 88-year-old female accompanied by her . 's name is gym. She complains about a one-week history of a possible urinary tract infection. Complains of a malodorous smell. Also claims of waking up at nighttime to urinate. Denies any dysuria or hematuria. Denies any back pain. No abdominal pain. No fever. Has had some nauseousness intermittently over the past week. Nauseousness has been worse today. Denies any vomiting. She's had UTIs in the past. Believes that Cipro might have caused her to have bad nauseousness.

## 2018-04-30 NOTE — PROGRESS NOTES
"Subjective:   Char Rockwell is a 88 y.o. female here today for possible urinary tract infection for approximately one week.    Acute cystitis without hematuria  This is an 88-year-old female accompanied by her . 's name is gym. She complains about a one-week history of a possible urinary tract infection. Complains of a malodorous smell. Also claims of waking up at nighttime to urinate. Denies any dysuria or hematuria. Denies any back pain. No abdominal pain. No fever. Has had some nauseousness intermittently over the past week. Nauseousness has been worse today. Denies any vomiting. She's had UTIs in the past. Believes that Cipro might have caused her to have bad nauseousness.       Current medicines (including changes today)  Current Outpatient Prescriptions   Medication Sig Dispense Refill   • Calcium Carbonate Antacid (TUMS PO) Take  by mouth.     • sulfamethoxazole-trimethoprim (BACTRIM DS) 800-160 MG tablet Take 1 Tab by mouth 2 times a day for 7 days. 14 Tab 0   • ondansetron (ZOFRAN) 4 MG Tab tablet Take 1 Tab by mouth every four hours as needed for Nausea/Vomiting. 20 Tab 0     No current facility-administered medications for this visit.      She  has a past medical history of Allergy and Cataract.    Social History and Family History were reviewed and updated.    ROS   No chest pain, no shortness of breath, no abdominal pain and all other systems were reviewed and are negative.       Objective:     Blood pressure 144/68, pulse 71, temperature 36.8 °C (98.3 °F), height 1.638 m (5' 4.5\"), weight 57.6 kg (127 lb), SpO2 97 %. Body mass index is 21.46 kg/m².   Physical Exam:  Constitutional: Alert, no distress.  Skin: Warm, dry, good turgor, no rashes in visible areas.  Eye: Equal, round and reactive, conjunctiva clear, lids normal.  ENMT: Lips without lesions, good dentition, oropharynx clear.  Neck: Trachea midline, no masses.   Lymph: No cervical or supraclavicular " lymphadenopathy  Respiratory: Unlabored respiratory effort, lungs clear to auscultation, no wheezes, no ronchi.  Cardiovascular: Normal S1, S2, murmur, no edema.  Abdomen: Soft, non-tender, no masses. No CVA tenderness.  Psych: Alert and oriented x3, normal affect and mood.        Assessment and Plan:   The following treatment plan was discussed    1. Acute cystitis without hematuria  Acute, new onset condition. Concerned about pyelonephritis. She appears not septic. Vitals are good. Did order CBC and BMP stat. We'll send off urine for culture. Urinalysis with 3+ leukocytes and positive nitrates. Treated with Bactrim double strength one tablet twice a day for 7 days. Advised if her labs return abnormal she may have to go to the emergency room. Also given Zofran to take as directed.  - URINE CULTURE(NEW); Future  - sulfamethoxazole-trimethoprim (BACTRIM DS) 800-160 MG tablet; Take 1 Tab by mouth 2 times a day for 7 days.  Dispense: 14 Tab; Refill: 0  - CBC WITH DIFFERENTIAL; Future  - BASIC METABOLIC PANEL; Future  - ondansetron (ZOFRAN) 4 MG Tab tablet; Take 1 Tab by mouth every four hours as needed for Nausea/Vomiting.  Dispense: 20 Tab; Refill: 0  - POCT Urinalysis      Followup: Return if symptoms worsen or fail to improve.    Please note that this dictation was created using voice recognition software. I have made every reasonable attempt to correct obvious errors, but I expect that there are errors of grammar and possibly content that I did not discover before finalizing the note.

## 2018-05-02 DIAGNOSIS — N30.00 ACUTE CYSTITIS WITHOUT HEMATURIA: ICD-10-CM

## 2018-05-04 ENCOUNTER — HOSPITAL ENCOUNTER (EMERGENCY)
Facility: MEDICAL CENTER | Age: 83
End: 2018-05-04
Attending: EMERGENCY MEDICINE
Payer: MEDICARE

## 2018-05-04 VITALS
SYSTOLIC BLOOD PRESSURE: 150 MMHG | WEIGHT: 127.21 LBS | TEMPERATURE: 98.6 F | HEIGHT: 64 IN | BODY MASS INDEX: 21.72 KG/M2 | HEART RATE: 75 BPM | DIASTOLIC BLOOD PRESSURE: 73 MMHG | OXYGEN SATURATION: 98 % | RESPIRATION RATE: 16 BRPM

## 2018-05-04 DIAGNOSIS — N30.00 ACUTE CYSTITIS WITHOUT HEMATURIA: ICD-10-CM

## 2018-05-04 DIAGNOSIS — R11.0 NAUSEA: ICD-10-CM

## 2018-05-04 LAB
APPEARANCE UR: ABNORMAL
BACTERIA #/AREA URNS HPF: ABNORMAL /HPF
BACTERIA UR CULT: ABNORMAL
BACTERIA UR CULT: ABNORMAL
BILIRUB UR QL STRIP.AUTO: NEGATIVE
COLOR UR: YELLOW
EKG IMPRESSION: NORMAL
EPI CELLS #/AREA URNS HPF: ABNORMAL /HPF
GLUCOSE UR STRIP.AUTO-MCNC: NEGATIVE MG/DL
KETONES UR STRIP.AUTO-MCNC: NEGATIVE MG/DL
LEUKOCYTE ESTERASE UR QL STRIP.AUTO: ABNORMAL
MICRO URNS: ABNORMAL
NITRITE UR QL STRIP.AUTO: NEGATIVE
PH UR STRIP.AUTO: 5.5 [PH]
PROT UR QL STRIP: NEGATIVE MG/DL
RBC # URNS HPF: ABNORMAL /HPF
RBC UR QL AUTO: ABNORMAL
SIGNIFICANT IND 70042: ABNORMAL
SITE SITE: ABNORMAL
SOURCE SOURCE: ABNORMAL
SP GR UR STRIP.AUTO: <=1.005
WBC #/AREA URNS HPF: ABNORMAL /HPF

## 2018-05-04 PROCEDURE — A9270 NON-COVERED ITEM OR SERVICE: HCPCS | Performed by: EMERGENCY MEDICINE

## 2018-05-04 PROCEDURE — 93005 ELECTROCARDIOGRAM TRACING: CPT | Performed by: EMERGENCY MEDICINE

## 2018-05-04 PROCEDURE — 700102 HCHG RX REV CODE 250 W/ 637 OVERRIDE(OP): Performed by: EMERGENCY MEDICINE

## 2018-05-04 PROCEDURE — 700111 HCHG RX REV CODE 636 W/ 250 OVERRIDE (IP): Performed by: EMERGENCY MEDICINE

## 2018-05-04 PROCEDURE — 99284 EMERGENCY DEPT VISIT MOD MDM: CPT

## 2018-05-04 PROCEDURE — 81001 URINALYSIS AUTO W/SCOPE: CPT

## 2018-05-04 RX ORDER — FAMOTIDINE 20 MG/1
20 TABLET, FILM COATED ORAL ONCE
Status: COMPLETED | OUTPATIENT
Start: 2018-05-04 | End: 2018-05-04

## 2018-05-04 RX ORDER — OMEPRAZOLE 20 MG/1
20 CAPSULE, DELAYED RELEASE ORAL DAILY
Qty: 30 CAP | Refills: 11 | Status: SHIPPED | OUTPATIENT
Start: 2018-05-04 | End: 2018-08-21

## 2018-05-04 RX ORDER — ONDANSETRON 4 MG/1
4 TABLET, ORALLY DISINTEGRATING ORAL EVERY 8 HOURS PRN
Qty: 20 TAB | Refills: 0 | Status: SHIPPED | OUTPATIENT
Start: 2018-05-04 | End: 2018-08-21

## 2018-05-04 RX ORDER — NITROFURANTOIN MACROCRYSTALS 50 MG/1
100 CAPSULE ORAL ONCE
Status: COMPLETED | OUTPATIENT
Start: 2018-05-04 | End: 2018-05-04

## 2018-05-04 RX ORDER — NITROFURANTOIN 25; 75 MG/1; MG/1
100 CAPSULE ORAL 2 TIMES DAILY
Qty: 14 CAP | Refills: 0 | Status: SHIPPED | OUTPATIENT
Start: 2018-05-04 | End: 2018-05-09

## 2018-05-04 RX ORDER — ONDANSETRON 4 MG/1
4 TABLET, ORALLY DISINTEGRATING ORAL ONCE
Status: COMPLETED | OUTPATIENT
Start: 2018-05-04 | End: 2018-05-04

## 2018-05-04 RX ADMIN — LIDOCAINE HYDROCHLORIDE 30 ML: 20 SOLUTION OROPHARYNGEAL at 09:30

## 2018-05-04 RX ADMIN — FAMOTIDINE 20 MG: 20 TABLET, FILM COATED ORAL at 09:29

## 2018-05-04 RX ADMIN — ONDANSETRON 4 MG: 4 TABLET, ORALLY DISINTEGRATING ORAL at 09:29

## 2018-05-04 RX ADMIN — NITROFURANTOIN (MACROCRYSTALS) 100 MG: 50 CAPSULE ORAL at 11:47

## 2018-05-04 NOTE — DISCHARGE INSTRUCTIONS
Please follow-up with your primary care provider for blood pressure management.    Use over-the-counter Tums and Pepcid Complete, possibly some Pepto-Bismol. Follow up with her gastroenterologist, follow-up with her primary care physician.      Gastritis, Adult  Gastritis is soreness and puffiness (inflammation) of the lining of the stomach. If you do not get help, gastritis can cause bleeding and sores (ulcers) in the stomach.  HOME CARE   · Only take medicine as told by your doctor.  · If you were given antibiotic medicines, take them as told. Finish the medicines even if you start to feel better.  · Drink enough fluids to keep your pee (urine) clear or pale yellow.  · Avoid foods and drinks that make your problems worse. Foods you may want to avoid include:  ¨ Caffeine or alcohol.  ¨ Chocolate.  ¨ Mint.  ¨ Garlic and onions.  ¨ Spicy foods.  ¨ Citrus fruits, including oranges, cisco, or limes.  ¨ Food containing tomatoes, including sauce, chili, salsa, and pizza.  ¨ Fried and fatty foods.  · Eat small meals throughout the day instead of large meals.  GET HELP RIGHT AWAY IF:   · You have black or dark red poop (stools).  · You throw up (vomit) blood. It may look like coffee grounds.  · You cannot keep fluids down.  · Your belly (abdominal) pain gets worse.  · You have a fever.  · You do not feel better after 1 week.  · You have any other questions or concerns.  MAKE SURE YOU:   · Understand these instructions.  · Will watch your condition.  · Will get help right away if you are not doing well or get worse.  This information is not intended to replace advice given to you by your health care provider. Make sure you discuss any questions you have with your health care provider.  Document Released: 06/05/2009 Document Revised: 03/11/2013 Document Reviewed: 09/10/2016  Elsevier Interactive Patient Education © 2017 [employer]

## 2018-05-04 NOTE — ED PROVIDER NOTES
ED Provider Note    CHIEF COMPLAINT  Chief Complaint   Patient presents with   • Nausea         HPI  Char Rockwell is a 88 y.o. female who presents to the ED with nausea. She has been having intermittent nausea over the last 2 weeks, she was having some urinary frequency so she went to see her doctor on Monday which is partially 4-5 days ago and was diagnosed with urinary tract infection, she was placed on Zofran non-dissolvable pills and Bactrim. The patient has no nausea but continues with nausea. She states sometimes it feels like it goes up into her chest no dulce maria abdominal pain no hematuria or dysuria, she states the cloudiness of frequency has gone away with her urine and her antibiotics. No fevers, no abdominal pains.    REVIEW OF SYSTEMS  See HPI for further details. All other systems are negative.     PAST MEDICAL HISTORY  Past Medical History:   Diagnosis Date   • Allergy     environmental   • Cataract        FAMILY HISTORY  Family History   Problem Relation Age of Onset   • Family history unknown: Yes       SOCIAL HISTORY  Social History     Social History   • Marital status:      Spouse name: N/A   • Number of children: N/A   • Years of education: N/A     Social History Main Topics   • Smoking status: Never Smoker   • Smokeless tobacco: Never Used   • Alcohol use No   • Drug use: No   • Sexual activity: Not Currently     Other Topics Concern   • Not on file     Social History Narrative   • No narrative on file       SURGICAL HISTORY  Past Surgical History:   Procedure Laterality Date   • HIP NAILING INTRAMEDULLARY Left 5/30/2017    Procedure: HIP NAILING INTRAMEDULLARY;  Surgeon: Zion Pimentel M.D.;  Location: SURGERY [facility];  Service:    • MAMMOPLASTY AUGMENTATION  age 30's       CURRENT MEDICATIONS  Home Medications     Reviewed by Carlie Jones (Pharmacy Tech) on 05/04/18 at 0851  Med List Status: Complete   Medication Last Dose Status   ondansetron (ZOFRAN) 4  "MG Tab tablet 5/2/2018 Active   sulfamethoxazole-trimethoprim (BACTRIM DS) 800-160 MG tablet 5/4/2018 Active                ALLERGIES  Allergies   Allergen Reactions   • Soy Allergy      Upset stomach        PHYSICAL EXAM  VITAL SIGNS: /73   Pulse 75   Temp 37 °C (98.6 °F)   Resp 16   Ht 1.626 m (5' 4\")   Wt 57.7 kg (127 lb 3.3 oz)   SpO2 98%   BMI 21.83 kg/m²   Constitutional: Well developed, Well nourished, mild distress, Non-toxic appearance.   HENT: Normocephalic, Atraumatic, Bilateral external ears normal, Oropharynx clear, No oral exudates, Nose normal.   Eyes: PERRLA, EOMI, Conjunctiva normal, No discharge.   Neck: Normal range of motion, No tenderness, Supple, No stridor.   Lymphatic: No lymphadenopathy noted.   Cardiovascular: Normal heart rate, Normal rhythm.   Thorax & Lungs: Normal breath sounds, No respiratory distress, No wheezing, No chest tenderness.   Abdomen: Soft, nontender, no rebound, no peritoneal signs  Skin: Warm, Dry, No erythema, No rash.   Back: No tenderness, No CVA tenderness.   Extremities: Intact distal pulses, No edema, No tenderness.   Neurologic: Alert & oriented x 3, moves all extremities spontaneously.     COURSE & MEDICAL DECISION MAKING  Pertinent Labs & Imaging studies reviewed. (See chart for details)  Patient with nausea of uncertain etiology this could be gastritis or GERD, possible acute coronary syndrome, we'll check an EKG, give the patient a GI cocktail, Zofran, check her urine. Laboratory tests were performed approximately 4-5 days ago and this was negative.    Patient feeling improved after GI cocktail, Pepcid, Zofran. Patient still does have bacteria in her urinalysis, we'll switch the patient to Macrobid, and appears that the patient likely has E. coli. I'm concerned about the 20-30% resistance rate with Bactrim. Believe the patient should be put on Prilosec, Zofran, Macrobid, have the patient follow up with the GI doctor, have the patient follow up with " primary care physician, return with worsening pain.      The patient will return for new or worsening symptoms and is stable at the time of discharge.    The patient is referred to a primary physician for blood pressure management, diabetic screening, and for all other preventative health concerns.        DISPOSITION:  Patient will be discharged home in stable condition.    FOLLOW UP:  [facility], Emergency Dept  [address]  [place] [id number] [phone]    If symptoms worsen    Doug Lewis M.D.  [address]  [address]  [facility] [id number] [phone]          Andrea Solomon M.D.  [address] [facility] [address]  [facility] [id number] [phone]            OUTPATIENT MEDICATIONS:  Discharge Medication List as of 5/4/2018 11:43 AM      START taking these medications    Details   omeprazole (PRILOSEC) 20 MG delayed-release capsule Take 1 Cap by mouth every day., Disp-30 Cap, R-11, Normal      ondansetron (ZOFRAN ODT) 4 MG TABLET DISPERSIBLE Take 1 Tab by mouth every 8 hours as needed., Disp-20 Tab, R-0, Normal      nitrofurantoin monohydr macro (MACROBID) 100 MG Cap Take 1 Cap by mouth 2 times a day for 5 days., Disp-14 Cap, R-0, Normal               FINAL IMPRESSION  1. Nausea    2. Acute cystitis without hematuria        Patient referred to primary care provider for blood pressure management    This dictation was created using voice recognition software. The accuracy of the dictation is limited to the abilities of the software. I expect there may be some errors of grammar and possibly content. The nursing notes were reviewed and certain aspects of this information were incorporated into this note.    Electronically signed by: Ras Bautista, 5/4/2018 9:13 AM

## 2018-05-04 NOTE — ED NOTES
Pt was dx with UTI on Monday and has been nausseated as well. Pt is on abx. Pt states she still has nausea off and on. Pt has zofran for nausea but states it isnt working.

## 2018-05-04 NOTE — ED NOTES
The Medication Reconciliation process has been completed by interviewing the patient    Allergies have been reviewed  Antibiotic use in 30 days - Bactrim started 4/30/18    Home Pharmacy:  [facility] [place]

## 2018-05-04 NOTE — ED NOTES
Report rec'd from george Rahman. Pt resting in stretcher with family at bedside. Updated on plan of care. Denies any needs at this time. Will continue to monitor.

## 2018-05-17 ENCOUNTER — HOSPITAL ENCOUNTER (OUTPATIENT)
Facility: MEDICAL CENTER | Age: 83
End: 2018-05-17
Attending: FAMILY MEDICINE
Payer: MEDICARE

## 2018-05-17 ENCOUNTER — OFFICE VISIT (OUTPATIENT)
Dept: MEDICAL GROUP | Facility: MEDICAL CENTER | Age: 83
End: 2018-05-17
Payer: MEDICARE

## 2018-05-17 VITALS
WEIGHT: 128 LBS | SYSTOLIC BLOOD PRESSURE: 144 MMHG | DIASTOLIC BLOOD PRESSURE: 78 MMHG | TEMPERATURE: 97.6 F | HEART RATE: 85 BPM | OXYGEN SATURATION: 99 % | BODY MASS INDEX: 21.85 KG/M2 | HEIGHT: 64 IN

## 2018-05-17 DIAGNOSIS — Z00.00 MEDICARE ANNUAL WELLNESS VISIT, SUBSEQUENT: ICD-10-CM

## 2018-05-17 DIAGNOSIS — N30.00 ACUTE CYSTITIS WITHOUT HEMATURIA: ICD-10-CM

## 2018-05-17 DIAGNOSIS — R11.0 CHRONIC NAUSEA: ICD-10-CM

## 2018-05-17 DIAGNOSIS — Z13.6 ENCOUNTER FOR LIPID SCREENING FOR CARDIOVASCULAR DISEASE: ICD-10-CM

## 2018-05-17 DIAGNOSIS — Z13.220 ENCOUNTER FOR LIPID SCREENING FOR CARDIOVASCULAR DISEASE: ICD-10-CM

## 2018-05-17 LAB
APPEARANCE UR: NORMAL
BILIRUB UR STRIP-MCNC: NORMAL MG/DL
COLOR UR AUTO: YELLOW
GLUCOSE UR STRIP.AUTO-MCNC: NORMAL MG/DL
KETONES UR STRIP.AUTO-MCNC: NORMAL MG/DL
LEUKOCYTE ESTERASE UR QL STRIP.AUTO: NORMAL
NITRITE UR QL STRIP.AUTO: NORMAL
PH UR STRIP.AUTO: 5.5 [PH] (ref 5–8)
PROT UR QL STRIP: NORMAL MG/DL
RBC UR QL AUTO: NORMAL
SP GR UR STRIP.AUTO: 1.01
UROBILINOGEN UR STRIP-MCNC: NORMAL MG/DL

## 2018-05-17 PROCEDURE — 81002 URINALYSIS NONAUTO W/O SCOPE: CPT | Performed by: FAMILY MEDICINE

## 2018-05-17 PROCEDURE — 87086 URINE CULTURE/COLONY COUNT: CPT

## 2018-05-17 PROCEDURE — 99214 OFFICE O/P EST MOD 30 MIN: CPT | Performed by: FAMILY MEDICINE

## 2018-05-17 RX ORDER — CEFDINIR 300 MG/1
300 CAPSULE ORAL 2 TIMES DAILY
Qty: 20 CAP | Refills: 0 | Status: SHIPPED | OUTPATIENT
Start: 2018-05-17 | End: 2018-05-27

## 2018-05-18 DIAGNOSIS — N30.00 ACUTE CYSTITIS WITHOUT HEMATURIA: ICD-10-CM

## 2018-05-19 LAB
AMBIGUOUS DTTM AMBI4: NORMAL
SIGNIFICANT IND 70042: NORMAL
SITE SITE: NORMAL
SOURCE SOURCE: NORMAL

## 2018-05-20 LAB
BACTERIA UR CULT: NORMAL
SIGNIFICANT IND 70042: NORMAL
SITE SITE: NORMAL
SOURCE SOURCE: NORMAL

## 2018-05-20 NOTE — ASSESSMENT & PLAN NOTE
Patient has nausea, likely associated with her urinary changes, therefore we cannot rule out pyelonephritis nor hydronephrosis at this time.  Please see notes from same date of service 5/17/2018 regarding acute cystitis without hematuria.

## 2018-05-20 NOTE — PROGRESS NOTES
Subjective:   Chief Complaint/History of Present Illness:  Char Rockwell is a 88 y.o. female established patient who presents today for evaluation of acute urinary symptoms:    Acute cystitis without hematuria  Chronic, uncontrolled, patient was initially evaluated by my colleague, Robert Ham ([facility]) for what was determined to be UTI on 4/30/2018.  Patient was given appropriate antibiotics.  However, due to persistent symptoms, patient presented to the ER for nausea, found to have bacteriuria, and therefore antibiotics were switched.  Patient had nearly completed her previous dose of antibiotics, completed the second dose of antibiotics, and continues to have mild urethral irritation.    ROS is POSITIVE for increased frequency of urination, otherwise is NEGATIVE for fevers, chills, rigors, flank pain, dysuria, hematuria, pyuria, polyuria, diarrhea, constipation.    Chronic nausea  Patient has nausea, likely associated with her urinary changes, therefore we cannot rule out pyelonephritis nor hydronephrosis at this time.  Please see notes from same date of service 5/17/2018 regarding acute cystitis without hematuria.      Patient Active Problem List    Diagnosis Date Noted   • Closed displaced intertrochanteric fracture of left femur with routine healing 05/29/2017     Priority: High   • Chronic nausea 05/17/2018   • Acute cystitis without hematuria 04/30/2018   • Acute pain of left knee 11/07/2017   • Postmenopausal estrogen deficiency 06/29/2017   • Prediabetes 06/29/2017   • Systolic murmur 06/29/2017   • Osteoporosis 08/24/2016   • Elevated blood pressure, situational 08/24/2016   • Preventative health care 08/24/2016   • Hyperlipidemia LDL goal <100 08/24/2016   • H/O bilateral cataract extraction 08/24/2016       Additional History:   Allergies:    Soy allergy     Current Medications:     Current Outpatient Prescriptions   Medication Sig Dispense Refill   • cefdinir (OMNICEF) 300 MG Cap Take 1 Cap  "by mouth 2 times a day for 10 days. 20 Cap 0   • omeprazole (PRILOSEC) 20 MG delayed-release capsule Take 1 Cap by mouth every day. 30 Cap 11   • ondansetron (ZOFRAN ODT) 4 MG TABLET DISPERSIBLE Take 1 Tab by mouth every 8 hours as needed. 20 Tab 0   • ondansetron (ZOFRAN) 4 MG Tab tablet Take 1 Tab by mouth every four hours as needed for Nausea/Vomiting. 20 Tab 0     No current facility-administered medications for this visit.         Social History:     Social History   Substance Use Topics   • Smoking status: Never Smoker   • Smokeless tobacco: Never Used   • Alcohol use No       ROS:     - NOTE: All other systems reviewed and are negative, except as in HPI.     Objective:   Physical Exam:    Vitals: Blood pressure 144/78, pulse 85, temperature 36.4 °C (97.6 °F), height 1.626 m (5' 4\"), weight 58.1 kg (128 lb), SpO2 99 %.   BMI: Body mass index is 21.97 kg/m².   General/Constitutional: Vitals as above, Well nourished, well developed female in minimal acute distress   Head/Eyes: Head is grossly normal & atraumatic, bilateral EOMI, bilateral PERRL   ENT: Bilateral external ears grossly normal in appearance, Hearing grossly intact   Respiratory: No respiratory distress, bilateral lungs are clear to ausculation in all lung fields (anterior/lateral/posterior), no wheezing/rhonchi/rales   Cardiovascular: Regular rate and rhythm without murmur/gallops/rubs, distal pulses are intact and equal bilaterally (radial, posterior tibial), no bilateral lower extremity edema   Gastrointestinal: Abdomen resonant to percussion, Bowel sounds present in all 4 quadrants, abdomen non-tender to deeppalpation, minimal tenderness to kidney ballotment bilaterally   MSK: Gait grossly normal & not antalgic, No CVAT bilaterally   Integumentary: No apparent rashes   Psych: Judgment grossly appropriate, no apparent depression/anxiety    Health Maintenance:     - Reviewed and uptodate apart from Tdap    Imaging/Labs:     -Point-of-care " urinalysis with trace leukocyte esterase, slightly cloudy, 1+ blood    - 04/30/18 -- UCx positive for Citrobacter braakii relatively pansensitive    Assessment and Plan:   1. Acute cystitis without hematuria  New problem, uncontrolled, urine to be sent off for further evaluation, and patient to be trialed on third antibiotic.   - POCT Urinalysis   - cefdinir (OMNICEF) 300 MG Cap; Take 1 Cap by mouth 2 times a day for 10 days.  Dispense: 20 Cap; Refill: 0   - URINE CULTURE(NEW); Future   - REFERRAL TO GASTROENTEROLOGY    2. Chronic nausea  Neupro, however uncontrolled.  This may be due to 2 different antibiotics in quick succession, however, patient is requesting referral to gastroenterologist for further evaluation and management.   - REFERRAL TO GASTROENTEROLOGY    3. Medicare annual wellness visit, subsequent  4. Encounter for lipid screening for cardiovascular disease    - COMP METABOLIC PANEL; Future   - LIPID PROFILE; Future    RTC: In 3 months for Medicare annual wellness visit.    PLEASE NOTE: This dictation was created using voice recognition software. I have made every reasonable attempt to correct obvious errors, but I expect that there are errors of grammar and possibly content that I did not discover before finalizing the note.

## 2018-05-20 NOTE — ASSESSMENT & PLAN NOTE
Chronic, uncontrolled, patient was initially evaluated by my colleague, Robert Ham ([facility]) for what was determined to be UTI on 4/30/2018.  Patient was given appropriate antibiotics.  However, due to persistent symptoms, patient presented to the ER for nausea, found to have bacteriuria, and therefore antibiotics were switched.  Patient had nearly completed her previous dose of antibiotics, completed the second dose of antibiotics, and continues to have mild urethral irritation.    ROS is POSITIVE for increased frequency of urination, otherwise is NEGATIVE for fevers, chills, rigors, flank pain, dysuria, hematuria, pyuria, polyuria, diarrhea, constipation.

## 2018-05-31 ENCOUNTER — APPOINTMENT (RX ONLY)
Dept: URBAN - METROPOLITAN AREA CLINIC 20 | Facility: CLINIC | Age: 83
Setting detail: DERMATOLOGY
End: 2018-05-31

## 2018-05-31 DIAGNOSIS — L81.4 OTHER MELANIN HYPERPIGMENTATION: ICD-10-CM

## 2018-05-31 DIAGNOSIS — D18.0 HEMANGIOMA: ICD-10-CM

## 2018-05-31 DIAGNOSIS — L82.1 OTHER SEBORRHEIC KERATOSIS: ICD-10-CM

## 2018-05-31 DIAGNOSIS — L57.8 OTHER SKIN CHANGES DUE TO CHRONIC EXPOSURE TO NONIONIZING RADIATION: ICD-10-CM

## 2018-05-31 DIAGNOSIS — D22 MELANOCYTIC NEVI: ICD-10-CM

## 2018-05-31 DIAGNOSIS — Z87.2 PERSONAL HISTORY OF DISEASES OF THE SKIN AND SUBCUTANEOUS TISSUE: ICD-10-CM

## 2018-05-31 PROBLEM — D22.5 MELANOCYTIC NEVI OF TRUNK: Status: ACTIVE | Noted: 2018-05-31

## 2018-05-31 PROBLEM — D18.01 HEMANGIOMA OF SKIN AND SUBCUTANEOUS TISSUE: Status: ACTIVE | Noted: 2018-05-31

## 2018-05-31 PROBLEM — D48.5 NEOPLASM OF UNCERTAIN BEHAVIOR OF SKIN: Status: ACTIVE | Noted: 2018-05-31

## 2018-05-31 PROCEDURE — ? BIOPSY BY SHAVE METHOD

## 2018-05-31 PROCEDURE — 99203 OFFICE O/P NEW LOW 30 MIN: CPT | Mod: 25

## 2018-05-31 PROCEDURE — ? COUNSELING

## 2018-05-31 PROCEDURE — 11100: CPT

## 2018-05-31 ASSESSMENT — LOCATION DETAILED DESCRIPTION DERM
LOCATION DETAILED: RIGHT CENTRAL MALAR CHEEK
LOCATION DETAILED: LEFT ANTERIOR DISTAL THIGH
LOCATION DETAILED: LEFT INFERIOR CENTRAL MALAR CHEEK
LOCATION DETAILED: RIGHT ANTERIOR DISTAL THIGH
LOCATION DETAILED: LEFT SUPERIOR LATERAL BUCCAL CHEEK
LOCATION DETAILED: RIGHT PROXIMAL DORSAL FOREARM
LOCATION DETAILED: LEFT MEDIAL SUPERIOR CHEST
LOCATION DETAILED: RIGHT DISTAL POSTERIOR UPPER ARM
LOCATION DETAILED: RIGHT SUPERIOR MEDIAL UPPER BACK
LOCATION DETAILED: LEFT PROXIMAL DORSAL FOREARM
LOCATION DETAILED: LEFT ANTERIOR PROXIMAL UPPER ARM
LOCATION DETAILED: RIGHT MID-UPPER BACK
LOCATION DETAILED: RIGHT ANTERIOR PROXIMAL UPPER ARM
LOCATION DETAILED: RIGHT INFERIOR UPPER BACK

## 2018-05-31 ASSESSMENT — LOCATION SIMPLE DESCRIPTION DERM
LOCATION SIMPLE: RIGHT POSTERIOR UPPER ARM
LOCATION SIMPLE: LEFT UPPER ARM
LOCATION SIMPLE: RIGHT FOREARM
LOCATION SIMPLE: LEFT THIGH
LOCATION SIMPLE: RIGHT CHEEK
LOCATION SIMPLE: LEFT FOREARM
LOCATION SIMPLE: LEFT CHEEK
LOCATION SIMPLE: RIGHT THIGH
LOCATION SIMPLE: RIGHT UPPER ARM
LOCATION SIMPLE: CHEST
LOCATION SIMPLE: RIGHT UPPER BACK

## 2018-05-31 ASSESSMENT — LOCATION ZONE DERM
LOCATION ZONE: TRUNK
LOCATION ZONE: ARM
LOCATION ZONE: LEG
LOCATION ZONE: FACE

## 2018-05-31 NOTE — PROCEDURE: BIOPSY BY SHAVE METHOD
Biopsy Type: H and E
Additional Anesthesia Volume In Cc (Will Not Render If 0): 0
Destruction After The Procedure: No
Notification Instructions: Patient will be notified of biopsy results. However, patient instructed to call the office if not contacted within 2 weeks.
Was A Bandage Applied: Yes
Post-Care Instructions: I reviewed with the patient in detail post-care instructions. Patient is to keep the biopsy site dry overnight, and then apply bacitracin twice daily until healed. Patient may apply hydrogen peroxide soaks to remove any crusting.
Curettage Text: The wound bed was treated with curettage after the biopsy was performed.
Cryotherapy Text: The wound bed was treated with cryotherapy after the biopsy was performed.
Detail Level: Detailed
Anesthesia Volume In Cc: 0.2
Type Of Destruction Used: Curettage
Size Of Lesion In Cm: 0.6
Silver Nitrate Text: The wound bed was treated with silver nitrate after the biopsy was performed.
Lab Facility: 
Billing Type: Third-Party Bill
Dressing: Band-Aid
Consent: Written consent was obtained and risks were reviewed including but not limited to scarring, infection, bleeding, scabbing, incomplete removal, nerve damage and allergy to anesthesia.
Anesthesia Type: 1% lidocaine with 1:100,000 epinephrine and a 1:6 solution of 8.4% sodium bicarbonate
Lab: [id number]
Biopsy Method: Personna blade
Wound Care: Aquaphor
Electrodesiccation And Curettage Text: The wound bed was treated with electrodesiccation and curettage after the biopsy was performed.
Electrodesiccation Text: The wound bed was treated with electrodesiccation after the biopsy was performed.
Hemostasis: Drysol and Electrocautery

## 2018-06-28 ENCOUNTER — APPOINTMENT (RX ONLY)
Dept: URBAN - METROPOLITAN AREA CLINIC 20 | Facility: CLINIC | Age: 83
Setting detail: DERMATOLOGY
End: 2018-06-28

## 2018-06-28 DIAGNOSIS — D22 MELANOCYTIC NEVI: ICD-10-CM

## 2018-06-28 PROBLEM — D22.61 MELANOCYTIC NEVI OF RIGHT UPPER LIMB, INCLUDING SHOULDER: Status: ACTIVE | Noted: 2018-06-28

## 2018-06-28 PROCEDURE — 12032 INTMD RPR S/A/T/EXT 2.6-7.5: CPT

## 2018-06-28 PROCEDURE — 11402 EXC TR-EXT B9+MARG 1.1-2 CM: CPT

## 2018-06-28 PROCEDURE — ? EXCISION

## 2018-06-28 ASSESSMENT — LOCATION DETAILED DESCRIPTION DERM: LOCATION DETAILED: RIGHT DISTAL POSTERIOR UPPER ARM

## 2018-06-28 ASSESSMENT — LOCATION SIMPLE DESCRIPTION DERM: LOCATION SIMPLE: RIGHT POSTERIOR UPPER ARM

## 2018-06-28 ASSESSMENT — LOCATION ZONE DERM: LOCATION ZONE: ARM

## 2018-06-28 NOTE — HPI: PROCEDURE (SKIN SURGERY)
Has The Growth Been Previously Biopsied?: has been previously biopsied
Body Location Override (Optional): Rt distal posterior upper arm

## 2018-06-28 NOTE — PROCEDURE: EXCISION
Complex Repair And Double M Plasty Text: The defect edges were debeveled with a #15 scalpel blade.  The primary defect was closed partially with a complex linear closure.  Given the location of the remaining defect, shape of the defect and the proximity to free margins a double M plasty was deemed most appropriate for complete closure of the defect.  Using a sterile surgical marker, an appropriate advancement flap was drawn incorporating the defect and placing the expected incisions within the relaxed skin tension lines where possible.    The area thus outlined was incised deep to adipose tissue with a #15 scalpel blade.  The skin margins were undermined to an appropriate distance in all directions utilizing iris scissors.
Surgeon (Optional): Janett Hall
Home Suture Removal Text: Patient was provided a home suture removal kit and will remove their sutures at home.  If they have any questions or difficulties they will call the office.
Epidermal Autograft Text: The defect edges were debeveled with a #15 scalpel blade.  Given the location of the defect, shape of the defect and the proximity to free margins an epidermal autograft was deemed most appropriate.  Using a sterile surgical marker, the primary defect shape was transferred to the donor site. The epidermal graft was then harvested.  The skin graft was then placed in the primary defect and oriented appropriately.
Paramedian Forehead Flap Text: A decision was made to reconstruct the defect utilizing an interpolation axial flap and a staged reconstruction.  A telfa template was made of the defect.  This telfa template was then used to outline the paramedian forehead pedicle flap.  The donor area for the pedicle flap was then injected with anesthesia.  The flap was excised through the skin and subcutaneous tissue down to the layer of the underlying musculature.  The pedicle flap was carefully excised within this deep plane to maintain its blood supply.  The edges of the donor site were undermined.   The donor site was closed in a primary fashion.  The pedicle was then rotated into position and sutured.  Once the tube was sutured into place, adequate blood supply was confirmed with blanching and refill.  The pedicle was then wrapped with xeroform gauze and dressed appropriately with a telfa and gauze bandage to ensure continued blood supply and protect the attached pedicle.
Include Z78.9 (Other Specified Conditions Influencing Health Status) As An Associated Diagnosis?: Yes
Complex Repair And V-Y Plasty Text: The defect edges were debeveled with a #15 scalpel blade.  The primary defect was closed partially with a complex linear closure.  Given the location of the remaining defect, shape of the defect and the proximity to free margins a V-Y plasty was deemed most appropriate for complete closure of the defect.  Using a sterile surgical marker, an appropriate advancement flap was drawn incorporating the defect and placing the expected incisions within the relaxed skin tension lines where possible.    The area thus outlined was incised deep to adipose tissue with a #15 scalpel blade.  The skin margins were undermined to an appropriate distance in all directions utilizing iris scissors.
Medical Necessity Information: It is in your best interest to select a reason for this procedure from the list below. All of these items fulfill various CMS LCD requirements except lesion extends to a margin.
Estimated Blood Loss (Cc): minimal
Transposition Flap Text: The defect edges were debeveled with a #15 scalpel blade.  Given the location of the defect and the proximity to free margins a transposition flap was deemed most appropriate.  Using a sterile surgical marker, an appropriate transposition flap was drawn incorporating the defect.    The area thus outlined was incised deep to adipose tissue with a #15 scalpel blade.  The skin margins were undermined to an appropriate distance in all directions utilizing iris scissors.
Excision Depth: adipose tissue
Posterior Auricular Interpolation Flap Text: A decision was made to reconstruct the defect utilizing an interpolation axial flap and a staged reconstruction.  A telfa template was made of the defect.  This telfa template was then used to outline the posterior auricular interpolation flap.  The donor area for the pedicle flap was then injected with anesthesia.  The flap was excised through the skin and subcutaneous tissue down to the layer of the underlying musculature.  The pedicle flap was carefully excised within this deep plane to maintain its blood supply.  The edges of the donor site were undermined.   The donor site was closed in a primary fashion.  The pedicle was then rotated into position and sutured.  Once the tube was sutured into place, adequate blood supply was confirmed with blanching and refill.  The pedicle was then wrapped with xeroform gauze and dressed appropriately with a telfa and gauze bandage to ensure continued blood supply and protect the attached pedicle.
Skin Substitute Text: The defect edges were debeveled with a #15 scalpel blade.  Given the location of the defect, shape of the defect and the proximity to free margins a skin substitute graft was deemed most appropriate.  The graft material was trimmed to fit the size of the defect. The graft was then placed in the primary defect and oriented appropriately.
Secondary Defect Length (In Cm): 0
Advancement Flap (Single) Text: The defect edges were debeveled with a #15 scalpel blade.  Given the location of the defect and the proximity to free margins a single advancement flap was deemed most appropriate.  Using a sterile surgical marker, an appropriate advancement flap was drawn incorporating the defect and placing the expected incisions within the relaxed skin tension lines where possible.    The area thus outlined was incised deep to adipose tissue with a #15 scalpel blade.  The skin margins were undermined to an appropriate distance in all directions utilizing iris scissors.
Complex Repair And Modified Advancement Flap Text: The defect edges were debeveled with a #15 scalpel blade.  The primary defect was closed partially with a complex linear closure.  Given the location of the remaining defect, shape of the defect and the proximity to free margins a modified advancement flap was deemed most appropriate for complete closure of the defect.  Using a sterile surgical marker, an appropriate advancement flap was drawn incorporating the defect and placing the expected incisions within the relaxed skin tension lines where possible.    The area thus outlined was incised deep to adipose tissue with a #15 scalpel blade.  The skin margins were undermined to an appropriate distance in all directions utilizing iris scissors.
Advancement Flap (Double) Text: The defect edges were debeveled with a #15 scalpel blade.  Given the location of the defect and the proximity to free margins a double advancement flap was deemed most appropriate.  Using a sterile surgical marker, the appropriate advancement flaps were drawn incorporating the defect and placing the expected incisions within the relaxed skin tension lines where possible.    The area thus outlined was incised deep to adipose tissue with a #15 scalpel blade.  The skin margins were undermined to an appropriate distance in all directions utilizing iris scissors.
Complex Repair And Ftsg Text: The defect edges were debeveled with a #15 scalpel blade.  The primary defect was closed partially with a complex linear closure.  Given the location of the defect, shape of the defect and the proximity to free margins a full thickness skin graft was deemed most appropriate to repair the remaining defect.  The graft was trimmed to fit the size of the remaining defect.  The graft was then placed in the primary defect, oriented appropriately, and sutured into place.
Epidermal Closure: running
Additional Anesthesia Volume In Cc: 3
Consent was obtained from the patient. The risks and benefits to therapy were discussed in detail. Specifically, the risks of infection, scarring, bleeding, prolonged wound healing, incomplete removal, allergy to anesthesia, nerve injury and recurrence were addressed. Prior to the procedure, the treatment site was clearly identified and confirmed by the patient. All components of Universal Protocol/PAUSE Rule completed.
Excisional Biopsy Additional Text (Leave Blank If You Do Not Want): The margin was drawn around the clinically apparent lesion. An elliptical shape was then drawn on the skin incorporating the lesion and margins.  Incisions were then made along these lines to the appropriate tissue plane and the lesion was extirpated.
Complex Repair And Tissue Cultured Epidermal Autograft Text: The defect edges were debeveled with a #15 scalpel blade.  The primary defect was closed partially with a complex linear closure.  Given the location of the defect, shape of the defect and the proximity to free margins an tissue cultured epidermal autograft was deemed most appropriate to repair the remaining defect.  The graft was trimmed to fit the size of the remaining defect.  The graft was then placed in the primary defect, oriented appropriately, and sutured into place.
Render The Repair Note As A Separate Paragraph: No
Dermal Autograft Text: The defect edges were debeveled with a #15 scalpel blade.  Given the location of the defect, shape of the defect and the proximity to free margins a dermal autograft was deemed most appropriate.  Using a sterile surgical marker, the primary defect shape was transferred to the donor site. The area thus outlined was incised deep to adipose tissue with a #15 scalpel blade.  The harvested graft was then trimmed of adipose and epidermal tissue until only dermis was left.  The skin graft was then placed in the primary defect and oriented appropriately.
Perilesional Excision Additional Text (Leave Blank If You Do Not Want): The margin was drawn around the clinically apparent lesion. Incisions were then made along these lines to the appropriate tissue plane and the lesion was extirpated.
Billing Type: Third-Party Bill
Excision Method: Elliptical
Lazy S Complex Repair Preamble Text (Leave Blank If You Do Not Want): Extensive wide undermining was performed.
Muscle Hinge Flap Text: The defect edges were debeveled with a #15 scalpel blade.  Given the size, depth and location of the defect and the proximity to free margins a muscle hinge flap was deemed most appropriate.  Using a sterile surgical marker, an appropriate hinge flap was drawn incorporating the defect. The area thus outlined was incised with a #15 scalpel blade.  The skin margins were undermined to an appropriate distance in all directions utilizing iris scissors.
Wound Care: Petrolatum
Deep Sutures: 3-0 Vicryl
Complex Repair And M Plasty Text: The defect edges were debeveled with a #15 scalpel blade.  The primary defect was closed partially with a complex linear closure.  Given the location of the remaining defect, shape of the defect and the proximity to free margins an M plasty was deemed most appropriate for complete closure of the defect.  Using a sterile surgical marker, an appropriate advancement flap was drawn incorporating the defect and placing the expected incisions within the relaxed skin tension lines where possible.    The area thus outlined was incised deep to adipose tissue with a #15 scalpel blade.  The skin margins were undermined to an appropriate distance in all directions utilizing iris scissors.
O-Z Plasty Text: The defect edges were debeveled with a #15 scalpel blade.  Given the location of the defect, shape of the defect and the proximity to free margins an O-Z plasty (double transposition flap) was deemed most appropriate.  Using a sterile surgical marker, the appropriate transposition flaps were drawn incorporating the defect and placing the expected incisions within the relaxed skin tension lines where possible.    The area thus outlined was incised deep to adipose tissue with a #15 scalpel blade.  The skin margins were undermined to an appropriate distance in all directions utilizing iris scissors.  Hemostasis was achieved with electrocautery.  The flaps were then transposed into place, one clockwise and the other counterclockwise, and anchored with interrupted buried subcutaneous sutures.
Island Pedicle Flap Text: The defect edges were debeveled with a #15 scalpel blade.  Given the location of the defect, shape of the defect and the proximity to free margins an island pedicle advancement flap was deemed most appropriate.  Using a sterile surgical marker, an appropriate advancement flap was drawn incorporating the defect, outlining the appropriate donor tissue and placing the expected incisions within the relaxed skin tension lines where possible.    The area thus outlined was incised deep to adipose tissue with a #15 scalpel blade.  The skin margins were undermined to an appropriate distance in all directions around the primary defect and laterally outward around the island pedicle utilizing iris scissors.  There was minimal undermining beneath the pedicle flap.
Modified Advancement Flap Text: The defect edges were debeveled with a #15 scalpel blade.  Given the location of the defect, shape of the defect and the proximity to free margins a modified advancement flap was deemed most appropriate.  Using a sterile surgical marker, an appropriate advancement flap was drawn incorporating the defect and placing the expected incisions within the relaxed skin tension lines where possible.    The area thus outlined was incised deep to adipose tissue with a #15 scalpel blade.  The skin margins were undermined to an appropriate distance in all directions utilizing iris scissors.
Eliptical Excision Additional Text (Leave Blank If You Do Not Want): The margin was drawn around the clinically apparent lesion.  An elliptical shape was then drawn on the skin incorporating the lesion and margins.  Incisions were then made along these lines to the appropriate tissue plane and the lesion was extirpated.
Purse String (Intermediate) Text: Given the location of the defect and the characteristics of the surrounding skin a purse string intermediate closure was deemed most appropriate.  Undermining was performed circumferentially around the surgical defect.  A purse string suture was then placed and tightened.
Complex Repair And Dermal Autograft Text: The defect edges were debeveled with a #15 scalpel blade.  The primary defect was closed partially with a complex linear closure.  Given the location of the defect, shape of the defect and the proximity to free margins an dermal autograft was deemed most appropriate to repair the remaining defect.  The graft was trimmed to fit the size of the remaining defect.  The graft was then placed in the primary defect, oriented appropriately, and sutured into place.
Size Of Lesion In Cm: 0.6
Dermal Closure: simple interrupted
Melolabial Interpolation Flap Text: A decision was made to reconstruct the defect utilizing an interpolation axial flap and a staged reconstruction.  A telfa template was made of the defect.  This telfa template was then used to outline the melolabial interpolation flap.  The donor area for the pedicle flap was then injected with anesthesia.  The flap was excised through the skin and subcutaneous tissue down to the layer of the underlying musculature.  The pedicle flap was carefully excised within this deep plane to maintain its blood supply.  The edges of the donor site were undermined.   The donor site was closed in a primary fashion.  The pedicle was then rotated into position and sutured.  Once the tube was sutured into place, adequate blood supply was confirmed with blanching and refill.  The pedicle was then wrapped with xeroform gauze and dressed appropriately with a telfa and gauze bandage to ensure continued blood supply and protect the attached pedicle.
Size Of Margin In Cm: 0.3
Crescentic Advancement Flap Text: The defect edges were debeveled with a #15 scalpel blade.  Given the location of the defect and the proximity to free margins a crescentic advancement flap was deemed most appropriate.  Using a sterile surgical marker, the appropriate advancement flap was drawn incorporating the defect and placing the expected incisions within the relaxed skin tension lines where possible.    The area thus outlined was incised deep to adipose tissue with a #15 scalpel blade.  The skin margins were undermined to an appropriate distance in all directions utilizing iris scissors.
Rotation Flap Text: The defect edges were debeveled with a #15 scalpel blade.  Given the location of the defect, shape of the defect and the proximity to free margins a rotation flap was deemed most appropriate.  Using a sterile surgical marker, an appropriate rotation flap was drawn incorporating the defect and placing the expected incisions within the relaxed skin tension lines where possible.    The area thus outlined was incised deep to adipose tissue with a #15 scalpel blade.  The skin margins were undermined to an appropriate distance in all directions utilizing iris scissors.
Mastoid Interpolation Flap Text: A decision was made to reconstruct the defect utilizing an interpolation axial flap and a staged reconstruction.  A telfa template was made of the defect.  This telfa template was then used to outline the mastoid interpolation flap.  The donor area for the pedicle flap was then injected with anesthesia.  The flap was excised through the skin and subcutaneous tissue down to the layer of the underlying musculature.  The pedicle flap was carefully excised within this deep plane to maintain its blood supply.  The edges of the donor site were undermined.   The donor site was closed in a primary fashion.  The pedicle was then rotated into position and sutured.  Once the tube was sutured into place, adequate blood supply was confirmed with blanching and refill.  The pedicle was then wrapped with xeroform gauze and dressed appropriately with a telfa and gauze bandage to ensure continued blood supply and protect the attached pedicle.
Burow's Advancement Flap Text: The defect edges were debeveled with a #15 scalpel blade.  Given the location of the defect and the proximity to free margins a Burow's advancement flap was deemed most appropriate.  Using a sterile surgical marker, the appropriate advancement flap was drawn incorporating the defect and placing the expected incisions within the relaxed skin tension lines where possible.    The area thus outlined was incised deep to adipose tissue with a #15 scalpel blade.  The skin margins were undermined to an appropriate distance in all directions utilizing iris scissors.
Post-Care Instructions: I reviewed with the patient in detail post-care instructions. Patient is not to engage in any heavy lifting, exercise, or swimming for the next 14 days. Should the patient develop any fevers, chills, bleeding, severe pain patient will contact the office immediately.
Lab: [id number]
Cheek-To-Nose Interpolation Flap Text: A decision was made to reconstruct the defect utilizing an interpolation axial flap and a staged reconstruction.  A telfa template was made of the defect.  This telfa template was then used to outline the Cheek-To-Nose Interpolation flap.  The donor area for the pedicle flap was then injected with anesthesia.  The flap was excised through the skin and subcutaneous tissue down to the layer of the underlying musculature.  The interpolation flap was carefully excised within this deep plane to maintain its blood supply.  The edges of the donor site were undermined.   The donor site was closed in a primary fashion.  The pedicle was then rotated into position and sutured.  Once the tube was sutured into place, adequate blood supply was confirmed with blanching and refill.  The pedicle was then wrapped with xeroform gauze and dressed appropriately with a telfa and gauze bandage to ensure continued blood supply and protect the attached pedicle.
Scalpel Size: 15 blade
Repair Performed By Another Provider Text (Leave Blank If You Do Not Want): After the tissue was excised the defect was repaired by another provider.
Double Island Pedicle Flap Text: The defect edges were debeveled with a #15 scalpel blade.  Given the location of the defect, shape of the defect and the proximity to free margins a double island pedicle advancement flap was deemed most appropriate.  Using a sterile surgical marker, an appropriate advancement flap was drawn incorporating the defect, outlining the appropriate donor tissue and placing the expected incisions within the relaxed skin tension lines where possible.    The area thus outlined was incised deep to adipose tissue with a #15 scalpel blade.  The skin margins were undermined to an appropriate distance in all directions around the primary defect and laterally outward around the island pedicle utilizing iris scissors.  There was minimal undermining beneath the pedicle flap.
S Plasty Text: Given the location and shape of the defect, and the orientation of relaxed skin tension lines, an S-plasty was deemed most appropriate for repair.  Using a sterile surgical marker, the appropriate outline of the S-plasty was drawn, incorporating the defect and placing the expected incisions within the relaxed skin tension lines where possible.  The area thus outlined was incised deep to adipose tissue with a #15 scalpel blade.  The skin margins were undermined to an appropriate distance in all directions utilizing iris scissors. The skin flaps were advanced over the defect.  The opposing margins were then approximated with interrupted buried subcutaneous sutures.
Complex Repair And Epidermal Autograft Text: The defect edges were debeveled with a #15 scalpel blade.  The primary defect was closed partially with a complex linear closure.  Given the location of the defect, shape of the defect and the proximity to free margins an epidermal autograft was deemed most appropriate to repair the remaining defect.  The graft was trimmed to fit the size of the remaining defect.  The graft was then placed in the primary defect, oriented appropriately, and sutured into place.
Previous Accession (Optional): [id number]
V-Y Flap Text: The defect edges were debeveled with a #15 scalpel blade.  Given the location of the defect, shape of the defect and the proximity to free margins a V-Y flap was deemed most appropriate.  Using a sterile surgical marker, an appropriate advancement flap was drawn incorporating the defect and placing the expected incisions within the relaxed skin tension lines where possible.    The area thus outlined was incised deep to adipose tissue with a #15 scalpel blade.  The skin margins were undermined to an appropriate distance in all directions utilizing iris scissors.
O-L Flap Text: The defect edges were debeveled with a #15 scalpel blade.  Given the location of the defect, shape of the defect and the proximity to free margins an O-L flap was deemed most appropriate.  Using a sterile surgical marker, an appropriate advancement flap was drawn incorporating the defect and placing the expected incisions within the relaxed skin tension lines where possible.    The area thus outlined was incised deep to adipose tissue with a #15 scalpel blade.  The skin margins were undermined to an appropriate distance in all directions utilizing iris scissors.
Epidermal Sutures: 4-0 Nylon
Intermediate Repair Preamble Text (Leave Blank If You Do Not Want): Undermining was performed with blunt dissection.
Hatchet Flap Text: The defect edges were debeveled with a #15 scalpel blade.  Given the location of the defect, shape of the defect and the proximity to free margins a hatchet flap was deemed most appropriate.  Using a sterile surgical marker, an appropriate hatchet flap was drawn incorporating the defect and placing the expected incisions within the relaxed skin tension lines where possible.    The area thus outlined was incised deep to adipose tissue with a #15 scalpel blade.  The skin margins were undermined to an appropriate distance in all directions utilizing iris scissors.
Interpolation Flap Text: A decision was made to reconstruct the defect utilizing an interpolation axial flap and a staged reconstruction.  A telfa template was made of the defect.  This telfa template was then used to outline the interpolation flap.  The donor area for the pedicle flap was then injected with anesthesia.  The flap was excised through the skin and subcutaneous tissue down to the layer of the underlying musculature.  The interpolation flap was carefully excised within this deep plane to maintain its blood supply.  The edges of the donor site were undermined.   The donor site was closed in a primary fashion.  The pedicle was then rotated into position and sutured.  Once the tube was sutured into place, adequate blood supply was confirmed with blanching and refill.  The pedicle was then wrapped with xeroform gauze and dressed appropriately with a telfa and gauze bandage to ensure continued blood supply and protect the attached pedicle.
Dressing: pressure dressing
Complex Repair And Rotation Flap Text: The defect edges were debeveled with a #15 scalpel blade.  The primary defect was closed partially with a complex linear closure.  Given the location of the remaining defect, shape of the defect and the proximity to free margins a rotation flap was deemed most appropriate for complete closure of the defect.  Using a sterile surgical marker, an appropriate advancement flap was drawn incorporating the defect and placing the expected incisions within the relaxed skin tension lines where possible.    The area thus outlined was incised deep to adipose tissue with a #15 scalpel blade.  The skin margins were undermined to an appropriate distance in all directions utilizing iris scissors.
Saucerization Excision Additional Text (Leave Blank If You Do Not Want): The margin was drawn around the clinically apparent lesion.  Incisions were then made along these lines, in a tangential fashion, to the appropriate tissue plane and the lesion was extirpated.
Complex Repair And Double Advancement Flap Text: The defect edges were debeveled with a #15 scalpel blade.  The primary defect was closed partially with a complex linear closure.  Given the location of the remaining defect, shape of the defect and the proximity to free margins a double advancement flap was deemed most appropriate for complete closure of the defect.  Using a sterile surgical marker, an appropriate advancement flap was drawn incorporating the defect and placing the expected incisions within the relaxed skin tension lines where possible.    The area thus outlined was incised deep to adipose tissue with a #15 scalpel blade.  The skin margins were undermined to an appropriate distance in all directions utilizing iris scissors.
Dorsal Nasal Flap Text: The defect edges were debeveled with a #15 scalpel blade.  Given the location of the defect and the proximity to free margins a dorsal nasal flap was deemed most appropriate.  Using a sterile surgical marker, an appropriate dorsal nasal flap was drawn around the defect.    The area thus outlined was incised deep to adipose tissue with a #15 scalpel blade.  The skin margins were undermined to an appropriate distance in all directions utilizing iris scissors.
Melolabial Transposition Flap Text: The defect edges were debeveled with a #15 scalpel blade.  Given the location of the defect and the proximity to free margins a melolabial flap was deemed most appropriate.  Using a sterile surgical marker, an appropriate melolabial transposition flap was drawn incorporating the defect.    The area thus outlined was incised deep to adipose tissue with a #15 scalpel blade.  The skin margins were undermined to an appropriate distance in all directions utilizing iris scissors.
Intermediate / Complex Repair - Final Wound Length In Cm: 3.6
Rhombic Flap Text: The defect edges were debeveled with a #15 scalpel blade.  Given the location of the defect and the proximity to free margins a rhombic flap was deemed most appropriate.  Using a sterile surgical marker, an appropriate rhombic flap was drawn incorporating the defect.    The area thus outlined was incised deep to adipose tissue with a #15 scalpel blade.  The skin margins were undermined to an appropriate distance in all directions utilizing iris scissors.
Anesthesia Type: 1% lidocaine with epinephrine and a 1:10 solution of 8.4% sodium bicarbonate
No Repair - Repaired With Adjacent Surgical Defect Text (Leave Blank If You Do Not Want): After the excision the defect was repaired concurrently with another surgical defect which was in close approximation.
Complex Repair And Xenograft Text: The defect edges were debeveled with a #15 scalpel blade.  The primary defect was closed partially with a complex linear closure.  Given the location of the defect, shape of the defect and the proximity to free margins a xenograft was deemed most appropriate to repair the remaining defect.  The graft was trimmed to fit the size of the remaining defect.  The graft was then placed in the primary defect, oriented appropriately, and sutured into place.
Purse String (Simple) Text: Given the location of the defect and the characteristics of the surrounding skin a purse string simple closure was deemed most appropriate.  Undermining was performed circumferentially around the surgical defect.  A purse string suture was then placed and tightened.
Repair Type: Intermediate
Number Of Deep Sutures (Optional): 5
Tissue Cultured Epidermal Autograft Text: The defect edges were debeveled with a #15 scalpel blade.  Given the location of the defect, shape of the defect and the proximity to free margins a tissue cultured epidermal autograft was deemed most appropriate.  The graft was then trimmed to fit the size of the defect.  The graft was then placed in the primary defect and oriented appropriately.
Complex Repair And O-T Advancement Flap Text: The defect edges were debeveled with a #15 scalpel blade.  The primary defect was closed partially with a complex linear closure.  Given the location of the remaining defect, shape of the defect and the proximity to free margins an O-T advancement flap was deemed most appropriate for complete closure of the defect.  Using a sterile surgical marker, an appropriate advancement flap was drawn incorporating the defect and placing the expected incisions within the relaxed skin tension lines where possible.    The area thus outlined was incised deep to adipose tissue with a #15 scalpel blade.  The skin margins were undermined to an appropriate distance in all directions utilizing iris scissors.
Hemostasis: Electrocautery
O-T Advancement Flap Text: The defect edges were debeveled with a #15 scalpel blade.  Given the location of the defect, shape of the defect and the proximity to free margins an O-T advancement flap was deemed most appropriate.  Using a sterile surgical marker, an appropriate advancement flap was drawn incorporating the defect and placing the expected incisions within the relaxed skin tension lines where possible.    The area thus outlined was incised deep to adipose tissue with a #15 scalpel blade.  The skin margins were undermined to an appropriate distance in all directions utilizing iris scissors.
H Plasty Text: Given the location of the defect, shape of the defect and the proximity to free margins a H-plasty was deemed most appropriate for repair.  Using a sterile surgical marker, the appropriate advancement arms of the H-plasty were drawn incorporating the defect and placing the expected incisions within the relaxed skin tension lines where possible. The area thus outlined was incised deep to adipose tissue with a #15 scalpel blade. The skin margins were undermined to an appropriate distance in all directions utilizing iris scissors.  The opposing advancement arms were then advanced into place in opposite direction and anchored with interrupted buried subcutaneous sutures.
Medical Necessity Clause: This procedure was medically necessary because the lesion that was treated was:
Cartilage Graft Text: The defect edges were debeveled with a #15 scalpel blade.  Given the location of the defect, shape of the defect, the fact the defect involved a full thickness cartilage defect a cartilage graft was deemed most appropriate.  An appropriate donor site was identified, cleansed, and anesthetized. The cartilage graft was then harvested and transferred to the recipient site, oriented appropriately and then sutured into place.  The secondary defect was then repaired using a primary closure.
A-T Advancement Flap Text: The defect edges were debeveled with a #15 scalpel blade.  Given the location of the defect, shape of the defect and the proximity to free margins an A-T advancement flap was deemed most appropriate.  Using a sterile surgical marker, an appropriate advancement flap was drawn incorporating the defect and placing the expected incisions within the relaxed skin tension lines where possible.    The area thus outlined was incised deep to adipose tissue with a #15 scalpel blade.  The skin margins were undermined to an appropriate distance in all directions utilizing iris scissors.
Graft Donor Site Bandage (Optional-Leave Blank If You Don't Want In Note): Steri-strips and a pressure bandage were applied to the donor site.
V-Y Plasty Text: The defect edges were debeveled with a #15 scalpel blade.  Given the location of the defect, shape of the defect and the proximity to free margins an V-Y advancement flap was deemed most appropriate.  Using a sterile surgical marker, an appropriate advancement flap was drawn incorporating the defect and placing the expected incisions within the relaxed skin tension lines where possible.    The area thus outlined was incised deep to adipose tissue with a #15 scalpel blade.  The skin margins were undermined to an appropriate distance in all directions utilizing iris scissors.
Complex Repair And Single Advancement Flap Text: The defect edges were debeveled with a #15 scalpel blade.  The primary defect was closed partially with a complex linear closure.  Given the location of the remaining defect, shape of the defect and the proximity to free margins a single advancement flap was deemed most appropriate for complete closure of the defect.  Using a sterile surgical marker, an appropriate advancement flap was drawn incorporating the defect and placing the expected incisions within the relaxed skin tension lines where possible.    The area thus outlined was incised deep to adipose tissue with a #15 scalpel blade.  The skin margins were undermined to an appropriate distance in all directions utilizing iris scissors.
Bilobed Flap Text: The defect edges were debeveled with a #15 scalpel blade.  Given the location of the defect and the proximity to free margins a bilobe flap was deemed most appropriate.  Using a sterile surgical marker, an appropriate bilobe flap drawn around the defect.    The area thus outlined was incised deep to adipose tissue with a #15 scalpel blade.  The skin margins were undermined to an appropriate distance in all directions utilizing iris scissors.
Complex Repair And Split-Thickness Skin Graft Text: The defect edges were debeveled with a #15 scalpel blade.  The primary defect was closed partially with a complex linear closure.  Given the location of the defect, shape of the defect and the proximity to free margins a split thickness skin graft was deemed most appropriate to repair the remaining defect.  The graft was trimmed to fit the size of the remaining defect.  The graft was then placed in the primary defect, oriented appropriately, and sutured into place.
Ftsg Text: The defect edges were debeveled with a #15 scalpel blade.  Given the location of the defect, shape of the defect and the proximity to free margins a full thickness skin graft was deemed most appropriate.  Using a sterile surgical marker, the primary defect shape was transferred to the donor site. The area thus outlined was incised deep to adipose tissue with a #15 scalpel blade.  The harvested graft was then trimmed of adipose tissue until only dermis and epidermis was left.  The skin margins of the secondary defect were undermined to an appropriate distance in all directions utilizing iris scissors.  The secondary defect was closed with interrupted buried subcutaneous sutures.  The skin edges were then re-apposed with running  sutures.  The skin graft was then placed in the primary defect and oriented appropriately.
Ear Star Wedge Flap Text: The defect edges were debeveled with a #15 blade scalpel.  Given the location of the defect and the proximity to free margins (helical rim) an ear star wedge flap was deemed most appropriate.  Using a sterile surgical marker, the appropriate flap was drawn incorporating the defect and placing the expected incisions between the helical rim and antihelix where possible.  The area thus outlined was incised through and through with a #15 scalpel blade.
Complex Repair And Z Plasty Text: The defect edges were debeveled with a #15 scalpel blade.  The primary defect was closed partially with a complex linear closure.  Given the location of the remaining defect, shape of the defect and the proximity to free margins a Z plasty was deemed most appropriate for complete closure of the defect.  Using a sterile surgical marker, an appropriate advancement flap was drawn incorporating the defect and placing the expected incisions within the relaxed skin tension lines where possible.    The area thus outlined was incised deep to adipose tissue with a #15 scalpel blade.  The skin margins were undermined to an appropriate distance in all directions utilizing iris scissors.
Trilobed Flap Text: The defect edges were debeveled with a #15 scalpel blade.  Given the location of the defect and the proximity to free margins a trilobed flap was deemed most appropriate.  Using a sterile surgical marker, an appropriate trilobed flap drawn around the defect.    The area thus outlined was incised deep to adipose tissue with a #15 scalpel blade.  The skin margins were undermined to an appropriate distance in all directions utilizing iris scissors.
Island Pedicle Flap-Requiring Vessel Identification Text: The defect edges were debeveled with a #15 scalpel blade.  Given the location of the defect, shape of the defect and the proximity to free margins an island pedicle advancement flap was deemed most appropriate.  Using a sterile surgical marker, an appropriate advancement flap was drawn, based on the axial vessel mentioned above, incorporating the defect, outlining the appropriate donor tissue and placing the expected incisions within the relaxed skin tension lines where possible.    The area thus outlined was incised deep to adipose tissue with a #15 scalpel blade.  The skin margins were undermined to an appropriate distance in all directions around the primary defect and laterally outward around the island pedicle utilizing iris scissors.  There was minimal undermining beneath the pedicle flap.
Slit Excision Additional Text (Leave Blank If You Do Not Want): A linear line was drawn on the skin overlying the lesion. An incision was made slowly until the lesion was visualized.  Once visualized, the lesion was removed with blunt dissection.
Cheek Interpolation Flap Text: A decision was made to reconstruct the defect utilizing an interpolation axial flap and a staged reconstruction.  A telfa template was made of the defect.  This telfa template was then used to outline the Cheek Interpolation flap.  The donor area for the pedicle flap was then injected with anesthesia.  The flap was excised through the skin and subcutaneous tissue down to the layer of the underlying musculature.  The interpolation flap was carefully excised within this deep plane to maintain its blood supply.  The edges of the donor site were undermined.   The donor site was closed in a primary fashion.  The pedicle was then rotated into position and sutured.  Once the tube was sutured into place, adequate blood supply was confirmed with blanching and refill.  The pedicle was then wrapped with xeroform gauze and dressed appropriately with a telfa and gauze bandage to ensure continued blood supply and protect the attached pedicle.
Star Wedge Flap Text: The defect edges were debeveled with a #15 scalpel blade.  Given the location of the defect, shape of the defect and the proximity to free margins a star wedge flap was deemed most appropriate.  Using a sterile surgical marker, an appropriate rotation flap was drawn incorporating the defect and placing the expected incisions within the relaxed skin tension lines where possible. The area thus outlined was incised deep to adipose tissue with a #15 scalpel blade.  The skin margins were undermined to an appropriate distance in all directions utilizing iris scissors.
Complex Repair And Dorsal Nasal Flap Text: The defect edges were debeveled with a #15 scalpel blade.  The primary defect was closed partially with a complex linear closure.  Given the location of the remaining defect, shape of the defect and the proximity to free margins a dorsal nasal flap was deemed most appropriate for complete closure of the defect.  Using a sterile surgical marker, an appropriate flap was drawn incorporating the defect and placing the expected incisions within the relaxed skin tension lines where possible.    The area thus outlined was incised deep to adipose tissue with a #15 scalpel blade.  The skin margins were undermined to an appropriate distance in all directions utilizing iris scissors.
Complex Repair And Rhombic Flap Text: The defect edges were debeveled with a #15 scalpel blade.  The primary defect was closed partially with a complex linear closure.  Given the location of the remaining defect, shape of the defect and the proximity to free margins a rhombic flap was deemed most appropriate for complete closure of the defect.  Using a sterile surgical marker, an appropriate advancement flap was drawn incorporating the defect and placing the expected incisions within the relaxed skin tension lines where possible.    The area thus outlined was incised deep to adipose tissue with a #15 scalpel blade.  The skin margins were undermined to an appropriate distance in all directions utilizing iris scissors.
Split-Thickness Skin Graft Text: The defect edges were debeveled with a #15 scalpel blade.  Given the location of the defect, shape of the defect and the proximity to free margins a split thickness skin graft was deemed most appropriate.  Using a sterile surgical marker, the primary defect shape was transferred to the donor site. The split thickness graft was then harvested.  The skin graft was then placed in the primary defect and oriented appropriately.
Complex Repair And Melolabial Flap Text: The defect edges were debeveled with a #15 scalpel blade.  The primary defect was closed partially with a complex linear closure.  Given the location of the remaining defect, shape of the defect and the proximity to free margins a melolabial flap was deemed most appropriate for complete closure of the defect.  Using a sterile surgical marker, an appropriate advancement flap was drawn incorporating the defect and placing the expected incisions within the relaxed skin tension lines where possible.    The area thus outlined was incised deep to adipose tissue with a #15 scalpel blade.  The skin margins were undermined to an appropriate distance in all directions utilizing iris scissors.
Date Of Previous Biopsy (Optional): 5/31/18
Bilateral Helical Rim Advancement Flap Text: The defect edges were debeveled with a #15 blade scalpel.  Given the location of the defect and the proximity to free margins (helical rim) a bilateral helical rim advancement flap was deemed most appropriate.  Using a sterile surgical marker, the appropriate advancement flaps were drawn incorporating the defect and placing the expected incisions between the helical rim and antihelix where possible.  The area thus outlined was incised through and through with a #15 scalpel blade.  With a skin hook and iris scissors, the flaps were gently and sharply undermined and freed up.
Island Pedicle Flap With Canthal Suspension Text: The defect edges were debeveled with a #15 scalpel blade.  Given the location of the defect, shape of the defect and the proximity to free margins an island pedicle advancement flap was deemed most appropriate.  Using a sterile surgical marker, an appropriate advancement flap was drawn incorporating the defect, outlining the appropriate donor tissue and placing the expected incisions within the relaxed skin tension lines where possible. The area thus outlined was incised deep to adipose tissue with a #15 scalpel blade.  The skin margins were undermined to an appropriate distance in all directions around the primary defect and laterally outward around the island pedicle utilizing iris scissors.  There was minimal undermining beneath the pedicle flap. A suspension suture was placed in the canthal tendon to prevent tension and prevent ectropion.
Detail Level: Detailed
Lab Facility: 
Keystone Flap Text: The defect edges were debeveled with a #15 scalpel blade.  Given the location of the defect, shape of the defect a keystone flap was deemed most appropriate.  Using a sterile surgical marker, an appropriate keystone flap was drawn incorporating the defect, outlining the appropriate donor tissue and placing the expected incisions within the relaxed skin tension lines where possible. The area thus outlined was incised deep to adipose tissue with a #15 scalpel blade.  The skin margins were undermined to an appropriate distance in all directions around the primary defect and laterally outward around the flap utilizing iris scissors.
Fusiform Excision Additional Text (Leave Blank If You Do Not Want): The margin was drawn around the clinically apparent lesion.  A fusiform shape was then drawn on the skin incorporating the lesion and margins.  Incisions were then made along these lines to the appropriate tissue plane and the lesion was extirpated.
Bi-Rhombic Flap Text: The defect edges were debeveled with a #15 scalpel blade.  Given the location of the defect and the proximity to free margins a bi-rhombic flap was deemed most appropriate.  Using a sterile surgical marker, an appropriate rhombic flap was drawn incorporating the defect. The area thus outlined was incised deep to adipose tissue with a #15 scalpel blade.  The skin margins were undermined to an appropriate distance in all directions utilizing iris scissors.
Mucosal Advancement Flap Text: Given the location of the defect, shape of the defect and the proximity to free margins a mucosal advancement flap was deemed most appropriate. Incisions were made with a 15 blade scalpel in the appropriate fashion along the cutaneous vermillion border and the mucosal lip. The remaining actinically damaged mucosal tissue was excised.  The mucosal advancement flap was then elevated to the gingival sulcus with care taken to preserve the neurovascular structures and advanced into the primary defect. Care was taken to ensure that precise realignment of the vermilion border was achieved.
Z Plasty Text: The lesion was extirpated to the level of the fat with a #15 scalpel blade.  Given the location of the defect, shape of the defect and the proximity to free margins a Z-plasty was deemed most appropriate for repair.  Using a sterile surgical marker, the appropriate transposition arms of the Z-plasty were drawn incorporating the defect and placing the expected incisions within the relaxed skin tension lines where possible.    The area thus outlined was incised deep to adipose tissue with a #15 scalpel blade.  The skin margins were undermined to an appropriate distance in all directions utilizing iris scissors.  The opposing transposition arms were then transposed into place in opposite direction and anchored with interrupted buried subcutaneous sutures.
Complex Repair And A-T Advancement Flap Text: The defect edges were debeveled with a #15 scalpel blade.  The primary defect was closed partially with a complex linear closure.  Given the location of the remaining defect, shape of the defect and the proximity to free margins an A-T advancement flap was deemed most appropriate for complete closure of the defect.  Using a sterile surgical marker, an appropriate advancement flap was drawn incorporating the defect and placing the expected incisions within the relaxed skin tension lines where possible.    The area thus outlined was incised deep to adipose tissue with a #15 scalpel blade.  The skin margins were undermined to an appropriate distance in all directions utilizing iris scissors.
W Plasty Text: The lesion was extirpated to the level of the fat with a #15 scalpel blade.  Given the location of the defect, shape of the defect and the proximity to free margins a W-plasty was deemed most appropriate for repair.  Using a sterile surgical marker, the appropriate transposition arms of the W-plasty were drawn incorporating the defect and placing the expected incisions within the relaxed skin tension lines where possible.    The area thus outlined was incised deep to adipose tissue with a #15 scalpel blade.  The skin margins were undermined to an appropriate distance in all directions utilizing iris scissors.  The opposing transposition arms were then transposed into place in opposite direction and anchored with interrupted buried subcutaneous sutures.
Complex Repair And O-L Flap Text: The defect edges were debeveled with a #15 scalpel blade.  The primary defect was closed partially with a complex linear closure.  Given the location of the remaining defect, shape of the defect and the proximity to free margins an O-L flap was deemed most appropriate for complete closure of the defect.  Using a sterile surgical marker, an appropriate flap was drawn incorporating the defect and placing the expected incisions within the relaxed skin tension lines where possible.    The area thus outlined was incised deep to adipose tissue with a #15 scalpel blade.  The skin margins were undermined to an appropriate distance in all directions utilizing iris scissors.
Lip Wedge Excision Repair Text: Given the location of the defect and the proximity to free margins a full thickness wedge repair was deemed most appropriate.  Using a sterile surgical marker, the appropriate repair was drawn incorporating the defect and placing the expected incisions perpendicular to the vermilion border.  The vermilion border was also meticulously outlined to ensure appropriate reapproximation during the repair.  The area thus outlined was incised through and through with a #15 scalpel blade.  The muscularis and dermis were reaproximated with deep sutures following hemostasis. Care was taken to realign the vermilion border before proceeding with the superficial closure.  Once the vermilion was realigned the superfical and mucosal closure was finished.
Complex Repair And Transposition Flap Text: The defect edges were debeveled with a #15 scalpel blade.  The primary defect was closed partially with a complex linear closure.  Given the location of the remaining defect, shape of the defect and the proximity to free margins a transposition flap was deemed most appropriate for complete closure of the defect.  Using a sterile surgical marker, an appropriate advancement flap was drawn incorporating the defect and placing the expected incisions within the relaxed skin tension lines where possible.    The area thus outlined was incised deep to adipose tissue with a #15 scalpel blade.  The skin margins were undermined to an appropriate distance in all directions utilizing iris scissors.
Spiral Flap Text: The defect edges were debeveled with a #15 scalpel blade.  Given the location of the defect, shape of the defect and the proximity to free margins a spiral flap was deemed most appropriate.  Using a sterile surgical marker, an appropriate rotation flap was drawn incorporating the defect and placing the expected incisions within the relaxed skin tension lines where possible. The area thus outlined was incised deep to adipose tissue with a #15 scalpel blade.  The skin margins were undermined to an appropriate distance in all directions utilizing iris scissors.
O-T Plasty Text: The defect edges were debeveled with a #15 scalpel blade.  Given the location of the defect, shape of the defect and the proximity to free margins an O-T plasty was deemed most appropriate.  Using a sterile surgical marker, an appropriate O-T plasty was drawn incorporating the defect and placing the expected incisions within the relaxed skin tension lines where possible.    The area thus outlined was incised deep to adipose tissue with a #15 scalpel blade.  The skin margins were undermined to an appropriate distance in all directions utilizing iris scissors.
Helical Rim Advancement Flap Text: The defect edges were debeveled with a #15 blade scalpel.  Given the location of the defect and the proximity to free margins (helical rim) a double helical rim advancement flap was deemed most appropriate.  Using a sterile surgical marker, the appropriate advancement flaps were drawn incorporating the defect and placing the expected incisions between the helical rim and antihelix where possible.  The area thus outlined was incised through and through with a #15 scalpel blade.  With a skin hook and iris scissors, the flaps were gently and sharply undermined and freed up.
Suture Removal: 11 days
Bilobed Transposition Flap Text: The defect edges were debeveled with a #15 scalpel blade.  Given the location of the defect and the proximity to free margins a bilobed transposition flap was deemed most appropriate.  Using a sterile surgical marker, an appropriate bilobe flap drawn around the defect.    The area thus outlined was incised deep to adipose tissue with a #15 scalpel blade.  The skin margins were undermined to an appropriate distance in all directions utilizing iris scissors.
Alar Island Pedicle Flap Text: The defect edges were debeveled with a #15 scalpel blade.  Given the location of the defect, shape of the defect and the proximity to the alar rim an island pedicle advancement flap was deemed most appropriate.  Using a sterile surgical marker, an appropriate advancement flap was drawn incorporating the defect, outlining the appropriate donor tissue and placing the expected incisions within the nasal ala running parallel to the alar rim. The area thus outlined was incised with a #15 scalpel blade.  The skin margins were undermined minimally to an appropriate distance in all directions around the primary defect and laterally outward around the island pedicle utilizing iris scissors.  There was minimal undermining beneath the pedicle flap.
Path Notes (To The Dermatopathologist): Please check margins.
Complex Repair And Skin Substitute Graft Text: The defect edges were debeveled with a #15 scalpel blade.  The primary defect was closed partially with a complex linear closure.  Given the location of the remaining defect, shape of the defect and the proximity to free margins a skin substitute graft was deemed most appropriate to repair the remaining defect.  The graft was trimmed to fit the size of the remaining defect.  The graft was then placed in the primary defect, oriented appropriately, and sutured into place.
Complex Repair And Bilobe Flap Text: The defect edges were debeveled with a #15 scalpel blade.  The primary defect was closed partially with a complex linear closure.  Given the location of the remaining defect, shape of the defect and the proximity to free margins a bilobe flap was deemed most appropriate for complete closure of the defect.  Using a sterile surgical marker, an appropriate advancement flap was drawn incorporating the defect and placing the expected incisions within the relaxed skin tension lines where possible.    The area thus outlined was incised deep to adipose tissue with a #15 scalpel blade.  The skin margins were undermined to an appropriate distance in all directions utilizing iris scissors.
Composite Graft Text: The defect edges were debeveled with a #15 scalpel blade.  Given the location of the defect, shape of the defect, the proximity to free margins and the fact the defect was full thickness a composite graft was deemed most appropriate.  The defect was outline and then transferred to the donor site.  A full thickness graft was then excised from the donor site. The graft was then placed in the primary defect, oriented appropriately and then sutured into place.  The secondary defect was then repaired using a primary closure.
Complex Repair And W Plasty Text: The defect edges were debeveled with a #15 scalpel blade.  The primary defect was closed partially with a complex linear closure.  Given the location of the remaining defect, shape of the defect and the proximity to free margins a W plasty was deemed most appropriate for complete closure of the defect.  Using a sterile surgical marker, an appropriate advancement flap was drawn incorporating the defect and placing the expected incisions within the relaxed skin tension lines where possible.    The area thus outlined was incised deep to adipose tissue with a #15 scalpel blade.  The skin margins were undermined to an appropriate distance in all directions utilizing iris scissors.
Xenograft Text: The defect edges were debeveled with a #15 scalpel blade.  Given the location of the defect, shape of the defect and the proximity to free margins a xenograft was deemed most appropriate.  The graft was then trimmed to fit the size of the defect.  The graft was then placed in the primary defect and oriented appropriately.

## 2018-07-09 ENCOUNTER — APPOINTMENT (RX ONLY)
Dept: URBAN - METROPOLITAN AREA CLINIC 20 | Facility: CLINIC | Age: 83
Setting detail: DERMATOLOGY
End: 2018-07-09

## 2018-07-09 DIAGNOSIS — Z48.02 ENCOUNTER FOR REMOVAL OF SUTURES: ICD-10-CM

## 2018-07-09 PROCEDURE — ? SUTURE REMOVAL (NO GLOBAL PERIOD)

## 2018-07-09 ASSESSMENT — LOCATION DETAILED DESCRIPTION DERM: LOCATION DETAILED: RIGHT PROXIMAL POSTERIOR UPPER ARM

## 2018-07-09 ASSESSMENT — LOCATION ZONE DERM: LOCATION ZONE: ARM

## 2018-07-09 ASSESSMENT — LOCATION SIMPLE DESCRIPTION DERM: LOCATION SIMPLE: RIGHT POSTERIOR UPPER ARM

## 2018-07-09 NOTE — PROCEDURE: SUTURE REMOVAL (NO GLOBAL PERIOD)
Body Location Override (Optional - Billing Will Still Be Based On Selected Body Map Location If Applicable): Rt distal posterior upper arm
Detail Level: Detailed
currently off all pressors and maintaining MAP 65-70
Related to cardiac arrest  Resolved now and patient is off Pressors

## 2018-08-14 ENCOUNTER — TELEPHONE (OUTPATIENT)
Dept: MEDICAL GROUP | Facility: MEDICAL CENTER | Age: 83
End: 2018-08-14

## 2018-08-14 NOTE — TELEPHONE ENCOUNTER
Future Appointments       Provider Department Center    8/17/2018 9:00 AM MAIN LAB [facility] MAIN LAB [facility]     8/21/2018 11:00 AM Doug Lewis M.D.; [facility]      ANNUAL WELLNESS VISIT PRE-VISIT PLANNING WITHOUT OUTREACH    1.  Reviewed note from last office visit with PCP: YES    2.  If any orders were placed at last visit, do we have Results/Consult Notes?        •  Labs - Labs ordered, NOT completed. Patient advised to complete prior to next appointment.  Note: If patient appointment is for lab review and patient did not complete labs, check with provider if OK to reschedule patient until labs completed.       •  Imaging - Imaging was not ordered at last office visit.       •  Referrals - Referral ordered, patient has NOT been seen.    3.  Immunizations were updated in [facility] using WebIZ?: Yes       •  WebIZ Recommendations: SHINGRIX (Shingles)        •  Is patient due for Tdap? NO       •  Is patient due for Shingles?     4.  Patient is due for the following Health Maintenance Topics:   Health Maintenance Due   Topic Date Due   • IMM ZOSTER VACCINES (2 of 3) 11/18/2016         5.  Reviewed/Updated the following with patient:       •   Preferred Pharmacy? YES       •   Preferred Lab? YES       •   Preferred Communication? YES       •   Allergies? YES       •   Medications? YES. Was Abstract Encounter opened and chart updated? YES       •   Social History? YES. Was Abstract Encounter opened and chart updated? YES       •   Family History (document living status of immediate family members and if + hx of  cancer, diabetes, hypertension, hyperlipidemia, heart attack, stroke) YES. Was Abstract Encounter opened and chart updated? YES    6.  Care Team Updated:       •   DME Company (gait device, O2, CPAP, etc.): N\A       •   Other Specialists (eye doctor, derm, GYN, cardiology, endo, etc): YES    7.  Patient has the following Care Path diagnoses on  Problem List:  NONE    8.  MDX printed and highlighted for Provider? NO    9.  Patient was advised: “This is a free wellness visit. The provider will screen for medical conditions to help you stay healthy. If you have other concerns to address you may be asked to discuss these at a separate visit or there may be an additional fee.”     10.  Patient was informed to arrive 15 min prior to their scheduled appointment and bring in their medication bottles.

## 2018-08-17 ENCOUNTER — HOSPITAL ENCOUNTER (OUTPATIENT)
Dept: LAB | Facility: MEDICAL CENTER | Age: 83
End: 2018-08-17
Attending: FAMILY MEDICINE
Payer: MEDICARE

## 2018-08-17 DIAGNOSIS — Z13.6 ENCOUNTER FOR LIPID SCREENING FOR CARDIOVASCULAR DISEASE: ICD-10-CM

## 2018-08-17 DIAGNOSIS — Z13.220 ENCOUNTER FOR LIPID SCREENING FOR CARDIOVASCULAR DISEASE: ICD-10-CM

## 2018-08-17 DIAGNOSIS — Z00.00 MEDICARE ANNUAL WELLNESS VISIT, SUBSEQUENT: ICD-10-CM

## 2018-08-17 LAB
ALBUMIN SERPL BCP-MCNC: 4.3 G/DL (ref 3.2–4.9)
ALBUMIN/GLOB SERPL: 1.5 G/DL
ALP SERPL-CCNC: 118 U/L (ref 30–99)
ALT SERPL-CCNC: 19 U/L (ref 2–50)
ANION GAP SERPL CALC-SCNC: 8 MMOL/L (ref 0–11.9)
AST SERPL-CCNC: 22 U/L (ref 12–45)
BILIRUB SERPL-MCNC: 0.6 MG/DL (ref 0.1–1.5)
BUN SERPL-MCNC: 15 MG/DL (ref 8–22)
CALCIUM SERPL-MCNC: 9.5 MG/DL (ref 8.5–10.5)
CHLORIDE SERPL-SCNC: 108 MMOL/L (ref 96–112)
CHOLEST SERPL-MCNC: 277 MG/DL (ref 100–199)
CO2 SERPL-SCNC: 27 MMOL/L (ref 20–33)
CREAT SERPL-MCNC: 0.87 MG/DL (ref 0.5–1.4)
GLOBULIN SER CALC-MCNC: 2.8 G/DL (ref 1.9–3.5)
GLUCOSE SERPL-MCNC: 107 MG/DL (ref 65–99)
HDLC SERPL-MCNC: 73 MG/DL
LDLC SERPL CALC-MCNC: 185 MG/DL
POTASSIUM SERPL-SCNC: 4.6 MMOL/L (ref 3.6–5.5)
PROT SERPL-MCNC: 7.1 G/DL (ref 6–8.2)
SODIUM SERPL-SCNC: 143 MMOL/L (ref 135–145)
TRIGL SERPL-MCNC: 93 MG/DL (ref 0–149)

## 2018-08-17 PROCEDURE — 80061 LIPID PANEL: CPT

## 2018-08-17 PROCEDURE — 36415 COLL VENOUS BLD VENIPUNCTURE: CPT

## 2018-08-17 PROCEDURE — 80053 COMPREHEN METABOLIC PANEL: CPT

## 2018-08-21 ENCOUNTER — OFFICE VISIT (OUTPATIENT)
Dept: MEDICAL GROUP | Facility: MEDICAL CENTER | Age: 83
End: 2018-08-21
Payer: MEDICARE

## 2018-08-21 VITALS
SYSTOLIC BLOOD PRESSURE: 144 MMHG | OXYGEN SATURATION: 97 % | BODY MASS INDEX: 22.09 KG/M2 | TEMPERATURE: 98.4 F | HEIGHT: 64 IN | HEART RATE: 74 BPM | WEIGHT: 129.41 LBS | RESPIRATION RATE: 16 BRPM | DIASTOLIC BLOOD PRESSURE: 70 MMHG

## 2018-08-21 DIAGNOSIS — R01.1 SYSTOLIC MURMUR: ICD-10-CM

## 2018-08-21 DIAGNOSIS — E78.00 PURE HYPERCHOLESTEROLEMIA: ICD-10-CM

## 2018-08-21 DIAGNOSIS — Z91.81 RISK FOR FALLS: ICD-10-CM

## 2018-08-21 DIAGNOSIS — R73.03 PREDIABETES: ICD-10-CM

## 2018-08-21 DIAGNOSIS — Z98.41 H/O BILATERAL CATARACT EXTRACTION: ICD-10-CM

## 2018-08-21 DIAGNOSIS — R03.0 ELEVATED BLOOD PRESSURE, SITUATIONAL: ICD-10-CM

## 2018-08-21 DIAGNOSIS — Z00.00 MEDICARE ANNUAL WELLNESS VISIT, SUBSEQUENT: Primary | ICD-10-CM

## 2018-08-21 DIAGNOSIS — M81.0 OSTEOPOROSIS, UNSPECIFIED OSTEOPOROSIS TYPE, UNSPECIFIED PATHOLOGICAL FRACTURE PRESENCE: ICD-10-CM

## 2018-08-21 DIAGNOSIS — Z23 NEED FOR VACCINATION: ICD-10-CM

## 2018-08-21 DIAGNOSIS — Z98.42 H/O BILATERAL CATARACT EXTRACTION: ICD-10-CM

## 2018-08-21 PROBLEM — Z78.0 POSTMENOPAUSAL ESTROGEN DEFICIENCY: Status: RESOLVED | Noted: 2017-06-29 | Resolved: 2018-08-21

## 2018-08-21 PROBLEM — R11.0 CHRONIC NAUSEA: Status: RESOLVED | Noted: 2018-05-17 | Resolved: 2018-08-21

## 2018-08-21 PROBLEM — S72.142D CLOSED DISPLACED INTERTROCHANTERIC FRACTURE OF LEFT FEMUR WITH ROUTINE HEALING: Status: RESOLVED | Noted: 2017-05-29 | Resolved: 2018-08-21

## 2018-08-21 PROBLEM — N30.00 ACUTE CYSTITIS WITHOUT HEMATURIA: Status: RESOLVED | Noted: 2018-04-30 | Resolved: 2018-08-21

## 2018-08-21 PROBLEM — M25.562 ACUTE PAIN OF LEFT KNEE: Status: RESOLVED | Noted: 2017-11-07 | Resolved: 2018-08-21

## 2018-08-21 PROCEDURE — G0439 PPPS, SUBSEQ VISIT: HCPCS | Performed by: FAMILY MEDICINE

## 2018-08-21 PROCEDURE — 99213 OFFICE O/P EST LOW 20 MIN: CPT | Mod: 25 | Performed by: FAMILY MEDICINE

## 2018-08-21 ASSESSMENT — ACTIVITIES OF DAILY LIVING (ADL): BATHING_REQUIRES_ASSISTANCE: 0

## 2018-08-21 ASSESSMENT — ENCOUNTER SYMPTOMS: GENERAL WELL-BEING: GOOD

## 2018-08-21 ASSESSMENT — PATIENT HEALTH QUESTIONNAIRE - PHQ9: CLINICAL INTERPRETATION OF PHQ2 SCORE: 0

## 2018-08-21 NOTE — ASSESSMENT & PLAN NOTE
Patient and I discussed recent labs (see below; pure hypercholesterolemia, uncontrolled, worsening) and that ASCVD risk is increased based on most recent lipid panel, current blood pressure (non-hypertensive), diabetes status (prediabetic), and smoking status (non-smoker).    Patient and I then discussed necessary dietary changes to make to address dyslipidemia.  Patient is NOT currently taking cholesterol lowering medication.  We discussed that patient eating butter and coconut oil is not helping her LDL elevation, and patient also needs to decrease cheese intake.  Patient otherwise states that she is unsure what may also be evaluating her cholesterol levels.  Patient advised to take dietary history over the next week, then to present with that log.  Patient verbalized understanding.    ROS is NEGATIVE for dizziness, generalized weakness/fatigue, vision/hearing changes, jaw pain/paresthesias, BUE pain/paresthesias/numbness/weakness, chest pain/pressure, palpitations, dyspnea, RUQ abdominal pain, oliguria/anuria, BLE edema.    Lab Results   Component Value Date/Time    CHOLSTRLTOT 277 (H) 08/17/2018 09:08 AM     (H) 08/17/2018 09:08 AM    HDL 73 08/17/2018 09:08 AM    TRIGLYCERIDE 93 08/17/2018 09:08 AM

## 2018-08-21 NOTE — PROGRESS NOTES
Chief Complaint   Patient presents with   • Annual Exam         HPI:  Char is a 88 y.o. here for Medicare Annual Wellness Visit    Pure hypercholesterolemia  Patient and I discussed recent labs (see below; pure hypercholesterolemia, uncontrolled, worsening) and that ASCVD risk is increased based on most recent lipid panel, current blood pressure (non-hypertensive), diabetes status (prediabetic), and smoking status (non-smoker).    Patient and I then discussed necessary dietary changes to make to address dyslipidemia.  Patient is NOT currently taking cholesterol lowering medication.  We discussed that patient eating butter and coconut oil is not helping her LDL elevation, and patient also needs to decrease cheese intake.  Patient otherwise states that she is unsure what may also be evaluating her cholesterol levels.  Patient advised to take dietary history over the next week, then to present with that log.  Patient verbalized understanding.    ROS is NEGATIVE for dizziness, generalized weakness/fatigue, vision/hearing changes, jaw pain/paresthesias, BUE pain/paresthesias/numbness/weakness, chest pain/pressure, palpitations, dyspnea, RUQ abdominal pain, oliguria/anuria, BLE edema.    Lab Results   Component Value Date/Time    CHOLSTRLTOT 277 (H) 08/17/2018 09:08 AM     (H) 08/17/2018 09:08 AM    HDL 73 08/17/2018 09:08 AM    TRIGLYCERIDE 93 08/17/2018 09:08 AM       Elevated blood pressure, situational  Chronic, uncontrolled, asymptomatic.  Patient is not taking blood pressure medication.  Patient states that she walks at least 45-60 minutes per day at a pace of 2.2 mph.  Patient advised that this is not enough to make her have good cardiovascular exercise (i.e. Not at moderate to high intensities).    ROS is NEGATIVE for dizziness, generalized weakness/fatigue, cold sweats,  vision/hearing changes, jaw pain/paresthesias, BUE pain/paresthesias/numbness/weakness, chest pain/pressure, palpitations, dyspnea,  nausea, RUQ abdominal pain, oliguria/anuria, BLE edema.    Osteoporosis  Chronic, uncontrolled, patient does have weightbearing exercise.  Patient also advised to take vitamin D and calcium supplementation, i.e. that in a multivitamin.  Patient has not had 2 or more falls within the last calendar year.  However, patient does have history of femur fracture within the last year.    Risk for falls  Patient at increased risk for falls, which is exacerbated by the fact the patient also has osteoporosis.  Please see notes from same date of service 8/21/2018 regarding osteoporosis.    Prediabetes  We discussed that patient is diagnosed with prediabetes, uncontrolled, given that the A1c is:   Lab Results   Component Value Date/Time    HBA1C 5.7 (H) 05/30/2017 07:08 PM        Patient is currently taking (no medication) for glycemic control, and (no medicatoin) for health maintenance related to diabetes mellitus.    We discussed that prediabetes/diabetes mellitus type 2 are medical conditions of lifestyle habits (less than optimal dietary choices, insufficient cardiovascular exercise), and that they can be controlled (in part or in whole) by these lifestyle changes.     Furthermore, we discussed that at present time it is better to pursue lifestyle changes rather than starting medications. Patient is  in agreement.     Please see assessment/plan as below for further details.    ROS is NEGATIVE for blurred vision, polydipsia, polyuria, diaphoresis, palpitations, fatigue, irritability, flank pain, BLE paresthesias.    Systolic murmur  This problem is chronic, stable, and monitored appropriately by history/labs/imaging as needed, and is well-controlled on the current regimen.  Please continue current regimen    H/O bilateral cataract extraction  This problem is chronic, stable, and monitored appropriately by history/labs/imaging as needed, and is well-controlled on the current regimen.  Please continue current  regimen      Patient Active Problem List    Diagnosis Date Noted   • Risk for falls 08/21/2018   • Prediabetes 06/29/2017   • Systolic murmur 06/29/2017   • Osteoporosis 08/24/2016   • Elevated blood pressure, situational 08/24/2016   • Pure hypercholesterolemia 08/24/2016   • H/O bilateral cataract extraction 08/24/2016       Current Outpatient Prescriptions   Medication Sig Dispense Refill   • Zoster Vac Recomb Adjuvanted (SHINGRIX) 50 MCG Recon Susp 0.5 mL by Intramuscular route Once for 1 dose. 0.5 mL 1     No current facility-administered medications for this visit.         Patient is taking medications as noted in medication list.  Current supplements as per medication list.     Allergies: Soy allergy    Current social contact/activities: Spends time with daughter and friends.     Is patient current with immunizations? No, due for SHINGRIX (Shingles). Patient is interested in receiving RX for shingrix  today.    She  reports that she has never smoked. She has never used smokeless tobacco. She reports that she does not drink alcohol or use drugs.  Counseling given: Not Answered        DPA/Advanced directive: Patient declined to bring in advance directive.    ROS:    Gait: Uses no assistive device   Ostomy: No   Other tubes: No   Amputations: No   Chronic oxygen use No   Last eye exam 11/2018   Wears hearing aids: No   : Reports urinary leakage during the last 6 months that has not interfered at all with their daily activities or sleep.      Depression Screening    Little interest or pleasure in doing things?  0 - not at all  Feeling down, depressed, or hopeless? 0 - not at all  Patient Health Questionnaire Score: 0    If depressive symptoms identified deferred to follow up visit unless specifically addressed in assessment and plan.    Interpretation of PHQ-9 Total Score   Score Severity   1-4 No Depression   5-9 Mild Depression   10-14 Moderate Depression   15-19 Moderately Severe Depression   20-27 Severe  Depression    Screening for Cognitive Impairment    Three Minute Recall (leader, season, table)  3/3 (leader, season, table)  Otto clock face with all 12 numbers and set the hands to show 10 past 11.  Yes 5/5  If cognitive concerns identified, deferred for follow up unless specifically addressed in assessment and plan.    Fall Risk Assessment    Has the patient had two or more falls in the last year or any fall with injury in the last year?  Yes  If fall risk identified, deferred for follow up unless specifically addressed in assessment and plan.    Safety Assessment    Throw rugs on floor.  Yes  Handrails on all stairs.  Yes  Good lighting in all hallways.  Yes  Difficulty hearing.  No  Patient counseled about all safety risks that were identified.    Functional Assessment ADLs    Are there any barriers preventing you from cooking for yourself or meeting nutritional needs?  No.    Are there any barriers preventing you from driving safely or obtaining transportation?  No.    Are there any barriers preventing you from using a telephone or calling for help?  No.    Are there any barriers preventing you from shopping?  No.    Are there any barriers preventing you from taking care of your own finances?  No.    Are there any barriers preventing you from managing your medications?  No.    Are there any barriers preventing you from showering, bathing or dressing yourself?  No.    Are you currently engaging in any exercise or physical activity?  Yes.  Walk treadmill dily 2-3 miles, light weights 3 x week  What is your perception of your health?  Good.    Health Maintenance Summary                IMM ZOSTER VACCINES Overdue 11/18/2016      Done 9/23/2016 Imm Admin: Zoster Vaccine Live (ZVL) (Zostavax)    IMM INFLUENZA Next Due 9/1/2018      Done 9/30/2017 Imm Admin: Influenza Vaccine Adult HD     Patient has more history with this topic...    BONE DENSITY Next Due 7/12/2022      Done 7/12/2017 DS-BONE DENSITY STUDY (DEXA)     "IMM DTaP/Tdap/Td Vaccine Next Due 3/22/2024      Done 3/22/2014 Imm Admin: Tdap Vaccine     Patient has more history with this topic...          Patient Care Team:  Doug Lewis M.D. as PCP - General (Family Medicine)    Social History   Substance Use Topics   • Smoking status: Never Smoker   • Smokeless tobacco: Never Used   • Alcohol use No     Family History   Problem Relation Age of Onset   • Family history unknown: Yes     She  has a past medical history of Allergy and Cataract.   Past Surgical History:   Procedure Laterality Date   • HIP NAILING INTRAMEDULLARY Left 5/30/2017    Procedure: HIP NAILING INTRAMEDULLARY;  Surgeon: Zion Pimentel M.D.;  Location: SURGERY [facility];  Service:    • MAMMOPLASTY AUGMENTATION  age 30's           Exam:     Blood pressure 144/70, pulse 74, temperature 36.9 °C (98.4 °F), resp. rate 16, height 1.626 m (5' 4\"), weight 58.7 kg (129 lb 6.6 oz), SpO2 97 %. Body mass index is 22.21 kg/m².    Hearing excellent.    Dentition good  Alert, oriented in no acute distress.  Eye contact is good, speech goal directed, affect calm      Assessment and Plan. The following treatment and monitoring plan is recommended:    1. Medicare annual wellness visit, subsequent  WI ANNUAL WELLNESS VISIT-INCLUDES PPPS SUBSEQUE*   2. Pure hypercholesterolemia  WI ANNUAL WELLNESS VISIT-INCLUDES PPPS SUBSEQUE*    LIPID PROFILE   3. Elevated blood pressure, situational  WI ANNUAL WELLNESS VISIT-INCLUDES PPPS SUBSEQUE*   4. Osteoporosis, unspecified osteoporosis type, unspecified pathological fracture presence  WI ANNUAL WELLNESS VISIT-INCLUDES PPPS SUBSEQUE*   5. Risk for falls  Patient identified as fall risk.  Appropriate orders and counseling given.    WI ANNUAL WELLNESS VISIT-INCLUDES PPPS SUBSEQUE*   6. Prediabetes  WI ANNUAL WELLNESS VISIT-INCLUDES PPPS SUBSEQUE*   7. Systolic murmur  WI ANNUAL WELLNESS VISIT-INCLUDES PPPS SUBSEQUE*   8. H/O bilateral cataract extraction  WI ANNUAL " WELLNESS VISIT-INCLUDES PPPS SUBSEQUE*   9. Need for vaccination  Zoster Vac Recomb Adjuvanted (SHINGRIX) 50 MCG Recon Susp    DISCONTINUED: Zoster Vac Recomb Adjuvanted (SHINGRIX) 50 MCG Recon Susp     2) Pure Hypercholesterolemia    - Patient asked to decrease the amount of fats that she consumes her diet, in particular coconut oil, butter, cheese    - Patient to keep food diary over the next week, with instructions written on the blood pressure log form    - Patient to get cholesterol labs in ~1month    3) Elevated blood pressure    -Patient to record blood pressures twice daily, and to bring in this form after 2 weeks    Services suggested: No services needed at this time  Health Care Screening recommendations as per orders if indicated.  Referrals offered: PT/OT/Nutrition counseling/Behavioral Health/Smoking cessation as per orders if indicated.    Discussion today about general wellness and lifestyle habits:    · Prevent falls and reduce trip hazards; Cautioned about securing or removing rugs.  · Have a working fire alarm and carbon monoxide detector;   · Engage in regular physical activity and social activities       Follow-up: Return in about 1 month (around 9/21/2018) for Blood Pressure management, Cholesterol management.

## 2018-08-21 NOTE — ASSESSMENT & PLAN NOTE
Chronic, uncontrolled, patient does have weightbearing exercise.  Patient also advised to take vitamin D and calcium supplementation, i.e. that in a multivitamin.  Patient has not had 2 or more falls within the last calendar year.  However, patient does have history of femur fracture within the last year.

## 2018-08-21 NOTE — ASSESSMENT & PLAN NOTE
Chronic, uncontrolled, asymptomatic.  Patient is not taking blood pressure medication.  Patient states that she walks at least 45-60 minutes per day at a pace of 2.2 mph.  Patient advised that this is not enough to make her have good cardiovascular exercise (i.e. Not at moderate to high intensities).    ROS is NEGATIVE for dizziness, generalized weakness/fatigue, cold sweats,  vision/hearing changes, jaw pain/paresthesias, BUE pain/paresthesias/numbness/weakness, chest pain/pressure, palpitations, dyspnea, nausea, RUQ abdominal pain, oliguria/anuria, BLE edema.

## 2018-08-21 NOTE — ASSESSMENT & PLAN NOTE
Patient at increased risk for falls, which is exacerbated by the fact the patient also has osteoporosis.  Please see notes from same date of service 8/21/2018 regarding osteoporosis.

## 2018-08-21 NOTE — ASSESSMENT & PLAN NOTE
We discussed that patient is diagnosed with prediabetes, uncontrolled, given that the A1c is:   Lab Results   Component Value Date/Time    HBA1C 5.7 (H) 05/30/2017 07:08 PM        Patient is currently taking (no medication) for glycemic control, and (no medicatoin) for health maintenance related to diabetes mellitus.    We discussed that prediabetes/diabetes mellitus type 2 are medical conditions of lifestyle habits (less than optimal dietary choices, insufficient cardiovascular exercise), and that they can be controlled (in part or in whole) by these lifestyle changes.     Furthermore, we discussed that at present time it is better to pursue lifestyle changes rather than starting medications. Patient is  in agreement.     Please see assessment/plan as below for further details.    ROS is NEGATIVE for blurred vision, polydipsia, polyuria, diaphoresis, palpitations, fatigue, irritability, flank pain, BLE paresthesias.

## 2018-09-20 ENCOUNTER — HOSPITAL ENCOUNTER (OUTPATIENT)
Dept: LAB | Facility: MEDICAL CENTER | Age: 83
End: 2018-09-20
Attending: FAMILY MEDICINE
Payer: MEDICARE

## 2018-09-20 DIAGNOSIS — M81.0 OSTEOPOROSIS, UNSPECIFIED OSTEOPOROSIS TYPE, UNSPECIFIED PATHOLOGICAL FRACTURE PRESENCE: ICD-10-CM

## 2018-09-20 DIAGNOSIS — E78.00 PURE HYPERCHOLESTEROLEMIA: ICD-10-CM

## 2018-09-20 LAB
25(OH)D3 SERPL-MCNC: 16 NG/ML (ref 30–100)
CHOLEST SERPL-MCNC: 257 MG/DL (ref 100–199)
FASTING STATUS PATIENT QL REPORTED: NORMAL
HDLC SERPL-MCNC: 60 MG/DL
LDLC SERPL CALC-MCNC: 182 MG/DL
TRIGL SERPL-MCNC: 77 MG/DL (ref 0–149)

## 2018-09-20 PROCEDURE — 36415 COLL VENOUS BLD VENIPUNCTURE: CPT

## 2018-09-20 PROCEDURE — 82306 VITAMIN D 25 HYDROXY: CPT

## 2018-09-20 PROCEDURE — 80061 LIPID PANEL: CPT

## 2018-09-25 ENCOUNTER — OFFICE VISIT (OUTPATIENT)
Dept: MEDICAL GROUP | Facility: MEDICAL CENTER | Age: 83
End: 2018-09-25
Payer: MEDICARE

## 2018-09-25 VITALS
TEMPERATURE: 98 F | OXYGEN SATURATION: 97 % | BODY MASS INDEX: 22.3 KG/M2 | WEIGHT: 130.6 LBS | HEIGHT: 64 IN | HEART RATE: 63 BPM | SYSTOLIC BLOOD PRESSURE: 142 MMHG | DIASTOLIC BLOOD PRESSURE: 82 MMHG

## 2018-09-25 DIAGNOSIS — E55.9 VITAMIN D DEFICIENCY: ICD-10-CM

## 2018-09-25 DIAGNOSIS — E78.00 PURE HYPERCHOLESTEROLEMIA: ICD-10-CM

## 2018-09-25 DIAGNOSIS — Z23 NEED FOR INFLUENZA VACCINATION: ICD-10-CM

## 2018-09-25 DIAGNOSIS — R03.0 ELEVATED BLOOD PRESSURE, SITUATIONAL: ICD-10-CM

## 2018-09-25 DIAGNOSIS — M81.0 OSTEOPOROSIS, UNSPECIFIED OSTEOPOROSIS TYPE, UNSPECIFIED PATHOLOGICAL FRACTURE PRESENCE: ICD-10-CM

## 2018-09-25 PROCEDURE — 99214 OFFICE O/P EST MOD 30 MIN: CPT | Performed by: FAMILY MEDICINE

## 2018-09-25 NOTE — ASSESSMENT & PLAN NOTE
Chronic, uncontrolled, patient does do weightbearing exercise with treadmill.  However, patient also has vitamin D deficiency.  Please see notes from same date of service 9/25/2018 regarding vitamin D deficiency.  Patient also advised to have calcium supplementation.    Patient is not a falls risk, but did have a femur fracture recently.

## 2018-09-25 NOTE — ASSESSMENT & PLAN NOTE
Chronic, uncontrolled, continues to have mildly hypertensive readings here in clinic.  However, patient's home readings are nonhypertensive in the low 120s over 70s.  Therefore, patient is deemed to have whitecoat hypertension.  However, patient is also advised to continue pursuing lifestyle changes, particularly to cardiovascular exercise as a dietary review reveals that patient is eating a relatively healthy diet with fruits, vegetables, whole grains.    ROS is NEGATIVE for dizziness, generalized weakness/fatigue, cold sweats,  vision/hearing changes, jaw pain/paresthesias, BUE pain/paresthesias/numbness/weakness, chest pain/pressure, palpitations, dyspnea, nausea, RUQ abdominal pain, oliguria/anuria, BLE edema.

## 2018-09-25 NOTE — ASSESSMENT & PLAN NOTE
New problem by lab evaluation, uncontrolled, patient advised to take high-dose vitamin D supplementation, then to recheck this in at least 1 week after last dose.

## 2018-09-25 NOTE — ASSESSMENT & PLAN NOTE
Patient and I discussed recent labs (see below; pure hypercholesterolemia, uncontrolled) and that ASCVD risk is increased based on most recent lipid panel, current blood pressure (white coat hypertensive), diabetes status (prediabetic), and smoking status (non-smoker).    Patient and I then discussed necessary dietary changes to make to address dyslipidemia.  Patient is NOT currently taking cholesterol lowering medication.  Patient verbalized understanding.    ROS is NEGATIVE for dizziness, generalized weakness/fatigue, vision/hearing changes, jaw pain/paresthesias, BUE pain/paresthesias/numbness/weakness, chest pain/pressure, palpitations, dyspnea, RUQ abdominal pain, oliguria/anuria, BLE edema.    Lab Results   Component Value Date/Time    CHOLSTRLTOT 257 (H) 09/20/2018 07:15 AM     (H) 09/20/2018 07:15 AM    HDL 60 09/20/2018 07:15 AM    TRIGLYCERIDE 77 09/20/2018 07:15 AM

## 2018-09-25 NOTE — PROGRESS NOTES
Subjective:   Chief Complaint/History of Present Illness:  Char Rockwell is a 88 y.o. female established patient who presents today to discuss medical problems as listed below    Diagnoses of Elevated blood pressure, situational, Pure hypercholesterolemia, Vitamin D deficiency, Osteoporosis, unspecified osteoporosis type, unspecified pathological fracture presence, and Need for influenza vaccination were pertinent to this visit.    Elevated blood pressure, situational  Chronic, uncontrolled, continues to have mildly hypertensive readings here in clinic.  However, patient's home readings are nonhypertensive in the low 120s over 70s.  Therefore, patient is deemed to have whitecoat hypertension.  However, patient is also advised to continue pursuing lifestyle changes, particularly to cardiovascular exercise as a dietary review reveals that patient is eating a relatively healthy diet with fruits, vegetables, whole grains.    ROS is NEGATIVE for dizziness, generalized weakness/fatigue, cold sweats,  vision/hearing changes, jaw pain/paresthesias, BUE pain/paresthesias/numbness/weakness, chest pain/pressure, palpitations, dyspnea, nausea, RUQ abdominal pain, oliguria/anuria, BLE edema.     Pure hypercholesterolemia  Patient and I discussed recent labs (see below; pure hypercholesterolemia, uncontrolled) and that ASCVD risk is increased based on most recent lipid panel, current blood pressure (white coat hypertensive), diabetes status (prediabetic), and smoking status (non-smoker).    Patient and I then discussed necessary dietary changes to make to address dyslipidemia.  Patient is NOT currently taking cholesterol lowering medication.  Patient verbalized understanding.    ROS is NEGATIVE for dizziness, generalized weakness/fatigue, vision/hearing changes, jaw pain/paresthesias, BUE pain/paresthesias/numbness/weakness, chest pain/pressure, palpitations, dyspnea, RUQ abdominal pain, oliguria/anuria, BLE  "edema.    Lab Results   Component Value Date/Time    CHOLSTRLTOT 257 (H) 09/20/2018 07:15 AM     (H) 09/20/2018 07:15 AM    HDL 60 09/20/2018 07:15 AM    TRIGLYCERIDE 77 09/20/2018 07:15 AM        Vitamin D deficiency  New problem by lab evaluation, uncontrolled, patient advised to take high-dose vitamin D supplementation, then to recheck this in at least 1 week after last dose.    Osteoporosis  Chronic, uncontrolled, patient does do weightbearing exercise with treadmill.  However, patient also has vitamin D deficiency.  Please see notes from same date of service 9/25/2018 regarding vitamin D deficiency.  Patient also advised to have calcium supplementation.    Patient is not a falls risk, but did have a femur fracture recently.      Patient Active Problem List    Diagnosis Date Noted   • Vitamin D deficiency 09/25/2018   • Risk for falls 08/21/2018   • Prediabetes 06/29/2017   • Systolic murmur 06/29/2017   • Osteoporosis 08/24/2016   • Elevated blood pressure, situational 08/24/2016   • Pure hypercholesterolemia 08/24/2016   • H/O bilateral cataract extraction 08/24/2016       Additional History:   Allergies:    Soy allergy     Current Medications:     Current Outpatient Prescriptions   Medication Sig Dispense Refill   • Cholecalciferol 70010 UNIT Cap Take 1 Cap by mouth every 7 days for 57 days. 8 Cap 0     No current facility-administered medications for this visit.         Social History:     Social History   Substance Use Topics   • Smoking status: Never Smoker   • Smokeless tobacco: Never Used   • Alcohol use No       ROS:     - NOTE: All other systems reviewed and are negative, except as in HPI.     Objective:   Physical Exam:    Vitals: Blood pressure 142/82, pulse 63, temperature 36.7 °C (98 °F), height 1.626 m (5' 4.02\"), weight 59.2 kg (130 lb 9.6 oz), SpO2 97 %, not currently breastfeeding.   BMI: Body mass index is 22.41 kg/m².   General/Constitutional: Vitals as above, Well nourished, well " developed female in no acute distress   Head/Eyes: Head is grossly normal & atraumatic, bilateral conjunctivae clear and not injected, bilateral EOMI, bilateral PERRL   ENT: Bilateral external ears grossly normal in appearance, Hearing grossly intact, External nares normal in appearance and without discharge/bleeding   Respiratory: No respiratory distress, bilateral lungs are clear to ausculation in all lung fields (anterior/lateral/posterior), no wheezing/rhonchi/rales   Cardiovascular: Regular rate and rhythm without murmur/gallops/rubs, distal pulses are intact and equal bilaterally (radial, posterior tibial), no bilateral lower extremity edema   MSK: Gait grossly normal & not antalgic   Integumentary: No apparent rashes   Psych: Judgment grossly appropriate, no apparent depression/anxiety    Health Maintenance:     - Patient to get a flu shot within the next 2-4 weeks    Imaging/Labs:     - 09/20/18 -- cholesterol levels are unchanged from 1month ago, and vitamin D level is low    - 08/17/18 -- Pure hypercholesterolemia (worsened, TChol 277, ), CMP with alk phos and glc elevated    - 05/20/18 -- negative for urinary tract infection.  Pt can discontinue Abx if asx, o/w continue abx to completion.    Assessment and Plan:   1. Elevated blood pressure, situational  Chronic, uncontrolled, patient apparently with high white coat hypertension.    2. Pure hypercholesterolemia  Chronic, uncontrolled, patient's work on lifestyle changes, which can also help #1 as above.   - LIPID PROFILE; Future    3. Vitamin D deficiency  New problem, uncontrolled, start high-dose Vitamin D as below, recheck at least 1 week after last dose.   - Cholecalciferol 74444 UNIT Cap; Take 1 Cap by mouth every 7 days for 57 days.  Dispense: 8 Cap; Refill: 0   - VITAMIN D,25 HYDROXY; Future    4. Osteoporosis, unspecified osteoporosis type, unspecified pathological fracture presence  Chronic, uncontrolled, patient instructed to have vitamin  D and calcium supplementation, as well as weightbearing exercise.    5. Need for influenza vaccination  Patient to get flu vaccine within the next 2-4 weeks.        RTC: In February 2019 for blood pressure recheck.    PLEASE NOTE: This dictation was created using voice recognition software. I have made every reasonable attempt to correct obvious errors, but I expect that there are errors of grammar and possibly content that I did not discover before finalizing the note.

## 2018-11-16 RX ORDER — INFLUENZA A VIRUS A/MICHIGAN/45/2015 X-275 (H1N1) ANTIGEN (FORMALDEHYDE INACTIVATED), INFLUENZA A VIRUS A/SINGAPORE/INFIMH-16-0019/2016 IVR-186 (H3N2) ANTIGEN (FORMALDEHYDE INACTIVATED), AND INFLUENZA B VIRUS B/MARYLAND/15/2016 BX-69A (A B/COLORADO/6/2017-LIKE VIRUS) ANTIGEN (FORMALDEHYDE INACTIVATED) 60; 60; 60 UG/.5ML; UG/.5ML; UG/.5ML
INJECTION, SUSPENSION INTRAMUSCULAR
Refills: 0 | COMMUNITY
Start: 2018-09-29 | End: 2019-01-10

## 2018-11-28 ENCOUNTER — APPOINTMENT (RX ONLY)
Dept: URBAN - METROPOLITAN AREA CLINIC 20 | Facility: CLINIC | Age: 83
Setting detail: DERMATOLOGY
End: 2018-11-28

## 2018-11-28 DIAGNOSIS — L81.4 OTHER MELANIN HYPERPIGMENTATION: ICD-10-CM

## 2018-11-28 DIAGNOSIS — L82.0 INFLAMED SEBORRHEIC KERATOSIS: ICD-10-CM

## 2018-11-28 DIAGNOSIS — L82.1 OTHER SEBORRHEIC KERATOSIS: ICD-10-CM

## 2018-11-28 DIAGNOSIS — D22 MELANOCYTIC NEVI: ICD-10-CM

## 2018-11-28 DIAGNOSIS — Z87.2 PERSONAL HISTORY OF DISEASES OF THE SKIN AND SUBCUTANEOUS TISSUE: ICD-10-CM

## 2018-11-28 DIAGNOSIS — L57.8 OTHER SKIN CHANGES DUE TO CHRONIC EXPOSURE TO NONIONIZING RADIATION: ICD-10-CM

## 2018-11-28 DIAGNOSIS — D18.0 HEMANGIOMA: ICD-10-CM

## 2018-11-28 PROBLEM — D48.5 NEOPLASM OF UNCERTAIN BEHAVIOR OF SKIN: Status: ACTIVE | Noted: 2018-11-28

## 2018-11-28 PROBLEM — D22.5 MELANOCYTIC NEVI OF TRUNK: Status: ACTIVE | Noted: 2018-11-28

## 2018-11-28 PROBLEM — D22.72 MELANOCYTIC NEVI OF LEFT LOWER LIMB, INCLUDING HIP: Status: ACTIVE | Noted: 2018-11-28

## 2018-11-28 PROBLEM — D18.01 HEMANGIOMA OF SKIN AND SUBCUTANEOUS TISSUE: Status: ACTIVE | Noted: 2018-11-28

## 2018-11-28 PROCEDURE — ? COUNSELING

## 2018-11-28 PROCEDURE — 17110 DESTRUCTION B9 LES UP TO 14: CPT

## 2018-11-28 PROCEDURE — ? OBSERVATION AND MEASURE

## 2018-11-28 PROCEDURE — ? LIQUID NITROGEN

## 2018-11-28 PROCEDURE — 99214 OFFICE O/P EST MOD 30 MIN: CPT | Mod: 25

## 2018-11-28 ASSESSMENT — LOCATION SIMPLE DESCRIPTION DERM
LOCATION SIMPLE: RIGHT FOREARM
LOCATION SIMPLE: RIGHT THIGH
LOCATION SIMPLE: LEFT UPPER ARM
LOCATION SIMPLE: RIGHT UPPER ARM
LOCATION SIMPLE: LEFT CALF
LOCATION SIMPLE: LEFT CHEEK
LOCATION SIMPLE: RIGHT CHEEK
LOCATION SIMPLE: CHEST
LOCATION SIMPLE: LEFT FOREARM
LOCATION SIMPLE: RIGHT UPPER BACK
LOCATION SIMPLE: LEFT THIGH
LOCATION SIMPLE: RIGHT POSTERIOR UPPER ARM

## 2018-11-28 ASSESSMENT — LOCATION ZONE DERM
LOCATION ZONE: TRUNK
LOCATION ZONE: ARM
LOCATION ZONE: LEG
LOCATION ZONE: FACE

## 2018-11-28 ASSESSMENT — LOCATION DETAILED DESCRIPTION DERM
LOCATION DETAILED: LEFT DISTAL CALF
LOCATION DETAILED: RIGHT CENTRAL MALAR CHEEK
LOCATION DETAILED: RIGHT ANTERIOR PROXIMAL UPPER ARM
LOCATION DETAILED: LEFT DISTAL DORSAL FOREARM
LOCATION DETAILED: RIGHT SUPERIOR MEDIAL UPPER BACK
LOCATION DETAILED: RIGHT ANTERIOR DISTAL THIGH
LOCATION DETAILED: LEFT INFERIOR CENTRAL MALAR CHEEK
LOCATION DETAILED: RIGHT INFERIOR UPPER BACK
LOCATION DETAILED: RIGHT PROXIMAL DORSAL FOREARM
LOCATION DETAILED: LEFT PROXIMAL DORSAL FOREARM
LOCATION DETAILED: RIGHT DISTAL POSTERIOR UPPER ARM
LOCATION DETAILED: LEFT ANTERIOR DISTAL THIGH
LOCATION DETAILED: RIGHT MID-UPPER BACK
LOCATION DETAILED: LEFT ANTERIOR PROXIMAL UPPER ARM
LOCATION DETAILED: LEFT MEDIAL SUPERIOR CHEST

## 2018-11-28 NOTE — HPI: FULL BODY SKIN EXAMINATION
How Severe Are Your Spot(S)?: mild
What Type Of Note Output Would You Prefer (Optional)?: Bullet Format
What Is The Reason For Today's Visit?: Full Body Skin Examination
What Is The Reason For Today's Visit? (Being Monitored For X): the risk of recurrence of previously treated lesion(s)

## 2018-12-11 ENCOUNTER — OFFICE VISIT (OUTPATIENT)
Dept: URGENT CARE | Facility: CLINIC | Age: 83
End: 2018-12-11
Payer: MEDICARE

## 2018-12-11 ENCOUNTER — HOSPITAL ENCOUNTER (OUTPATIENT)
Facility: MEDICAL CENTER | Age: 83
End: 2018-12-11
Attending: NURSE PRACTITIONER
Payer: MEDICARE

## 2018-12-11 VITALS
TEMPERATURE: 97.9 F | RESPIRATION RATE: 16 BRPM | BODY MASS INDEX: 22.36 KG/M2 | DIASTOLIC BLOOD PRESSURE: 74 MMHG | OXYGEN SATURATION: 97 % | SYSTOLIC BLOOD PRESSURE: 116 MMHG | HEIGHT: 64 IN | WEIGHT: 131 LBS | HEART RATE: 88 BPM

## 2018-12-11 DIAGNOSIS — N30.01 ACUTE CYSTITIS WITH HEMATURIA: ICD-10-CM

## 2018-12-11 DIAGNOSIS — R30.0 DYSURIA: ICD-10-CM

## 2018-12-11 LAB
APPEARANCE UR: NORMAL
BILIRUB UR STRIP-MCNC: NORMAL MG/DL
COLOR UR AUTO: YELLOW
GLUCOSE UR STRIP.AUTO-MCNC: NORMAL MG/DL
KETONES UR STRIP.AUTO-MCNC: NORMAL MG/DL
LEUKOCYTE ESTERASE UR QL STRIP.AUTO: NORMAL
NITRITE UR QL STRIP.AUTO: NORMAL
PH UR STRIP.AUTO: 5.5 [PH] (ref 5–8)
PROT UR QL STRIP: NORMAL MG/DL
RBC UR QL AUTO: NORMAL
SP GR UR STRIP.AUTO: 1.01
UROBILINOGEN UR STRIP-MCNC: 0.2 MG/DL

## 2018-12-11 PROCEDURE — 81002 URINALYSIS NONAUTO W/O SCOPE: CPT | Performed by: NURSE PRACTITIONER

## 2018-12-11 PROCEDURE — 87186 SC STD MICRODIL/AGAR DIL: CPT

## 2018-12-11 PROCEDURE — 99214 OFFICE O/P EST MOD 30 MIN: CPT | Performed by: NURSE PRACTITIONER

## 2018-12-11 PROCEDURE — 87077 CULTURE AEROBIC IDENTIFY: CPT

## 2018-12-11 PROCEDURE — 87086 URINE CULTURE/COLONY COUNT: CPT

## 2018-12-11 RX ORDER — SULFAMETHOXAZOLE AND TRIMETHOPRIM 800; 160 MG/1; MG/1
1 TABLET ORAL EVERY 12 HOURS
Qty: 10 TAB | Refills: 0 | Status: SHIPPED | OUTPATIENT
Start: 2018-12-11 | End: 2018-12-16

## 2018-12-11 ASSESSMENT — ENCOUNTER SYMPTOMS
EYE PAIN: 0
MYALGIAS: 0
CHILLS: 0
SHORTNESS OF BREATH: 0
FATIGUE: 0
SORE THROAT: 0
NAUSEA: 0
WEAKNESS: 0
FLANK PAIN: 0
VOMITING: 0
FEVER: 0
DIZZINESS: 0

## 2018-12-11 NOTE — PROGRESS NOTES
"Subjective:   Char Rockwell is a 88 y.o. female who presents for UTI (x2 days, Abdominal pressure, urgency, frequency)        UTI   This is a new problem. The current episode started in the past 7 days. The problem occurs constantly. The problem has been unchanged. Associated symptoms include urinary symptoms. Pertinent negatives include no chest pain, chills, congestion, fatigue, fever, myalgias, nausea, rash, sore throat, vomiting or weakness. Nothing aggravates the symptoms. She has tried nothing for the symptoms. The treatment provided no relief.     Review of Systems   Constitutional: Negative for chills, fatigue and fever.   HENT: Negative for congestion and sore throat.    Eyes: Negative for pain.   Respiratory: Negative for shortness of breath.    Cardiovascular: Negative for chest pain.   Gastrointestinal: Negative for nausea and vomiting.   Genitourinary: Positive for dysuria, frequency and urgency. Negative for flank pain and hematuria.   Musculoskeletal: Negative for myalgias.   Skin: Negative for rash.   Neurological: Negative for dizziness and weakness.     Allergies   Allergen Reactions   • Soy Allergy      Upset stomach       Objective:   /74 (BP Location: Left arm, Patient Position: Sitting, BP Cuff Size: Adult)   Pulse 88   Temp 36.6 °C (97.9 °F) (Temporal)   Resp 16   Ht 1.626 m (5' 4\")   Wt 59.4 kg (131 lb)   SpO2 97%   BMI 22.49 kg/m²   Physical Exam   Constitutional: She is oriented to person, place, and time. She appears well-developed and well-nourished. No distress.   HENT:   Head: Normocephalic and atraumatic.   Eyes: Pupils are equal, round, and reactive to light. Conjunctivae and EOM are normal.   Cardiovascular: Normal rate and regular rhythm.    No murmur heard.  Pulmonary/Chest: Effort normal and breath sounds normal. No respiratory distress.   Abdominal: Soft. She exhibits no distension. There is tenderness in the suprapubic area. There is no CVA tenderness. "   Neurological: She is alert and oriented to person, place, and time. She has normal reflexes. No sensory deficit.   Skin: Skin is warm and dry.   Psychiatric: She has a normal mood and affect.         Assessment/Plan:     1. Acute cystitis with hematuria  POCT Urinalysis    Urine Culture    sulfamethoxazole-trimethoprim (BACTRIM DS) 800-160 MG tablet   2. Dysuria  POCT Urinalysis    Urine Culture    sulfamethoxazole-trimethoprim (BACTRIM DS) 800-160 MG tablet     Urinalysis positive; leukocytes, nitrates, RBCs    Pt. Was given ABX therapy today and will change therapy if culture indicates this is necessary. ER precautions given- worsening symptoms, back pain, abd. Pain, or fevers. Pt. Is to increase fluids, and take the complete duration of the therapy.  Pt. Understands and agrees with the plan.   Differential diagnosis, natural history, supportive care, and indications for immediate follow-up discussed.

## 2018-12-13 LAB
BACTERIA UR CULT: ABNORMAL
BACTERIA UR CULT: ABNORMAL
SIGNIFICANT IND 70042: ABNORMAL
SITE SITE: ABNORMAL
SOURCE SOURCE: ABNORMAL

## 2018-12-15 ENCOUNTER — TELEPHONE (OUTPATIENT)
Dept: URGENT CARE | Facility: CLINIC | Age: 83
End: 2018-12-15

## 2018-12-15 DIAGNOSIS — N39.0 URINARY TRACT INFECTION WITHOUT HEMATURIA, SITE UNSPECIFIED: ICD-10-CM

## 2018-12-15 RX ORDER — AMOXICILLIN 500 MG/1
500 CAPSULE ORAL 2 TIMES DAILY
Qty: 10 CAP | Refills: 0 | Status: SHIPPED | OUTPATIENT
Start: 2018-12-15 | End: 2018-12-20

## 2018-12-27 ENCOUNTER — HOSPITAL ENCOUNTER (OUTPATIENT)
Facility: MEDICAL CENTER | Age: 83
End: 2018-12-27
Attending: INTERNAL MEDICINE
Payer: MEDICARE

## 2018-12-27 ENCOUNTER — OFFICE VISIT (OUTPATIENT)
Dept: MEDICAL GROUP | Facility: MEDICAL CENTER | Age: 83
End: 2018-12-27
Payer: MEDICARE

## 2018-12-27 VITALS
RESPIRATION RATE: 16 BRPM | DIASTOLIC BLOOD PRESSURE: 74 MMHG | BODY MASS INDEX: 22.39 KG/M2 | SYSTOLIC BLOOD PRESSURE: 132 MMHG | WEIGHT: 131.17 LBS | HEIGHT: 64 IN | HEART RATE: 89 BPM | OXYGEN SATURATION: 97 % | TEMPERATURE: 99.2 F

## 2018-12-27 DIAGNOSIS — R35.0 URINARY FREQUENCY: ICD-10-CM

## 2018-12-27 PROCEDURE — 81002 URINALYSIS NONAUTO W/O SCOPE: CPT | Performed by: INTERNAL MEDICINE

## 2018-12-27 PROCEDURE — 87086 URINE CULTURE/COLONY COUNT: CPT

## 2018-12-27 PROCEDURE — 99213 OFFICE O/P EST LOW 20 MIN: CPT | Performed by: INTERNAL MEDICINE

## 2018-12-27 RX ORDER — PNEUMOCOCCAL VACCINE POLYVALENT 25; 25; 25; 25; 25; 25; 25; 25; 25; 25; 25; 25; 25; 25; 25; 25; 25; 25; 25; 25; 25; 25; 25 UG/.5ML; UG/.5ML; UG/.5ML; UG/.5ML; UG/.5ML; UG/.5ML; UG/.5ML; UG/.5ML; UG/.5ML; UG/.5ML; UG/.5ML; UG/.5ML; UG/.5ML; UG/.5ML; UG/.5ML; UG/.5ML; UG/.5ML; UG/.5ML; UG/.5ML; UG/.5ML; UG/.5ML; UG/.5ML; UG/.5ML
INJECTION, SOLUTION INTRAMUSCULAR; SUBCUTANEOUS
Refills: 0 | COMMUNITY
Start: 2018-11-20 | End: 2019-01-10

## 2018-12-27 NOTE — PROGRESS NOTES
CC:  The encounter diagnosis was History of frequent urinary tract infections.    HISTORY OF THE PRESENT ILLNESS: Patient is a 88 y.o. female. This pleasant patient is here today, Pt of Dr. Lewis, here to f/u recent UTI.    Patient was seen at urgent care 12/11/18 for symptoms of urinary tract infection which included bladder pressure.  Initially empirically prescribed Bactrim.  Culture results return Enterococcus faecalis greater than 100,000 organisms and she was changed to amoxicillin based on sensitivities.  She did finish her complete course of amoxicillin with total resolution of all of her urinary symptoms.  Patient indicates she does have some concern for frequent UTIs occurring 2-3 times per year.  She indicates she has very good hygiene, she has tried other conservative measures such as cranberry juice, hydration, etc.  When she was much younger in the 1960s and 1970s she had frequent urinary infections at that time and she tells me she saw a urologist with no noted anatomical issues.  Today urinalysisis negative for nitrate and LE, patient does request repeat urine culture.  We discussed urine culture will be negative based on urinalysis, however patient indicates this would just make her feel much more reassured.        Allergies: Soy allergy    Current Outpatient Prescriptions Ordered in [facility]   Medication Sig Dispense Refill   • PNEUMOVAX 23 25 MCG/0.5ML Injection ADM 0.5ML IM UTD  0   • FLUZONE HIGH-DOSE 0.5 ML Suspension Prefilled Syringe injection TO BE ADMINISTERED BY PHARMACIST FOR IMMUNIZATION  0     No current [facility]-ordered facility-administered medications on file.        Past Medical History:   Diagnosis Date   • Allergy     environmental   • Cataract        Past Surgical History:   Procedure Laterality Date   • HIP NAILING INTRAMEDULLARY Left 5/30/2017    Procedure: HIP NAILING INTRAMEDULLARY;  Surgeon: Zion Pimentel M.D.;  Location: SURGERY [facility];  Service:    • MAMMOPLASTY  "AUGMENTATION  age 30's       Social History   Substance Use Topics   • Smoking status: Never Smoker   • Smokeless tobacco: Never Used   • Alcohol use No       Social History     Social History Narrative   • No narrative on file       Family History   Problem Relation Age of Onset   • Family history unknown: Yes       ROS:     - Constitutional: Negative for fever, chills      - Respiratory: Negative for cough, sputum production, chest congestion, dyspnea, wheezing, and crackles.      - Cardiovascular:Negative for chest pain, palpitations, orthopnea, and bilateral lower extremity edema.     - Gastrointestinal: Negative for heartburn, nausea, vomiting, abdominal pain, hematochezia, melena, diarrhea, constipation, and greasy/foul-smelling stools.     - Genitourinary: Negative for dysuria, polyuria, hematuria, pyuria, urinary urgency, and urinary incontinence.     - Musculoskeletal: Negative for myalgias, back pain, and joint pain.     - Skin: Negative for rash, itching, cyanotic skin color change.      Ucx from 12/13/18 reviewed    Exam: Blood pressure 132/74, pulse 89, temperature 37.3 °C (99.2 °F), temperature source Temporal, resp. rate 16, height 1.626 m (5' 4\"), weight 59.5 kg (131 lb 2.8 oz), SpO2 97 %, not currently breastfeeding. Body mass index is 22.52 kg/m².    General: Normal appearing. No distress.  Pulmonary: Clear to ausculation.  Normal effort. No rales, ronchi, or wheezing.  Cardiovascular: Regular rate and rhythm.    Abdomen: Soft, nontender   Neurologic: Grossly nonfocal  Skin: Warm and dry.  No obvious lesions.  Musculoskeletal: Normal gait. No extremity cyanosis, clubbing, or edema.  Psych: Normal mood and affect. Alert and oriented x3. Judgment and insight is normal.    Please note that this dictation was created using voice recognition software. I have made every reasonable attempt to correct obvious errors, but I expect that there are errors of grammar and possibly content that I did not discover " before finalizing the note.      Assessment/Plan  1. Urinary Frequency  Resolution of recent UTI.   Patient wishes to have urinary culture for reassurance  - POCT Urinalysis  - URINE CULTURE(NEW); Future

## 2018-12-28 DIAGNOSIS — R35.0 URINARY FREQUENCY: ICD-10-CM

## 2018-12-31 LAB
BACTERIA UR CULT: NORMAL
SIGNIFICANT IND 70042: NORMAL
SITE SITE: NORMAL
SOURCE SOURCE: NORMAL

## 2019-01-10 ENCOUNTER — OFFICE VISIT (OUTPATIENT)
Dept: INTERNAL MEDICINE | Facility: IMAGING CENTER | Age: 84
End: 2019-01-10
Payer: MEDICARE

## 2019-01-10 VITALS
SYSTOLIC BLOOD PRESSURE: 120 MMHG | RESPIRATION RATE: 14 BRPM | HEIGHT: 63 IN | BODY MASS INDEX: 23.04 KG/M2 | TEMPERATURE: 98.9 F | DIASTOLIC BLOOD PRESSURE: 70 MMHG | HEART RATE: 74 BPM | OXYGEN SATURATION: 97 % | WEIGHT: 130 LBS

## 2019-01-10 DIAGNOSIS — Z87.440 HISTORY OF RECURRENT UTIS: Chronic | ICD-10-CM

## 2019-01-10 DIAGNOSIS — E78.00 PURE HYPERCHOLESTEROLEMIA: ICD-10-CM

## 2019-01-10 DIAGNOSIS — M81.0 OSTEOPOROSIS WITHOUT CURRENT PATHOLOGICAL FRACTURE, UNSPECIFIED OSTEOPOROSIS TYPE: ICD-10-CM

## 2019-01-10 DIAGNOSIS — R03.0 ELEVATED BLOOD PRESSURE, SITUATIONAL: ICD-10-CM

## 2019-01-10 PROCEDURE — 99214 OFFICE O/P EST MOD 30 MIN: CPT | Performed by: INTERNAL MEDICINE

## 2019-01-10 NOTE — PROGRESS NOTES
"CC: establish care      HPI:   Char presents today with the following.    1. Osteoporosis without current pathological fracture, unspecified osteoporosis type  Longstanding history. Sounds like she did not tolerate 2 different anti resorptive medication.     2. Pure hypercholesterolemia  Has had for many years. No h/o heart disease. No chest pain. She works out on TM or elliptical every day, for an hour or more.    3. Elevated blood pressure, situational  She has had this evaluated in the past, and her home BP is always @ 120s/70s.    4. History of recurrent UTIs  Last was one month ago. C & S is in record. Sensitive to ampicillin.      Patient Active Problem List    Diagnosis Date Noted   • History of recurrent UTIs 01/10/2019   • Vitamin D deficiency 09/25/2018   • Risk for falls 08/21/2018   • Prediabetes 06/29/2017   • Systolic murmur 06/29/2017   • Osteoporosis 08/24/2016   • Elevated blood pressure, situational 08/24/2016   • Pure hypercholesterolemia 08/24/2016   • H/O bilateral cataract extraction 08/24/2016       Past medical, surgical, family, and social history was reviewed and updated in EPIC chart by me today.     Medications and allergies reviewed and updated in EPIC chart by me today.     Labs and applicable imaging discussed and reviewed with patient.     ROS: Pertinent positives listed above in HPI. She exercises 6 days/week, elliptical and TM. All other systems have been reviewed and are negative.     No current outpatient prescriptions on file.     No current facility-administered medications for this visit.          Allergies as of 01/10/2019 - Reviewed 01/10/2019   Allergen Reaction Noted   • Soy allergy  09/19/2017          /70 Comment: reported (due to significant white coat htn)  Pulse 74   Temp 37.2 °C (98.9 °F) (Temporal)   Resp 14   Ht 1.6 m (5' 3\")   Wt 59 kg (130 lb)   SpO2 97%   BMI 23.03 kg/m²     Physical Exam:  Gen:      Alert and oriented, No apparent " distress.  HEET:   No Lymphadenopathy or Bruits.  NECK:  No adenopathy; no thyromegaly  She has bilateral breast implants. No adenopathy.  Lungs:  Clear to auscultation bilaterally  CV:       Regular rate and rhythm.           Abd:      Adequate bowel sounds; soft and nontender; no rebound, rigidity, nor distention  Ext:       No clubbing, cyanosis, edema. Good pedal pulses.  Skin:     Warm and dry; no rashes noted  Neuro:  No focal deficits noted. Very good conversation.  Psych:  AAOx4; mood and affect are appropriate  EMR reviewed with the patient, including labs from the past 18 months.      Assessment and Plan.   88 y.o. female with the following issues.    1. Osteoporosis without current pathological fracture, unspecified osteoporosis type  She was intolerant to medication many years ago. She has very good lifestyle around this issue: regular exercise; no smoking; very little alcohol. Her hip fracture was seondary to a significant fall from a stool. She is very cautious in regards to this. She take vitamin D sporadically. I`ve advised her to take 2000 IUs and 500 mg of calcium daily. (her vitamin D has been low in the past.)    2. Pure hypercholesterolemia  This has not been an issue, and again, I just encourage her to continue with her healthy lifestyle.    3. Elevated blood pressure, situational  Evaluated many times in the past. Her at home BPs are excellent.    4. History of recurrent UTIs  Need to note that she is intolerant to cipro and that her last culture was an enterococcus, sensitive to ampicillin.      Face to face time: 45 minutes with greater than 50% of time spent with direct patient contact and medical management.

## 2019-02-28 ENCOUNTER — APPOINTMENT (OUTPATIENT)
Dept: MEDICAL GROUP | Facility: MEDICAL CENTER | Age: 84
End: 2019-02-28
Payer: MEDICARE

## 2019-03-04 ENCOUNTER — TELEPHONE (OUTPATIENT)
Dept: INTERNAL MEDICINE | Facility: IMAGING CENTER | Age: 84
End: 2019-03-04

## 2019-03-04 RX ORDER — SULFAMETHOXAZOLE AND TRIMETHOPRIM 800; 160 MG/1; MG/1
1 TABLET ORAL 2 TIMES DAILY
Qty: 14 TAB | Refills: 0 | Status: SHIPPED | OUTPATIENT
Start: 2019-03-04 | End: 2019-03-11

## 2019-03-04 NOTE — TELEPHONE ENCOUNTER
"Day # 6 of upper respiratory infection with sinus congestion, post nasal drip, & cough productive of yellow/gray sputum.  Afebrile.  No GI/ complaints.  Feels she \"needs an antibiotic.\"  Prescription to preferred pharmacy per Dr Fox.  Instructed to call promptly if no improvement in 48 hours.  "

## 2019-03-07 ENCOUNTER — OFFICE VISIT (OUTPATIENT)
Dept: INTERNAL MEDICINE | Facility: IMAGING CENTER | Age: 84
End: 2019-03-07
Payer: MEDICARE

## 2019-03-07 ENCOUNTER — HOSPITAL ENCOUNTER (OUTPATIENT)
Dept: RADIOLOGY | Facility: MEDICAL CENTER | Age: 84
End: 2019-03-07
Attending: INTERNAL MEDICINE
Payer: MEDICARE

## 2019-03-07 VITALS
TEMPERATURE: 99 F | DIASTOLIC BLOOD PRESSURE: 70 MMHG | SYSTOLIC BLOOD PRESSURE: 150 MMHG | RESPIRATION RATE: 14 BRPM | OXYGEN SATURATION: 96 % | HEART RATE: 88 BPM | HEIGHT: 64 IN | BODY MASS INDEX: 22.2 KG/M2 | WEIGHT: 130 LBS

## 2019-03-07 DIAGNOSIS — J06.9 UPPER RESPIRATORY TRACT INFECTION, UNSPECIFIED TYPE: ICD-10-CM

## 2019-03-07 PROCEDURE — 99213 OFFICE O/P EST LOW 20 MIN: CPT | Performed by: INTERNAL MEDICINE

## 2019-03-07 PROCEDURE — 71046 X-RAY EXAM CHEST 2 VIEWS: CPT

## 2019-03-07 NOTE — PROGRESS NOTES
"CC: 9 day h/o uri    HPI:   Char presents today with the following.    1. Upper respiratory tract infection, unspecified type  Started as pain and pressure @ her frontal sinus area 9 days ago. She started septra 4 days ago. The sinus pressure has improved but she has developed a cough with minimal sputum, but very exacerbated at times. No definite fever, but flushed at times.      Patient Active Problem List    Diagnosis Date Noted   • History of recurrent UTIs 01/10/2019   • Vitamin D deficiency 09/25/2018   • Risk for falls 08/21/2018   • Prediabetes 06/29/2017   • Systolic murmur 06/29/2017   • Osteoporosis 08/24/2016   • Elevated blood pressure, situational 08/24/2016   • Pure hypercholesterolemia 08/24/2016   • H/O bilateral cataract extraction 08/24/2016       Past medical, surgical, family, and social history was reviewed and updated in EPIC chart by me today.     Medications and allergies reviewed and updated in EPIC chart by me today.     Labs and applicable imaging discussed and reviewed with patient.     ROS: Pertinent positives listed above in HPI. All other systems have been reviewed and are stable.     Current Outpatient Prescriptions   Medication Sig Dispense Refill   • sulfamethoxazole-trimethoprim (BACTRIM DS) 800-160 MG tablet Take 1 Tab by mouth 2 times a day for 7 days. 14 Tab 0     No current facility-administered medications for this visit.          Allergies as of 03/07/2019 - Reviewed 03/07/2019   Allergen Reaction Noted   • Soy allergy  09/19/2017          /70 (BP Location: Left arm, Patient Position: Sitting, BP Cuff Size: Adult)   Pulse 88   Temp 37.2 °C (99 °F) (Temporal)   Resp 14   Ht 1.626 m (5' 4\")   Wt 59 kg (130 lb)   SpO2 96%   BMI 22.31 kg/m²     Physical Exam:  Gen:      Alert and oriented, No apparent distress.  HEET:     tms clear. Throat is clear  NECK:  No adenopathy  Lungs:  Fine right basilar crackles  CV:       Regular rate and rhythm.         Skin:     " Warm and dry; no rashes noted  Neuro:  No focal deficits noted  Psych:  AAOx4; mood and affect are appropriate  Chest x ray: NAD    Assessment and Plan.   89 y.o. female with the following issues.    1. Upper respiratory tract infection, unspecified type  She seems to be improving particularly over the past 24 hours. She will use PTC suppresent at night and an expectorant during day. She will notify me if she feels her symptoms are getting worse.

## 2019-05-07 ENCOUNTER — APPOINTMENT (RX ONLY)
Dept: URBAN - METROPOLITAN AREA CLINIC 20 | Facility: CLINIC | Age: 84
Setting detail: DERMATOLOGY
End: 2019-05-07

## 2019-05-07 DIAGNOSIS — L81.4 OTHER MELANIN HYPERPIGMENTATION: ICD-10-CM

## 2019-05-07 DIAGNOSIS — D22 MELANOCYTIC NEVI: ICD-10-CM | Status: UNCHANGED

## 2019-05-07 PROBLEM — D22.72 MELANOCYTIC NEVI OF LEFT LOWER LIMB, INCLUDING HIP: Status: ACTIVE | Noted: 2019-05-07

## 2019-05-07 PROCEDURE — ? FOLLOW UP FOR NEXT VISIT

## 2019-05-07 PROCEDURE — ? COUNSELING

## 2019-05-07 PROCEDURE — 99213 OFFICE O/P EST LOW 20 MIN: CPT

## 2019-05-07 PROCEDURE — ? OBSERVATION AND MEASURE

## 2019-05-07 PROCEDURE — ? ADDITIONAL NOTES

## 2019-05-07 ASSESSMENT — LOCATION DETAILED DESCRIPTION DERM
LOCATION DETAILED: 3RD WEB SPACE RIGHT HAND
LOCATION DETAILED: LEFT ULNAR DORSAL HAND
LOCATION DETAILED: LEFT DISTAL CALF

## 2019-05-07 ASSESSMENT — LOCATION SIMPLE DESCRIPTION DERM
LOCATION SIMPLE: LEFT CALF
LOCATION SIMPLE: RIGHT HAND
LOCATION SIMPLE: LEFT HAND

## 2019-05-07 ASSESSMENT — LOCATION ZONE DERM
LOCATION ZONE: LEG
LOCATION ZONE: HAND

## 2019-05-07 NOTE — PROCEDURE: FOLLOW UP FOR NEXT VISIT
Scheduled For Follow Up In (Optional): 6 months
Instructions (Optional): Annual FBE
Detail Level: Simple

## 2019-05-07 NOTE — HPI: SKIN LESION
Is This A New Presentation, Or A Follow-Up?: Sun Spot
How Severe Is Your Skin Lesion?: mild
Has Your Skin Lesion Been Treated?: not been treated

## 2019-06-13 ENCOUNTER — HOSPITAL ENCOUNTER (OUTPATIENT)
Facility: MEDICAL CENTER | Age: 84
End: 2019-06-13
Attending: INTERNAL MEDICINE
Payer: MEDICARE

## 2019-06-13 ENCOUNTER — NON-PROVIDER VISIT (OUTPATIENT)
Dept: INTERNAL MEDICINE | Facility: IMAGING CENTER | Age: 84
End: 2019-06-13
Payer: MEDICARE

## 2019-06-13 ENCOUNTER — HOSPITAL ENCOUNTER (OUTPATIENT)
Facility: MEDICAL CENTER | Age: 84
End: 2019-06-13
Attending: FAMILY MEDICINE
Payer: MEDICARE

## 2019-06-13 DIAGNOSIS — E78.00 PURE HYPERCHOLESTEROLEMIA: ICD-10-CM

## 2019-06-13 DIAGNOSIS — E55.9 VITAMIN D DEFICIENCY: ICD-10-CM

## 2019-06-13 DIAGNOSIS — Z00.00 ROUTINE GENERAL MEDICAL EXAMINATION AT A HEALTH CARE FACILITY: ICD-10-CM

## 2019-06-13 LAB
25(OH)D3 SERPL-MCNC: 26 NG/ML (ref 30–100)
ALBUMIN SERPL BCP-MCNC: 4.5 G/DL (ref 3.2–4.9)
ALBUMIN/GLOB SERPL: 1.6 G/DL
ALP SERPL-CCNC: 107 U/L (ref 30–99)
ALT SERPL-CCNC: 18 U/L (ref 2–50)
ANION GAP SERPL CALC-SCNC: 8 MMOL/L (ref 0–11.9)
AST SERPL-CCNC: 18 U/L (ref 12–45)
BASOPHILS # BLD AUTO: 0.6 % (ref 0–1.8)
BASOPHILS # BLD: 0.03 K/UL (ref 0–0.12)
BILIRUB SERPL-MCNC: 0.6 MG/DL (ref 0.1–1.5)
BUN SERPL-MCNC: 16 MG/DL (ref 8–22)
CALCIUM SERPL-MCNC: 9.4 MG/DL (ref 8.5–10.5)
CHLORIDE SERPL-SCNC: 106 MMOL/L (ref 96–112)
CHOLEST SERPL-MCNC: 262 MG/DL (ref 100–199)
CO2 SERPL-SCNC: 27 MMOL/L (ref 20–33)
CREAT SERPL-MCNC: 0.87 MG/DL (ref 0.5–1.4)
EOSINOPHIL # BLD AUTO: 0.12 K/UL (ref 0–0.51)
EOSINOPHIL NFR BLD: 2.5 % (ref 0–6.9)
ERYTHROCYTE [DISTWIDTH] IN BLOOD BY AUTOMATED COUNT: 44.8 FL (ref 35.9–50)
GLOBULIN SER CALC-MCNC: 2.8 G/DL (ref 1.9–3.5)
GLUCOSE SERPL-MCNC: 100 MG/DL (ref 65–99)
HCT VFR BLD AUTO: 44.5 % (ref 37–47)
HDLC SERPL-MCNC: 64 MG/DL
HGB BLD-MCNC: 14.5 G/DL (ref 12–16)
IMM GRANULOCYTES # BLD AUTO: 0.01 K/UL (ref 0–0.11)
IMM GRANULOCYTES NFR BLD AUTO: 0.2 % (ref 0–0.9)
LDLC SERPL CALC-MCNC: 178 MG/DL
LYMPHOCYTES # BLD AUTO: 2.06 K/UL (ref 1–4.8)
LYMPHOCYTES NFR BLD: 43.7 % (ref 22–41)
MCH RBC QN AUTO: 30.7 PG (ref 27–33)
MCHC RBC AUTO-ENTMCNC: 32.6 G/DL (ref 33.6–35)
MCV RBC AUTO: 94.3 FL (ref 81.4–97.8)
MONOCYTES # BLD AUTO: 0.39 K/UL (ref 0–0.85)
MONOCYTES NFR BLD AUTO: 8.3 % (ref 0–13.4)
NEUTROPHILS # BLD AUTO: 2.1 K/UL (ref 2–7.15)
NEUTROPHILS NFR BLD: 44.7 % (ref 44–72)
NRBC # BLD AUTO: 0 K/UL
NRBC BLD-RTO: 0 /100 WBC
PLATELET # BLD AUTO: 207 K/UL (ref 164–446)
PMV BLD AUTO: 10.2 FL (ref 9–12.9)
POTASSIUM SERPL-SCNC: 3.9 MMOL/L (ref 3.6–5.5)
PROT SERPL-MCNC: 7.3 G/DL (ref 6–8.2)
RBC # BLD AUTO: 4.72 M/UL (ref 4.2–5.4)
SODIUM SERPL-SCNC: 141 MMOL/L (ref 135–145)
TRIGL SERPL-MCNC: 101 MG/DL (ref 0–149)
WBC # BLD AUTO: 4.7 K/UL (ref 4.8–10.8)

## 2019-06-13 PROCEDURE — 80061 LIPID PANEL: CPT

## 2019-06-13 PROCEDURE — 82306 VITAMIN D 25 HYDROXY: CPT

## 2019-06-13 PROCEDURE — 80053 COMPREHEN METABOLIC PANEL: CPT | Mod: GY

## 2019-06-13 PROCEDURE — 85025 COMPLETE CBC W/AUTO DIFF WBC: CPT | Mod: GY

## 2019-06-16 ENCOUNTER — TELEPHONE (OUTPATIENT)
Dept: MEDICAL GROUP | Facility: MEDICAL CENTER | Age: 84
End: 2019-06-16

## 2019-06-16 NOTE — TELEPHONE ENCOUNTER
----- Message from Beatriz Lopez, Med Ass't sent at 6/14/2019  4:46 PM PDT -----  Patient Informed of providers result note. Patient states she is now under [facility] and was unaware that she did some of the labs under your name. States she will contact Dr. Fox to set up an appointment.   Thank you    Beatriz Lopez  Medical Assistant

## 2019-08-12 ENCOUNTER — APPOINTMENT (RX ONLY)
Dept: URBAN - METROPOLITAN AREA CLINIC 36 | Facility: CLINIC | Age: 84
Setting detail: DERMATOLOGY
End: 2019-08-12

## 2019-08-12 DIAGNOSIS — Z41.9 ENCOUNTER FOR PROCEDURE FOR PURPOSES OTHER THAN REMEDYING HEALTH STATE, UNSPECIFIED: ICD-10-CM

## 2019-08-12 PROCEDURE — ? ADDITIONAL NOTES

## 2019-08-26 ENCOUNTER — APPOINTMENT (RX ONLY)
Dept: URBAN - METROPOLITAN AREA CLINIC 20 | Facility: CLINIC | Age: 84
Setting detail: DERMATOLOGY
End: 2019-08-26

## 2019-08-26 ENCOUNTER — APPOINTMENT (RX ONLY)
Dept: URBAN - METROPOLITAN AREA CLINIC 36 | Facility: CLINIC | Age: 84
Setting detail: DERMATOLOGY
End: 2019-08-26

## 2019-08-26 DIAGNOSIS — Z41.9 ENCOUNTER FOR PROCEDURE FOR PURPOSES OTHER THAN REMEDYING HEALTH STATE, UNSPECIFIED: ICD-10-CM

## 2019-08-26 PROCEDURE — ? MICROBLADING

## 2019-08-26 NOTE — PROCEDURE: MICROBLADING
Intro And Consent: Although 3D Eyebrow Embroidery is effective in most cases, no guarantee can be made that a specific client will benefit from the procedure. This is a process of inserting pigment into the epidermis of the skin and is a form of semi-permanent tattooing. All instruments which enter the skin or come in contact with body fluids are disposable and disposed of after the visit. Cross contamination guidelines are strictly adhered to. Generally, the results are excellent. However, a perfect result is not a realistic expectation during the first appointment. It is usually to expect a touch up after healing is completed. Initially the color will appear much more vibrant or darker compared to the end result. Usually within 7 days the color will fade 40-50 percent, soften and look more natural. The pigment is semi-permanent and will fade over time and will likely need to be touched up once or twice a year. The procedure was thoroughly explained and the patient expressed their verbal understanding.
Detail Level: Zone
Procedure Text: Brows were cleansed with an alcohol swab. The brow hair was shaped with tweezers prior to proceeding. The area was lightly exfoliated with a buffer to prepare for the topical anesthesia. Topical anesthesia was applied and covered for 20 minutes. Color was applied with a microbrush to one brow and then the other. The areas were wiped with dry cotton and then process was repeated. The average number of passes is 3-4. Vaseline ointment was applied after the procedure was completed. Detailed post-care and follow-up instructions were discussed.

## 2019-09-30 ENCOUNTER — APPOINTMENT (RX ONLY)
Dept: URBAN - METROPOLITAN AREA CLINIC 36 | Facility: CLINIC | Age: 84
Setting detail: DERMATOLOGY
End: 2019-09-30

## 2019-09-30 DIAGNOSIS — Z41.9 ENCOUNTER FOR PROCEDURE FOR PURPOSES OTHER THAN REMEDYING HEALTH STATE, UNSPECIFIED: ICD-10-CM

## 2019-09-30 PROCEDURE — ? ADDITIONAL NOTES

## 2019-09-30 NOTE — PROCEDURE: ADDITIONAL NOTES
Detail Level: Simple
Additional Notes: PT had perfecting session for micro blading. PT was compliant I’m pre-and post care

## 2019-12-12 ENCOUNTER — OFFICE VISIT (OUTPATIENT)
Dept: INTERNAL MEDICINE | Facility: IMAGING CENTER | Age: 84
End: 2019-12-12
Payer: MEDICARE

## 2019-12-12 VITALS
HEIGHT: 64 IN | RESPIRATION RATE: 14 BRPM | HEART RATE: 72 BPM | DIASTOLIC BLOOD PRESSURE: 70 MMHG | OXYGEN SATURATION: 96 % | WEIGHT: 131 LBS | BODY MASS INDEX: 22.36 KG/M2 | SYSTOLIC BLOOD PRESSURE: 166 MMHG | TEMPERATURE: 98.5 F

## 2019-12-12 DIAGNOSIS — R03.0 ELEVATED BLOOD PRESSURE, SITUATIONAL: ICD-10-CM

## 2019-12-12 DIAGNOSIS — I48.11 LONGSTANDING PERSISTENT ATRIAL FIBRILLATION (HCC): Chronic | ICD-10-CM

## 2019-12-12 DIAGNOSIS — M81.0 OSTEOPOROSIS WITHOUT CURRENT PATHOLOGICAL FRACTURE, UNSPECIFIED OSTEOPOROSIS TYPE: ICD-10-CM

## 2019-12-12 DIAGNOSIS — Z00.00 MEDICARE ANNUAL WELLNESS VISIT, SUBSEQUENT: ICD-10-CM

## 2019-12-12 DIAGNOSIS — E78.00 PURE HYPERCHOLESTEROLEMIA: ICD-10-CM

## 2019-12-12 PROCEDURE — G0439 PPPS, SUBSEQ VISIT: HCPCS | Performed by: INTERNAL MEDICINE

## 2019-12-12 ASSESSMENT — PATIENT HEALTH QUESTIONNAIRE - PHQ9: CLINICAL INTERPRETATION OF PHQ2 SCORE: 0

## 2019-12-12 ASSESSMENT — ACTIVITIES OF DAILY LIVING (ADL): BATHING_REQUIRES_ASSISTANCE: 0

## 2019-12-12 ASSESSMENT — ENCOUNTER SYMPTOMS: GENERAL WELL-BEING: EXCELLENT

## 2019-12-12 NOTE — PROGRESS NOTES
"CC: follow up    HPI:   Chra presents today with the following. She feels \"great\"!    1. Longstanding persistent atrial fibrillation  She has no symptoms; no palpitations nor SOB. She is aware that she has had a fib for past several years. She has reviewed this with a cardiologist in the past. She does not want to take any medication.She is aware that there is a higher risk for stroke.    2. Elevated blood pressure, situational  She has followed her own BP with home monitoring. She says it is higher now because she has been stressed over moving her and her  to a different house.    3. Pure hypercholesterolemia  Longstanding issue. She does not want to take any meds for this.    4. Osteoporosis without current pathological fracture, unspecified osteoporosis type  She is not taking anything. She does remain physically active and is cautious about potential situations that are risky.    5. Medicare annual wellness visit, subsequent        Patient Active Problem List    Diagnosis Date Noted   • Longstanding persistent atrial fibrillation 12/12/2019   • History of recurrent UTIs 01/10/2019   • Vitamin D deficiency 09/25/2018   • Risk for falls 08/21/2018   • Prediabetes 06/29/2017   • Systolic murmur 06/29/2017   • Osteoporosis 08/24/2016   • Elevated blood pressure, situational 08/24/2016   • Pure hypercholesterolemia 08/24/2016   • H/O bilateral cataract extraction 08/24/2016       Past medical, surgical, family, and social history was reviewed and updated in EPIC chart by me today.     Medications and allergies reviewed and updated in EPIC chart by me today.     Labs and applicable imaging discussed and reviewed with patient.     ROS: Pertinent positives listed above in HPI. All other systems have been reviewed and are stable.     No current outpatient medications on file.     No current facility-administered medications for this visit.          Allergies as of 12/12/2019 - Reviewed 12/12/2019   Allergen " "Reaction Noted   • Soy allergy  09/19/2017        BP (!) 166/70 (BP Location: Left arm, Patient Position: Sitting, BP Cuff Size: Adult)   Pulse 72   Temp 36.9 °C (98.5 °F) (Temporal)   Resp 14   Ht 1.626 m (5' 4\")   Wt 59.4 kg (131 lb)   SpO2 96%   BMI 22.49 kg/m²     Physical Exam:  Gen:      Alert and oriented, No apparent distress.  HEET:   No Lymphadenopathy or Bruits.  NECK:  No adenopathy; no thyromegaly  Lungs:  Clear to auscultation bilaterally  CV:       Irregular. Ventricular rate ~ 70. Systolic murmur.         Ext:       No clubbing, cyanosis, edema.  Skin:     Warm and dry; no rashes noted  Neuro:  No focal deficits noted  Psych:  AAOx4; mood and affect are appropriate. Conversation is excellent.      Assessment and Plan.   89 y.o. female with the following issues.    1. Longstanding persistent atrial fibrillation  As per HPI. No symptoms; doesn`t want to take medication.    2. Elevated blood pressure, situational  She will montior.    3. Pure hypercholesterolemia  As above. She does not feel she needs any medication.    4. Osteoporosis without current pathological fracture, unspecified osteoporosis type  Discussed and have asked her to take Vitamin D3 and calcium. Her D3 levels were low.    5. Medicare annual wellness visit, subsequent      Depression Screening    Little interest or pleasure in doing things?  0 - not at all  Feeling down, depressed , or hopeless? 0 - not at all  Patient Health Questionnaire Score: 0     If depressive symptoms identified deferred to follow up visit unless specifically addressed in assessment and plan.    Interpretation of PHQ-9 Total Score   Score Severity   1-4 No Depression   5-9 Mild Depression   10-14 Moderate Depression   15-19 Moderately Severe Depression   20-27 Severe Depression    Screening for Cognitive Impairment    Three Minute Recall (village, kitchen, baby) 3/3    Otto clock face with all 12 numbers and set the hands to show 10 past 10.  Yes  "   Cognitive concerns identified deferred for follow up unless specifically addressed in assessment and plan.    Fall Risk Assessment    Has the patient had two or more falls in the last year or any fall with injury in the last year?  No    Safety Assessment    Throw rugs on floor.  No  Handrails on all stairs.  Yes  Good lighting in all hallways.  Yes  Difficulty hearing.  No  Patient counseled about all safety risks that were identified.    Functional Assessment ADLs    Are there any barriers preventing you from cooking for yourself or meeting nutritional needs?  No.    Are there any barriers preventing you from driving safely or obtaining transportation?  No.    Are there any barriers preventing you from using a telephone or calling for help?  No.    Are there any barriers preventing you from shopping?  No.    Are there any barriers preventing you from taking care of your own finances?  No.    Are there any barriers preventing you from managing your medications?  No.    Are there any barriers preventing you from showering, bathing or dressing yourself?  No.    Are you currently engaging in any exercise or physical activity?  Yes.     What is your perception of your health?  Excellent.      Health Maintenance Summary                Annual Wellness Visit Overdue 8/22/2019      Done 8/21/2018 Visit Dx: Medicare annual wellness visit, subsequent     Patient has more history with this topic...    BONE DENSITY Next Due 7/12/2022      Done 7/12/2017 DS-BONE DENSITY STUDY (DEXA)    IMM DTaP/Tdap/Td Vaccine Next Due 3/22/2024      Done 3/22/2014 Imm Admin: Tdap Vaccine     Patient has more history with this topic...          Patient Care Team:  Ras Fox M.D. as PCP - General (Internal Medicine)

## 2020-02-27 ENCOUNTER — APPOINTMENT (RX ONLY)
Dept: URBAN - METROPOLITAN AREA CLINIC 20 | Facility: CLINIC | Age: 85
Setting detail: DERMATOLOGY
End: 2020-02-27

## 2020-02-27 DIAGNOSIS — L81.4 OTHER MELANIN HYPERPIGMENTATION: ICD-10-CM

## 2020-02-27 DIAGNOSIS — L57.8 OTHER SKIN CHANGES DUE TO CHRONIC EXPOSURE TO NONIONIZING RADIATION: ICD-10-CM

## 2020-02-27 DIAGNOSIS — L82.1 OTHER SEBORRHEIC KERATOSIS: ICD-10-CM

## 2020-02-27 DIAGNOSIS — L57.0 ACTINIC KERATOSIS: ICD-10-CM

## 2020-02-27 DIAGNOSIS — D22 MELANOCYTIC NEVI: ICD-10-CM

## 2020-02-27 DIAGNOSIS — D18.0 HEMANGIOMA: ICD-10-CM

## 2020-02-27 DIAGNOSIS — Z87.2 PERSONAL HISTORY OF DISEASES OF THE SKIN AND SUBCUTANEOUS TISSUE: ICD-10-CM

## 2020-02-27 PROBLEM — D22.5 MELANOCYTIC NEVI OF TRUNK: Status: ACTIVE | Noted: 2020-02-27

## 2020-02-27 PROBLEM — D48.5 NEOPLASM OF UNCERTAIN BEHAVIOR OF SKIN: Status: ACTIVE | Noted: 2020-02-27

## 2020-02-27 PROBLEM — D22.72 MELANOCYTIC NEVI OF LEFT LOWER LIMB, INCLUDING HIP: Status: ACTIVE | Noted: 2020-02-27

## 2020-02-27 PROBLEM — D18.01 HEMANGIOMA OF SKIN AND SUBCUTANEOUS TISSUE: Status: ACTIVE | Noted: 2020-02-27

## 2020-02-27 PROCEDURE — ? COUNSELING

## 2020-02-27 PROCEDURE — ? BIOPSY BY SHAVE METHOD

## 2020-02-27 PROCEDURE — 99214 OFFICE O/P EST MOD 30 MIN: CPT | Mod: 25

## 2020-02-27 PROCEDURE — 69100 BIOPSY OF EXTERNAL EAR: CPT

## 2020-02-27 PROCEDURE — ? OBSERVATION AND MEASURE

## 2020-02-27 ASSESSMENT — LOCATION ZONE DERM
LOCATION ZONE: LEG
LOCATION ZONE: TRUNK
LOCATION ZONE: EAR
LOCATION ZONE: ARM
LOCATION ZONE: FACE

## 2020-02-27 ASSESSMENT — LOCATION DETAILED DESCRIPTION DERM
LOCATION DETAILED: RIGHT SUPERIOR MEDIAL UPPER BACK
LOCATION DETAILED: RIGHT ANTERIOR DISTAL THIGH
LOCATION DETAILED: RIGHT MID-UPPER BACK
LOCATION DETAILED: LEFT INFERIOR CENTRAL MALAR CHEEK
LOCATION DETAILED: RIGHT ANTIHELIX
LOCATION DETAILED: LEFT DISTAL CALF
LOCATION DETAILED: LEFT PROXIMAL DORSAL FOREARM
LOCATION DETAILED: RIGHT DISTAL POSTERIOR UPPER ARM
LOCATION DETAILED: RIGHT INFERIOR UPPER BACK
LOCATION DETAILED: RIGHT CENTRAL MALAR CHEEK
LOCATION DETAILED: RIGHT PROXIMAL DORSAL FOREARM
LOCATION DETAILED: LEFT MEDIAL SUPERIOR CHEST
LOCATION DETAILED: LEFT ANTERIOR PROXIMAL UPPER ARM
LOCATION DETAILED: RIGHT ANTERIOR PROXIMAL UPPER ARM
LOCATION DETAILED: LEFT ANTERIOR DISTAL THIGH

## 2020-02-27 ASSESSMENT — LOCATION SIMPLE DESCRIPTION DERM
LOCATION SIMPLE: LEFT UPPER ARM
LOCATION SIMPLE: RIGHT UPPER ARM
LOCATION SIMPLE: CHEST
LOCATION SIMPLE: RIGHT POSTERIOR UPPER ARM
LOCATION SIMPLE: RIGHT FOREARM
LOCATION SIMPLE: RIGHT CHEEK
LOCATION SIMPLE: LEFT CALF
LOCATION SIMPLE: LEFT THIGH
LOCATION SIMPLE: RIGHT EAR
LOCATION SIMPLE: RIGHT THIGH
LOCATION SIMPLE: LEFT CHEEK
LOCATION SIMPLE: LEFT FOREARM
LOCATION SIMPLE: RIGHT UPPER BACK

## 2020-12-11 ENCOUNTER — HOSPITAL ENCOUNTER (OUTPATIENT)
Facility: MEDICAL CENTER | Age: 85
End: 2020-12-11
Attending: FAMILY MEDICINE
Payer: MEDICARE

## 2020-12-11 ENCOUNTER — NON-PROVIDER VISIT (OUTPATIENT)
Dept: INTERNAL MEDICINE | Facility: IMAGING CENTER | Age: 85
End: 2020-12-11
Payer: MEDICARE

## 2020-12-11 DIAGNOSIS — Z00.00 HEALTH CARE MAINTENANCE: ICD-10-CM

## 2020-12-11 DIAGNOSIS — E55.9 VITAMIN D DEFICIENCY: ICD-10-CM

## 2020-12-11 DIAGNOSIS — E78.2 MIXED DYSLIPIDEMIA: ICD-10-CM

## 2020-12-11 DIAGNOSIS — R53.83 OTHER FATIGUE: ICD-10-CM

## 2020-12-11 DIAGNOSIS — R73.9 HYPERGLYCEMIA: ICD-10-CM

## 2020-12-11 LAB
ALBUMIN SERPL BCP-MCNC: 4.6 G/DL (ref 3.2–4.9)
ALBUMIN/GLOB SERPL: 1.7 G/DL
ALP SERPL-CCNC: 98 U/L (ref 30–99)
ALT SERPL-CCNC: 10 U/L (ref 2–50)
ANION GAP SERPL CALC-SCNC: 11 MMOL/L (ref 7–16)
AST SERPL-CCNC: 18 U/L (ref 12–45)
BASOPHILS # BLD AUTO: 0.7 % (ref 0–1.8)
BASOPHILS # BLD: 0.04 K/UL (ref 0–0.12)
BILIRUB SERPL-MCNC: 0.5 MG/DL (ref 0.1–1.5)
BUN SERPL-MCNC: 12 MG/DL (ref 8–22)
CALCIUM SERPL-MCNC: 9.4 MG/DL (ref 8.5–10.5)
CHLORIDE SERPL-SCNC: 105 MMOL/L (ref 96–112)
CHOLEST SERPL-MCNC: 278 MG/DL (ref 100–199)
CO2 SERPL-SCNC: 25 MMOL/L (ref 20–33)
CREAT SERPL-MCNC: 0.87 MG/DL (ref 0.5–1.4)
EOSINOPHIL # BLD AUTO: 0.18 K/UL (ref 0–0.51)
EOSINOPHIL NFR BLD: 3.1 % (ref 0–6.9)
ERYTHROCYTE [DISTWIDTH] IN BLOOD BY AUTOMATED COUNT: 46 FL (ref 35.9–50)
GLOBULIN SER CALC-MCNC: 2.7 G/DL (ref 1.9–3.5)
GLUCOSE SERPL-MCNC: 93 MG/DL (ref 65–99)
HCT VFR BLD AUTO: 47 % (ref 37–47)
HDLC SERPL-MCNC: 64 MG/DL
HGB BLD-MCNC: 14.5 G/DL (ref 12–16)
IMM GRANULOCYTES # BLD AUTO: 0.02 K/UL (ref 0–0.11)
IMM GRANULOCYTES NFR BLD AUTO: 0.3 % (ref 0–0.9)
LDLC SERPL CALC-MCNC: 191 MG/DL
LYMPHOCYTES # BLD AUTO: 2.02 K/UL (ref 1–4.8)
LYMPHOCYTES NFR BLD: 34.3 % (ref 22–41)
MCH RBC QN AUTO: 29.2 PG (ref 27–33)
MCHC RBC AUTO-ENTMCNC: 30.9 G/DL (ref 33.6–35)
MCV RBC AUTO: 94.6 FL (ref 81.4–97.8)
MONOCYTES # BLD AUTO: 0.49 K/UL (ref 0–0.85)
MONOCYTES NFR BLD AUTO: 8.3 % (ref 0–13.4)
NEUTROPHILS # BLD AUTO: 3.14 K/UL (ref 2–7.15)
NEUTROPHILS NFR BLD: 53.3 % (ref 44–72)
NRBC # BLD AUTO: 0 K/UL
NRBC BLD-RTO: 0 /100 WBC
PLATELET # BLD AUTO: 246 K/UL (ref 164–446)
PMV BLD AUTO: 10.4 FL (ref 9–12.9)
POTASSIUM SERPL-SCNC: 3.9 MMOL/L (ref 3.6–5.5)
PROT SERPL-MCNC: 7.3 G/DL (ref 6–8.2)
RBC # BLD AUTO: 4.97 M/UL (ref 4.2–5.4)
SODIUM SERPL-SCNC: 141 MMOL/L (ref 135–145)
TRIGL SERPL-MCNC: 117 MG/DL (ref 0–149)
TSH SERPL DL<=0.005 MIU/L-ACNC: 2.67 UIU/ML (ref 0.38–5.33)
WBC # BLD AUTO: 5.9 K/UL (ref 4.8–10.8)

## 2020-12-11 PROCEDURE — 83036 HEMOGLOBIN GLYCOSYLATED A1C: CPT

## 2020-12-11 PROCEDURE — 80053 COMPREHEN METABOLIC PANEL: CPT

## 2020-12-11 PROCEDURE — 80061 LIPID PANEL: CPT

## 2020-12-11 PROCEDURE — 84443 ASSAY THYROID STIM HORMONE: CPT

## 2020-12-11 PROCEDURE — 82306 VITAMIN D 25 HYDROXY: CPT

## 2020-12-11 PROCEDURE — 85025 COMPLETE CBC W/AUTO DIFF WBC: CPT

## 2020-12-12 LAB
25(OH)D3 SERPL-MCNC: 31 NG/ML (ref 30–100)
EST. AVERAGE GLUCOSE BLD GHB EST-MCNC: 128 MG/DL
HBA1C MFR BLD: 6.1 % (ref 0–5.6)

## 2020-12-15 ENCOUNTER — OFFICE VISIT (OUTPATIENT)
Dept: INTERNAL MEDICINE | Facility: IMAGING CENTER | Age: 85
End: 2020-12-15
Payer: MEDICARE

## 2020-12-15 VITALS
HEART RATE: 72 BPM | WEIGHT: 130 LBS | SYSTOLIC BLOOD PRESSURE: 152 MMHG | OXYGEN SATURATION: 94 % | TEMPERATURE: 98.6 F | DIASTOLIC BLOOD PRESSURE: 78 MMHG | HEIGHT: 63 IN | BODY MASS INDEX: 23.04 KG/M2 | RESPIRATION RATE: 17 BRPM

## 2020-12-15 DIAGNOSIS — E78.2 MIXED DYSLIPIDEMIA: ICD-10-CM

## 2020-12-15 DIAGNOSIS — Z00.00 ENCOUNTER FOR MEDICARE ANNUAL WELLNESS EXAM: ICD-10-CM

## 2020-12-15 DIAGNOSIS — R03.0 ELEVATED BLOOD PRESSURE, SITUATIONAL: ICD-10-CM

## 2020-12-15 DIAGNOSIS — I48.0 PAF (PAROXYSMAL ATRIAL FIBRILLATION) (HCC): ICD-10-CM

## 2020-12-15 DIAGNOSIS — R01.1 SYSTOLIC MURMUR: ICD-10-CM

## 2020-12-15 DIAGNOSIS — M81.0 AGE-RELATED OSTEOPOROSIS WITHOUT CURRENT PATHOLOGICAL FRACTURE: ICD-10-CM

## 2020-12-15 DIAGNOSIS — R73.09 ELEVATED HEMOGLOBIN A1C: ICD-10-CM

## 2020-12-15 DIAGNOSIS — E55.9 VITAMIN D DEFICIENCY: ICD-10-CM

## 2020-12-15 PROBLEM — R73.03 PREDIABETES: Status: RESOLVED | Noted: 2017-06-29 | Resolved: 2020-12-15

## 2020-12-15 PROBLEM — Z91.81 RISK FOR FALLS: Status: RESOLVED | Noted: 2018-08-21 | Resolved: 2020-12-15

## 2020-12-15 PROBLEM — I48.11 LONGSTANDING PERSISTENT ATRIAL FIBRILLATION (HCC): Chronic | Status: RESOLVED | Noted: 2019-12-12 | Resolved: 2020-12-15

## 2020-12-15 PROCEDURE — G0439 PPPS, SUBSEQ VISIT: HCPCS | Performed by: FAMILY MEDICINE

## 2020-12-15 ASSESSMENT — PATIENT HEALTH QUESTIONNAIRE - PHQ9: CLINICAL INTERPRETATION OF PHQ2 SCORE: 0

## 2020-12-15 ASSESSMENT — ACTIVITIES OF DAILY LIVING (ADL): BATHING_REQUIRES_ASSISTANCE: 0

## 2020-12-15 ASSESSMENT — FIBROSIS 4 INDEX: FIB4 SCORE: 2.08

## 2020-12-15 ASSESSMENT — ENCOUNTER SYMPTOMS: GENERAL WELL-BEING: EXCELLENT

## 2020-12-16 NOTE — PROGRESS NOTES
CC:   Medicare Annual Wellness Visit    HPI:  Char is a 89 y.o. female here for her Medicare Annual Wellness Visit and to review labs done 12/11/2020. She is transferring care from Dr. Fox due to his skilled nursing and feels well overall. She has chronic PAF without current symptoms and declines current cardiology follow-up nor daily medication use. She also has chronic situated blood pressure readings when in public and has monitored home BP in past with normalization of readings repeatedly. She has history of osteoporosis without current fracture and declines repeat dexa scan/ treatment medications/ supplements. She has chronic HgA1c elevation without DM diagnosis, and chronic mixed dyslipidemia since age 30 (decline statin use). She continues to exercise 4-5 days per week and is currently focused on her 's declining memory. They recently moved into a home at [facility], and she has assumed all bill paying responsibilities for their house hold. She is generally stressed but declines need for intervention.     Patient Active Problem List    Diagnosis Date Noted   • PAF (paroxysmal atrial fibrillation) (Formerly [facility]) 12/15/2020   • Mixed dyslipidemia 12/15/2020   • Elevated hemoglobin A1c 12/15/2020   • History of recurrent UTIs 01/10/2019   • Vitamin D deficiency 09/25/2018   • Systolic murmur 06/29/2017   • Osteoporosis 08/24/2016   • Elevated blood pressure, situational 08/24/2016   • H/O bilateral cataract extraction 08/24/2016     No current outpatient medications on file.     No current facility-administered medications for this visit.       Current supplements: none  Chronic narcotic pain medicines: no  Allergies: Soy allergy  Exercise: yes  Current social contact/activities: none due to COVID  Current mood: good  Advance Directive on file: no    Screening:  Depression Screening    Little interest or pleasure in doing things?  0 - not at all  Feeling down, depressed , or hopeless? 0 - not at all  Patient Health  Questionnaire Score: 0     Screening for Cognitive Impairment    Three Minute Recall (river, nation, finger) 3/3    Otto clock face with all 12 numbers and set the hands to show 10 past 11.  Yes    Cognitive concerns identified deferred for follow up unless specifically addressed in assessment and plan.    Fall Risk Assessment    Has the patient had two or more falls in the last year or any fall with injury in the last year?  No    Safety Assessment    Throw rugs on floor.  No  Handrails on all stairs.  Yes  Good lighting in all hallways.  Yes  Difficulty hearing.  No  Patient counseled about all safety risks that were identified.    Functional Assessment ADLs    Are there any barriers preventing you from cooking for yourself or meeting nutritional needs?  No.    Are there any barriers preventing you from driving safely or obtaining transportation?  No.    Are there any barriers preventing you from using a telephone or calling for help?  No.    Are there any barriers preventing you from shopping?  No.    Are there any barriers preventing you from taking care of your own finances?  No.    Are there any barriers preventing you from managing your medications?  No.    Are there any barriers preventing you from showering, bathing or dressing yourself?  No.    Are you currently engaging in any exercise or physical activity?  Yes.     What is your perception of your health?  Excellent.      Health Maintenance Summary                Annual Wellness Visit Next Due 12/16/2021      Done 12/15/2020 Visit Dx: Encounter for Medicare annual wellness exam     Patient has more history with this topic...    BONE DENSITY Next Due 7/12/2022      Done 7/12/2017 DS-BONE DENSITY STUDY (DEXA)    IMM DTaP/Tdap/Td Vaccine Next Due 3/22/2024      Done 3/22/2014 Imm Admin: Tdap Vaccine     Patient has more history with this topic...          Patient Care Team:  Gisella Jenkins M.D. as PCP - General (Family Medicine)      Social History  "    Tobacco Use   • Smoking status: Never Smoker   • Smokeless tobacco: Never Used   Substance Use Topics   • Alcohol use: No     Alcohol/week: 0.0 oz   • Drug use: No     Family History   Family history unknown: Yes     She  has a past medical history of Allergy, Arrhythmia (2017), Cataract, History of recurrent UTIs (1/10/2019), and Hyperlipidemia.   Past Surgical History:   Procedure Laterality Date   • HIP NAILING INTRAMEDULLARY Left 5/30/2017    Procedure: HIP NAILING INTRAMEDULLARY;  Surgeon: Zion Pimentel M.D.;  Location: SURGERY [facility];  Service:    • OPEN REDUCTION Left 2016    hip fracture   • MAMMOPLASTY AUGMENTATION  age 30's       ROS:    All positives noted in HPI. All others reviewed and are negative.    Ostomy or other tubes or amputations: no  Chronic oxygen use: no  Last eye exam: UTD with Dr. Burrell  : denies urinary incontinence; does not interfere with ADLs/ sleep  Gait: stable  Problems with balance/ difficulty walking: no  Hearing: good  Dentition: adequate    Exam:   /78 (BP Location: Left arm, Patient Position: Sitting, BP Cuff Size: Adult)   Pulse 72   Temp 37 °C (98.6 °F) (Temporal)   Resp 17   Ht 1.6 m (5' 3\")   Wt 59 kg (130 lb)   SpO2 94%  Body mass index is 23.03 kg/m².    Gen: Well developed; well nourished; no acute distress; age appropriate appearance   HEENT: Normocephalic; atraumatic; PEERLA b/l; sclera clear b/l; b/l external auditory canals WNL; b/l TM WNL; nares patent; oropharynx clear; mask in place  Neck: No adenopathy; no thyromegaly  CV: Regular rate and rhythm; S1/ S2 present; systolic murmur/ no gallop or rub noted  Pulm: No respiratory distress; clear to ascultation b/l; no wheezing or stridor noted b/l  Abd: Adequate bowel sounds noted; soft and nontender; no rebound, rigidity, nor distention   Extremities: No peripheral edema b/l LE extremities/ no clubbing nor cyanosis noted  Skin: Warm and dry; no rashes noted   Neuro: No focal deficits " "noted; pt is able to get up out of chair unassisted and walk forward  Psych: AAOx4; mood and affect are appropriate    Assessment and Plan:  1. PAF (paroxysmal atrial fibrillation) (HCC)  Stable/ patient is in NSR today and she has historically declined daily medication use (inlcuding ASA and blood thinners)/ not interested in Cardiology follow-up.   - Subsequent Annual Wellness Visit - Includes PPPS ()    2. Age-related osteoporosis without current pathological fracture  Stable/ patient declines treatment medication/ declines supplements and is not interested in repeat dexa scan.   - Subsequent Annual Wellness Visit - Includes PPPS ()    3. Elevated blood pressure, situational  Ongoing issue for patient in public situations. Recommend patient resume home BP monitoring daily and update me accordingly.   - Subsequent Annual Wellness Visit - Includes PPPS ()    4. Mixed dyslipidemia  Uncontrolled/ patient reports having this issue since age 30 and has never been interested in statin use.   - Subsequent Annual Wellness Visit - Includes PPPS ()    5. Systolic murmur  Stable/ mild on exam  - Subsequent Annual Wellness Visit - Includes PPPS ()    6. Vitamin D deficiency  Improved/ Vitamin D level is now 31 and patient declines Vitamin D supplementation due to nausea issues.   - Subsequent Annual Wellness Visit - Includes PPPS ()    7. Elevated hemoglobin A1c  Uncontrolled/ HgA1c is 6.1% and patient is well informed about this condition/ how she can modulate this condition.   - Subsequent Annual Wellness Visit - Includes PPPS ()    8. Encounter for Medicare annual wellness exam  Patient is stable at this time and prefers \"less is more\" approach to her healthcare. She will contact our office as needs arise.   - Subsequent Annual Wellness Visit - Includes PPPS ()       Services needed: no new services needed at this time  Health Care Screening: recommendations as per orders if " indicated.  Referrals offered: none  Counseling provided today:  · Prevent falls and reduce trip hazards; Secure or remove rugs if present   · Maintain working fire alarm and carbon monoxide detectors   · Engage in regular physical activity daily and social activities weekly as tolerated

## 2021-01-11 DIAGNOSIS — Z23 NEED FOR VACCINATION: ICD-10-CM

## 2021-01-21 ENCOUNTER — IMMUNIZATION (OUTPATIENT)
Dept: FAMILY PLANNING/WOMEN'S HEALTH CLINIC | Facility: IMMUNIZATION CENTER | Age: 86
End: 2021-01-21
Payer: MEDICARE

## 2021-01-21 DIAGNOSIS — Z23 ENCOUNTER FOR VACCINATION: Primary | ICD-10-CM

## 2021-01-21 PROCEDURE — 0001A PFIZER SARS-COV-2 VACCINE: CPT

## 2021-01-21 PROCEDURE — 91300 PFIZER SARS-COV-2 VACCINE: CPT

## 2021-02-11 ENCOUNTER — IMMUNIZATION (OUTPATIENT)
Dept: FAMILY PLANNING/WOMEN'S HEALTH CLINIC | Facility: IMMUNIZATION CENTER | Age: 86
End: 2021-02-11
Attending: INTERNAL MEDICINE
Payer: MEDICARE

## 2021-02-11 DIAGNOSIS — Z23 ENCOUNTER FOR VACCINATION: Primary | ICD-10-CM

## 2021-02-11 PROCEDURE — 0002A PFIZER SARS-COV-2 VACCINE: CPT

## 2021-02-11 PROCEDURE — 91300 PFIZER SARS-COV-2 VACCINE: CPT

## 2021-05-11 ENCOUNTER — APPOINTMENT (RX ONLY)
Dept: URBAN - METROPOLITAN AREA CLINIC 20 | Facility: CLINIC | Age: 86
Setting detail: DERMATOLOGY
End: 2021-05-11

## 2021-05-11 DIAGNOSIS — Z87.2 PERSONAL HISTORY OF DISEASES OF THE SKIN AND SUBCUTANEOUS TISSUE: ICD-10-CM

## 2021-05-11 DIAGNOSIS — L57.0 ACTINIC KERATOSIS: ICD-10-CM

## 2021-05-11 DIAGNOSIS — L81.4 OTHER MELANIN HYPERPIGMENTATION: ICD-10-CM

## 2021-05-11 DIAGNOSIS — D18.0 HEMANGIOMA: ICD-10-CM

## 2021-05-11 DIAGNOSIS — D22 MELANOCYTIC NEVI: ICD-10-CM

## 2021-05-11 DIAGNOSIS — L57.8 OTHER SKIN CHANGES DUE TO CHRONIC EXPOSURE TO NONIONIZING RADIATION: ICD-10-CM

## 2021-05-11 DIAGNOSIS — L82.1 OTHER SEBORRHEIC KERATOSIS: ICD-10-CM

## 2021-05-11 PROBLEM — D18.01 HEMANGIOMA OF SKIN AND SUBCUTANEOUS TISSUE: Status: ACTIVE | Noted: 2021-05-11

## 2021-05-11 PROBLEM — D48.5 NEOPLASM OF UNCERTAIN BEHAVIOR OF SKIN: Status: ACTIVE | Noted: 2021-05-11

## 2021-05-11 PROBLEM — D22.5 MELANOCYTIC NEVI OF TRUNK: Status: ACTIVE | Noted: 2021-05-11

## 2021-05-11 PROBLEM — D22.72 MELANOCYTIC NEVI OF LEFT LOWER LIMB, INCLUDING HIP: Status: ACTIVE | Noted: 2021-05-11

## 2021-05-11 PROCEDURE — 17000 DESTRUCT PREMALG LESION: CPT | Mod: 59

## 2021-05-11 PROCEDURE — ? LIQUID NITROGEN

## 2021-05-11 PROCEDURE — ? OBSERVATION AND MEASURE

## 2021-05-11 PROCEDURE — ? COUNSELING

## 2021-05-11 PROCEDURE — 99213 OFFICE O/P EST LOW 20 MIN: CPT | Mod: 25

## 2021-05-11 PROCEDURE — ? BIOPSY BY SHAVE METHOD

## 2021-05-11 PROCEDURE — 11102 TANGNTL BX SKIN SINGLE LES: CPT

## 2021-05-11 ASSESSMENT — LOCATION SIMPLE DESCRIPTION DERM
LOCATION SIMPLE: RIGHT FOREHEAD
LOCATION SIMPLE: RIGHT POSTERIOR UPPER ARM
LOCATION SIMPLE: RIGHT UPPER ARM
LOCATION SIMPLE: RIGHT CHEEK
LOCATION SIMPLE: LEFT CALF
LOCATION SIMPLE: LEFT UPPER ARM
LOCATION SIMPLE: CHEST
LOCATION SIMPLE: RIGHT THIGH
LOCATION SIMPLE: LEFT CHEEK
LOCATION SIMPLE: LEFT FOREARM
LOCATION SIMPLE: RIGHT UPPER BACK
LOCATION SIMPLE: LEFT THIGH
LOCATION SIMPLE: RIGHT FOREARM

## 2021-05-11 ASSESSMENT — LOCATION DETAILED DESCRIPTION DERM
LOCATION DETAILED: RIGHT INFERIOR UPPER BACK
LOCATION DETAILED: LEFT INFERIOR CENTRAL MALAR CHEEK
LOCATION DETAILED: LEFT DISTAL CALF
LOCATION DETAILED: RIGHT ANTERIOR PROXIMAL UPPER ARM
LOCATION DETAILED: RIGHT MID-UPPER BACK
LOCATION DETAILED: RIGHT CENTRAL MALAR CHEEK
LOCATION DETAILED: RIGHT ANTERIOR DISTAL THIGH
LOCATION DETAILED: LEFT ANTERIOR DISTAL THIGH
LOCATION DETAILED: RIGHT DISTAL POSTERIOR UPPER ARM
LOCATION DETAILED: RIGHT PROXIMAL DORSAL FOREARM
LOCATION DETAILED: RIGHT SUPERIOR MEDIAL UPPER BACK
LOCATION DETAILED: LEFT MEDIAL SUPERIOR CHEST
LOCATION DETAILED: LEFT SUPERIOR LATERAL MALAR CHEEK
LOCATION DETAILED: LEFT PROXIMAL DORSAL FOREARM
LOCATION DETAILED: RIGHT FOREHEAD
LOCATION DETAILED: LEFT ANTERIOR PROXIMAL UPPER ARM

## 2021-05-11 ASSESSMENT — LOCATION ZONE DERM
LOCATION ZONE: TRUNK
LOCATION ZONE: FACE
LOCATION ZONE: LEG
LOCATION ZONE: ARM

## 2021-05-19 ENCOUNTER — RX ONLY (OUTPATIENT)
Age: 86
Setting detail: RX ONLY
End: 2021-05-19

## 2021-05-19 RX ORDER — FLUOROURACIL 5 MG/G
CREAM TOPICAL
Qty: 1 | Refills: 3 | Status: ERX | COMMUNITY
Start: 2021-05-19

## 2021-07-26 ENCOUNTER — APPOINTMENT (RX ONLY)
Dept: URBAN - METROPOLITAN AREA CLINIC 20 | Facility: CLINIC | Age: 86
Setting detail: DERMATOLOGY
End: 2021-07-26

## 2021-07-26 DIAGNOSIS — L82.0 INFLAMED SEBORRHEIC KERATOSIS: ICD-10-CM

## 2021-07-26 DIAGNOSIS — Z09 ENCOUNTER FOR FOLLOW-UP EXAMINATION AFTER COMPLETED TREATMENT FOR CONDITIONS OTHER THAN MALIGNANT NEOPLASM: ICD-10-CM | Status: INADEQUATELY CONTROLLED

## 2021-07-26 PROCEDURE — ? LIQUID NITROGEN

## 2021-07-26 PROCEDURE — ? MEDICATION COUNSELING

## 2021-07-26 PROCEDURE — 99212 OFFICE O/P EST SF 10 MIN: CPT | Mod: 25

## 2021-07-26 PROCEDURE — ? ADDITIONAL NOTES

## 2021-07-26 PROCEDURE — 17110 DESTRUCTION B9 LES UP TO 14: CPT

## 2021-07-26 ASSESSMENT — LOCATION SIMPLE DESCRIPTION DERM: LOCATION SIMPLE: RIGHT FOREHEAD

## 2021-07-26 ASSESSMENT — LOCATION ZONE DERM: LOCATION ZONE: FACE

## 2021-07-26 ASSESSMENT — LOCATION DETAILED DESCRIPTION DERM: LOCATION DETAILED: RIGHT FOREHEAD

## 2021-07-26 NOTE — PROCEDURE: MEDICATION COUNSELING
Albendazole Pregnancy And Lactation Text: This medication is Pregnancy Category C and it isn't known if it is safe during pregnancy. It is also excreted in breast milk.
Infliximab Counseling:  I discussed with the patient the risks of infliximab including but not limited to myelosuppression, immunosuppression, autoimmune hepatitis, demyelinating diseases, lymphoma, and serious infections.  The patient understands that monitoring is required including a PPD at baseline and must alert us or the primary physician if symptoms of infection or other concerning signs are noted.
Tremfya Counseling: I discussed with the patient the risks of guselkumab including but not limited to immunosuppression, serious infections, worsening of inflammatory bowel disease and drug reactions.  The patient understands that monitoring is required including a PPD at baseline and must alert us or the primary physician if symptoms of infection or other concerning signs are noted.
Clindamycin Pregnancy And Lactation Text: This medication can be used in pregnancy if certain situations. Clindamycin is also present in breast milk.
Opioid Pregnancy And Lactation Text: These medications can lead to premature delivery and should be avoided during pregnancy. These medications are also present in breast milk in small amounts.
Hydroxychloroquine Counseling:  I discussed with the patient that a baseline ophthalmologic exam is needed at the start of therapy and every year thereafter while on therapy. A CBC may also be warranted for monitoring.  The side effects of this medication were discussed with the patient, including but not limited to agranulocytosis, aplastic anemia, seizures, rashes, retinopathy, and liver toxicity. Patient instructed to call the office should any adverse effect occur.  The patient verbalized understanding of the proper use and possible adverse effects of Plaquenil.  All the patient's questions and concerns were addressed.
Prednisone Pregnancy And Lactation Text: This medication is Pregnancy Category C and it isn't know if it is safe during pregnancy. This medication is excreted in breast milk.
Spironolactone Counseling: Patient advised regarding risks of diarrhea, abdominal pain, hyperkalemia, birth defects (for female patients), liver toxicity and renal toxicity. The patient may need blood work to monitor liver and kidney function and potassium levels while on therapy. The patient verbalized understanding of the proper use and possible adverse effects of spironolactone.  All of the patient's questions and concerns were addressed.
5-Fu Pregnancy And Lactation Text: This medication is Pregnancy Category X and contraindicated in pregnancy and in women who may become pregnant. It is unknown if this medication is excreted in breast milk.
Fluconazole Counseling:  Patient counseled regarding adverse effects of fluconazole including but not limited to headache, diarrhea, nausea, upset stomach, liver function test abnormalities, taste disturbance, and stomach pain.  There is a rare possibility of liver failure that can occur when taking fluconazole.  The patient understands that monitoring of LFTs and kidney function test may be required, especially at baseline. The patient verbalized understanding of the proper use and possible adverse effects of fluconazole.  All of the patient's questions and concerns were addressed.
Protopic Pregnancy And Lactation Text: This medication is Pregnancy Category C. It is unknown if this medication is excreted in breast milk when applied topically.
Opioid Counseling: I discussed with the patient the potential side effects of opioids including but not limited to addiction, altered mental status, and depression. I stressed avoiding alcohol, benzodiazepines, muscle relaxants and sleep aids unless specifically okayed by a physician. The patient verbalized understanding of the proper use and possible adverse effects of opioids. All of the patient's questions and concerns were addressed. They were instructed to flush the remaining pills down the toilet if they did not need them for pain.
Taltz Pregnancy And Lactation Text: The risk during pregnancy and breastfeeding is uncertain with this medication.
Hydroxychloroquine Pregnancy And Lactation Text: This medication has been shown to cause fetal harm but it isn't assigned a Pregnancy Risk Category. There are small amounts excreted in breast milk.
Ivermectin Counseling:  Patient instructed to take medication on an empty stomach with a full glass of water.  Patient informed of potential adverse effects including but not limited to nausea, diarrhea, dizziness, itching, and swelling of the extremities or lymph nodes.  The patient verbalized understanding of the proper use and possible adverse effects of ivermectin.  All of the patient's questions and concerns were addressed.
Drysol Counseling:  I discussed with the patient the risks of drysol/aluminum chloride including but not limited to skin rash, itching, irritation, burning.
Infliximab Pregnancy And Lactation Text: This medication is Pregnancy Category B and is considered safe during pregnancy. It is unknown if this medication is excreted in breast milk.
Griseofulvin Counseling:  I discussed with the patient the risks of griseofulvin including but not limited to photosensitivity, cytopenia, liver damage, nausea/vomiting and severe allergy.  The patient understands that this medication is best absorbed when taken with a fatty meal (e.g., ice cream or french fries).
Spironolactone Pregnancy And Lactation Text: This medication can cause feminization of the male fetus and should be avoided during pregnancy. The active metabolite is also found in breast milk.
Fluconazole Pregnancy And Lactation Text: This medication is Pregnancy Category C and it isn't know if it is safe during pregnancy. It is also excreted in breast milk.
Doxycycline Counseling:  Patient counseled regarding possible photosensitivity and increased risk for sunburn.  Patient instructed to avoid sunlight, if possible.  When exposed to sunlight, patients should wear protective clothing, sunglasses, and sunscreen.  The patient was instructed to call the office immediately if the following severe adverse effects occur:  hearing changes, easy bruising/bleeding, severe headache, or vision changes.  The patient verbalized understanding of the proper use and possible adverse effects of doxycycline.  All of the patient's questions and concerns were addressed.
Rhofade Counseling: Rhofade is a topical medication which can decrease superficial blood flow where applied. Side effects are uncommon and include stinging, redness and allergic reactions.
Griseofulvin Pregnancy And Lactation Text: This medication is Pregnancy Category X and is known to cause serious birth defects. It is unknown if this medication is excreted in breast milk but breast feeding should be avoided.
Solaraze Counseling:  I discussed with the patient the risks of Solaraze including but not limited to erythema, scaling, itching, weeping, crusting, and pain.
Xeljanz Counseling: I discussed with the patient the risks of Xeljanz therapy including increased risk of infection, liver issues, headache, diarrhea, or cold symptoms. Live vaccines should be avoided. They were instructed to call if they have any problems.
Arava Counseling:  Patient counseled regarding adverse effects of Arava including but not limited to nausea, vomiting, abnormalities in liver function tests. Patients may develop mouth sores, rash, diarrhea, and abnormalities in blood counts. The patient understands that monitoring is required including LFTs and blood counts.  There is a rare possibility of scarring of the liver and lung problems that can occur when taking methotrexate. Persistent nausea, loss of appetite, pale stools, dark urine, cough, and shortness of breath should be reported immediately. Patient advised to discontinue Arava treatment and consult with a physician prior to attempting conception. The patient will have to undergo a treatment to eliminate Arava from the body prior to conception.
Acitretin Counseling:  I discussed with the patient the risks of acitretin including but not limited to hair loss, dry lips/skin/eyes, liver damage, hyperlipidemia, depression/suicidal ideation, photosensitivity.  Serious rare side effects can include but are not limited to pancreatitis, pseudotumor cerebri, bony changes, clot formation/stroke/heart attack.  Patient understands that alcohol is contraindicated since it can result in liver toxicity and significantly prolong the elimination of the drug by many years.
Libtayo Counseling- I discussed with the patient the risks of Libtayo including but not limited to nausea, vomiting, diarrhea, and bone or muscle pain.  The patient verbalized understanding of the proper use and possible adverse effects of Libtayo.  All of the patient's questions and concerns were addressed.
SSKI Counseling:  I discussed with the patient the risks of SSKI including but not limited to thyroid abnormalities, metallic taste, GI upset, fever, headache, acne, arthralgias, paraesthesias, lymphadenopathy, easy bleeding, arrhythmias, and allergic reaction.
Drysol Pregnancy And Lactation Text: This medication is considered safe during pregnancy and breast feeding.
Doxycycline Pregnancy And Lactation Text: This medication is Pregnancy Category D and not consider safe during pregnancy. It is also excreted in breast milk but is considered safe for shorter treatment courses.
Rituxan Counseling:  I discussed with the patient the risks of Rituxan infusions. Side effects can include infusion reactions, severe drug rashes including mucocutaneous reactions, reactivation of latent hepatitis and other infections and rarely progressive multifocal leukoencephalopathy.  All of the patient's questions and concerns were addressed.
Rhofade Pregnancy And Lactation Text: This medication has not been assigned a Pregnancy Risk Category. It is unknown if the medication is excreted in breast milk.
Valtrex Counseling: I discussed with the patient the risks of valacyclovir including but not limited to kidney damage, nausea, vomiting and severe allergy.  The patient understands that if the infection seems to be worsening or is not improving, they are to call.
Xeltimboz Pregnancy And Lactation Text: This medication is Pregnancy Category D and is not considered safe during pregnancy.  The risk during breast feeding is also uncertain.
Arava Pregnancy And Lactation Text: This medication is Pregnancy Category X and is absolutely contraindicated during pregnancy. It is unknown if it is excreted in breast milk.
Itraconazole Counseling:  I discussed with the patient the risks of itraconazole including but not limited to liver damage, nausea/vomiting, neuropathy, and severe allergy.  The patient understands that this medication is best absorbed when taken with acidic beverages such as non-diet cola or ginger ale.  The patient understands that monitoring is required including baseline LFTs and repeat LFTs at intervals.  The patient understands that they are to contact us or the primary physician if concerning signs are noted.
Erythromycin Pregnancy And Lactation Text: This medication is Pregnancy Category B and is considered safe during pregnancy. It is also excreted in breast milk.
Solaraze Pregnancy And Lactation Text: This medication is Pregnancy Category B and is considered safe. There is some data to suggest avoiding during the third trimester. It is unknown if this medication is excreted in breast milk.
Libtayo Pregnancy And Lactation Text: This medication is contraindicated in pregnancy and when breast feeding.
Erythromycin Counseling:  I discussed with the patient the risks of erythromycin including but not limited to GI upset, allergic reaction, drug rash, diarrhea, increase in liver enzymes, and yeast infections.
Acitretin Pregnancy And Lactation Text: This medication is Pregnancy Category X and should not be given to women who are pregnant or may become pregnant in the future. This medication is excreted in breast milk.
Elidel Counseling: Patient may experience a mild burning sensation during topical application. Elidel is not approved in children less than 2 years of age. There have been case reports of hematologic and skin malignancies in patients using topical calcineurin inhibitors although causality is questionable.
Sski Pregnancy And Lactation Text: This medication is Pregnancy Category D and isn't considered safe during pregnancy. It is excreted in breast milk.
Eucrisa Counseling: Patient may experience a mild burning sensation during topical application. Eucrisa is not approved in children less than 2 years of age.
Valtrex Pregnancy And Lactation Text: this medication is Pregnancy Category B and is considered safe during pregnancy. This medication is not directly found in breast milk but it's metabolite acyclovir is present.
Detail Level: Simple
Topical Retinoid counseling:  Patient advised to apply a pea-sized amount only at bedtime and wait 30 minutes after washing their face before applying.  If too drying, patient may add a non-comedogenic moisturizer. The patient verbalized understanding of the proper use and possible adverse effects of retinoids.  All of the patient's questions and concerns were addressed.
Clofazimine Counseling:  I discussed with the patient the risks of clofazimine including but not limited to skin and eye pigmentation, liver damage, nausea/vomiting, gastrointestinal bleeding and allergy.
Xolair Counseling:  Patient informed of potential adverse effects including but not limited to fever, muscle aches, rash and allergic reactions.  The patient verbalized understanding of the proper use and possible adverse effects of Xolair.  All of the patient's questions and concerns were addressed.
Thalidomide Counseling: I discussed with the patient the risks of thalidomide including but not limited to birth defects, anxiety, weakness, chest pain, dizziness, cough and severe allergy.
Bexarotene Counseling:  I discussed with the patient the risks of bexarotene including but not limited to hair loss, dry lips/skin/eyes, liver abnormalities, hyperlipidemia, pancreatitis, depression/suicidal ideation, photosensitivity, drug rash/allergic reactions, hypothyroidism, anemia, leukopenia, infection, cataracts, and teratogenicity.  Patient understands that they will need regular blood tests to check lipid profile, liver function tests, white blood cell count, thyroid function tests and pregnancy test if applicable.
Niacinamide Counseling: I recommended taking niacin or niacinamide, also know as vitamin B3, twice daily. Recent evidence suggests that taking vitamin B3 (500 mg twice daily) can reduce the risk of actinic keratoses and non-melanoma skin cancers. Side effects of vitamin B3 include flushing and headache.
Elidel Pregnancy And Lactation Text: This medication is Pregnancy Category C. It is unknown if this medication is excreted in breast milk.
Nsaids Counseling: NSAID Counseling: I discussed with the patient that NSAIDs should be taken with food. Prolonged use of NSAIDs can result in the development of stomach ulcers.  Patient advised to stop taking NSAIDs if abdominal pain occurs.  The patient verbalized understanding of the proper use and possible adverse effects of NSAIDs.  All of the patient's questions and concerns were addressed.
Ketoconazole Counseling:   Patient counseled regarding improving absorption with orange juice.  Adverse effects include but are not limited to breast enlargement, headache, diarrhea, nausea, upset stomach, liver function test abnormalities, taste disturbance, and stomach pain.  There is a rare possibility of liver failure that can occur when taking ketoconazole. The patient understands that monitoring of LFTs may be required, especially at baseline. The patient verbalized understanding of the proper use and possible adverse effects of ketoconazole.  All of the patient's questions and concerns were addressed.
Isotretinoin Counseling: Patient should get monthly blood tests, not donate blood, not drive at night if vision affected, not share medication, and not undergo elective surgery for 6 months after tx completed. Side effects reviewed, pt to contact office should one occur.
Metronidazole Pregnancy And Lactation Text: This medication is Pregnancy Category B and considered safe during pregnancy.  It is also excreted in breast milk.
Eucrisa Pregnancy And Lactation Text: This medication has not been assigned a Pregnancy Risk Category but animal studies failed to show danger with the topical medication. It is unknown if the medication is excreted in breast milk.
Xolair Pregnancy And Lactation Text: This medication is Pregnancy Category B and is considered safe during pregnancy. This medication is excreted in breast milk.
Metronidazole Counseling:  I discussed with the patient the risks of metronidazole including but not limited to seizures, nausea/vomiting, a metallic taste in the mouth, nausea/vomiting and severe allergy.
Clofazimine Pregnancy And Lactation Text: This medication is Pregnancy Category C and isn't considered safe during pregnancy. It is excreted in breast milk.
Niacinamide Pregnancy And Lactation Text: These medications are considered safe during pregnancy.
Bexarotene Pregnancy And Lactation Text: This medication is Pregnancy Category X and should not be given to women who are pregnant or may become pregnant. This medication should not be used if you are breast feeding.
Use Enhanced Medication Counseling?: No
Minocycline Counseling: Patient advised regarding possible photosensitivity and discoloration of the teeth, skin, lips, tongue and gums.  Patient instructed to avoid sunlight, if possible.  When exposed to sunlight, patients should wear protective clothing, sunglasses, and sunscreen.  The patient was instructed to call the office immediately if the following severe adverse effects occur:  hearing changes, easy bruising/bleeding, severe headache, or vision changes.  The patient verbalized understanding of the proper use and possible adverse effects of minocycline.  All of the patient's questions and concerns were addressed.
Tranexamic Acid Pregnancy And Lactation Text: It is unknown if this medication is safe during pregnancy or breast feeding.
Isotretinoin Pregnancy And Lactation Text: This medication is Pregnancy Category X and is considered extremely dangerous during pregnancy. It is unknown if it is excreted in breast milk.
Nsaids Pregnancy And Lactation Text: These medications are considered safe up to 30 weeks gestation. It is excreted in breast milk.
Tazorac Counseling:  Patient advised that medication is irritating and drying.  Patient may need to apply sparingly and wash off after an hour before eventually leaving it on overnight.  The patient verbalized understanding of the proper use and possible adverse effects of tazorac.  All of the patient's questions and concerns were addressed.
Cimzia Pregnancy And Lactation Text: This medication crosses the placenta but can be considered safe in certain situations. Cimzia may be excreted in breast milk.
Colchicine Counseling:  Patient counseled regarding adverse effects including but not limited to stomach upset (nausea, vomiting, stomach pain, or diarrhea).  Patient instructed to limit alcohol consumption while taking this medication.  Colchicine may reduce blood counts especially with prolonged use.  The patient understands that monitoring of kidney function and blood counts may be required, especially at baseline. The patient verbalized understanding of the proper use and possible adverse effects of colchicine.  All of the patient's questions and concerns were addressed.
Cimzia Counseling:  I discussed with the patient the risks of Cimzia including but not limited to immunosuppression, allergic reactions and infections.  The patient understands that monitoring is required including a PPD at baseline and must alert us or the primary physician if symptoms of infection or other concerning signs are noted.
Hydroquinone Counseling:  Patient advised that medication may result in skin irritation, lightening (hypopigmentation), dryness, and burning.  In the event of skin irritation, the patient was advised to reduce the amount of the drug applied or use it less frequently.  Rarely, spots that are treated with hydroquinone can become darker (pseudoochronosis).  Should this occur, patient instructed to stop medication and call the office. The patient verbalized understanding of the proper use and possible adverse effects of hydroquinone.  All of the patient's questions and concerns were addressed.
Ketoconazole Pregnancy And Lactation Text: This medication is Pregnancy Category C and it isn't know if it is safe during pregnancy. It is also excreted in breast milk and breast feeding isn't recommended.
Tranexamic Acid Counseling:  Patient advised of the small risk of bleeding problems with tranexamic acid. They were also instructed to call if they developed any nausea, vomiting or diarrhea. All of the patient's questions and concerns were addressed.
Cosentyx Counseling:  I discussed with the patient the risks of Cosentyx including but not limited to worsening of Crohn's disease, immunosuppression, allergic reactions and infections.  The patient understands that monitoring is required including a PPD at baseline and must alert us or the primary physician if symptoms of infection or other concerning signs are noted.
Odomzo Counseling- I discussed with the patient the risks of Odomzo including but not limited to nausea, vomiting, diarrhea, constipation, weight loss, changes in the sense of taste, decreased appetite, muscle spasms, and hair loss.  The patient verbalized understanding of the proper use and possible adverse effects of Odomzo.  All of the patient's questions and concerns were addressed.
Azathioprine Pregnancy And Lactation Text: This medication is Pregnancy Category D and isn't considered safe during pregnancy. It is unknown if this medication is excreted in breast milk.
Dapsone Counseling: I discussed with the patient the risks of dapsone including but not limited to hemolytic anemia, agranulocytosis, rashes, methemoglobinemia, kidney failure, peripheral neuropathy, headaches, GI upset, and liver toxicity.  Patients who start dapsone require monitoring including baseline LFTs and weekly CBCs for the first month, then every month thereafter.  The patient verbalized understanding of the proper use and possible adverse effects of dapsone.  All of the patient's questions and concerns were addressed.
Minocycline Pregnancy And Lactation Text: This medication is Pregnancy Category D and not consider safe during pregnancy. It is also excreted in breast milk.
High Dose Vitamin A Counseling: Side effects reviewed, pt to contact office should one occur.
Terbinafine Counseling: Patient counseling regarding adverse effects of terbinafine including but not limited to headache, diarrhea, rash, upset stomach, liver function test abnormalities, itching, taste/smell disturbance, nausea, abdominal pain, and flatulence.  There is a rare possibility of liver failure that can occur when taking terbinafine.  The patient understands that a baseline LFT and kidney function test may be required. The patient verbalized understanding of the proper use and possible adverse effects of terbinafine.  All of the patient's questions and concerns were addressed.
Rituxan Pregnancy And Lactation Text: This medication is Pregnancy Category C and it isn't know if it is safe during pregnancy. It is unknown if this medication is excreted in breast milk but similar antibodies are known to be excreted.
Tazorac Pregnancy And Lactation Text: This medication is not safe during pregnancy. It is unknown if this medication is excreted in breast milk.
Azathioprine Counseling:  I discussed with the patient the risks of azathioprine including but not limited to myelosuppression, immunosuppression, hepatotoxicity, lymphoma, and infections.  The patient understands that monitoring is required including baseline LFTs, Creatinine, possible TPMP genotyping and weekly CBCs for the first month and then every 2 weeks thereafter.  The patient verbalized understanding of the proper use and possible adverse effects of azathioprine.  All of the patient's questions and concerns were addressed.
Cellcept Counseling:  I discussed with the patient the risks of mycophenolate mofetil including but not limited to infection/immunosuppression, GI upset, hypokalemia, hypercholesterolemia, bone marrow suppression, lymphoproliferative disorders, malignancy, GI ulceration/bleed/perforation, colitis, interstitial lung disease, kidney failure, progressive multifocal leukoencephalopathy, and birth defects.  The patient understands that monitoring is required including a baseline creatinine and regular CBC testing. In addition, patient must alert us immediately if symptoms of infection or other concerning signs are noted.
Imiquimod Counseling:  I discussed with the patient the risks of imiquimod including but not limited to erythema, scaling, itching, weeping, crusting, and pain.  Patient understands that the inflammatory response to imiquimod is variable from person to person and was educated regarded proper titration schedule.  If flu-like symptoms develop, patient knows to discontinue the medication and contact us.
Dapsone Pregnancy And Lactation Text: This medication is Pregnancy Category C and is not considered safe during pregnancy or breast feeding.
Siliq Counseling:  I discussed with the patient the risks of Siliq including but not limited to new or worsening depression, suicidal thoughts and behavior, immunosuppression, malignancy, posterior leukoencephalopathy syndrome, and serious infections.  The patient understands that monitoring is required including a PPD at baseline and must alert us or the primary physician if symptoms of infection or other concerning signs are noted. There is also a special program designed to monitor depression which is required with Siliq.
Terbinafine Pregnancy And Lactation Text: This medication is Pregnancy Category B and is considered safe during pregnancy. It is also excreted in breast milk and breast feeding isn't recommended.
High Dose Vitamin A Pregnancy And Lactation Text: High dose vitamin A therapy is contraindicated during pregnancy and breast feeding.
Quinolones Counseling:  I discussed with the patient the risks of fluoroquinolones including but not limited to GI upset, allergic reaction, drug rash, diarrhea, dizziness, photosensitivity, yeast infections, liver function test abnormalities, tendonitis/tendon rupture.
Topical Clindamycin Counseling: Patient counseled that this medication may cause skin irritation or allergic reactions.  In the event of skin irritation, the patient was advised to reduce the amount of the drug applied or use it less frequently.   The patient verbalized understanding of the proper use and possible adverse effects of clindamycin.  All of the patient's questions and concerns were addressed.
Cimetidine Counseling:  I discussed with the patient the risks of Cimetidine including but not limited to gynecomastia, headache, diarrhea, nausea, drowsiness, arrhythmias, pancreatitis, skin rashes, psychosis, bone marrow suppression and kidney toxicity.
Simponi Counseling:  I discussed with the patient the risks of golimumab including but not limited to myelosuppression, immunosuppression, autoimmune hepatitis, demyelinating diseases, lymphoma, and serious infections.  The patient understands that monitoring is required including a PPD at baseline and must alert us or the primary physician if symptoms of infection or other concerning signs are noted.
Topical Sulfur Applications Counseling: Topical Sulfur Counseling: Patient counseled that this medication may cause skin irritation or allergic reactions.  In the event of skin irritation, the patient was advised to reduce the amount of the drug applied or use it less frequently.   The patient verbalized understanding of the proper use and possible adverse effects of topical sulfur application.  All of the patient's questions and concerns were addressed.
Erivedge Counseling- I discussed with the patient the risks of Erivedge including but not limited to nausea, vomiting, diarrhea, constipation, weight loss, changes in the sense of taste, decreased appetite, muscle spasms, and hair loss.  The patient verbalized understanding of the proper use and possible adverse effects of Erivedge.  All of the patient's questions and concerns were addressed.
Otezla Counseling: The side effects of Otezla were discussed with the patient, including but not limited to worsening or new depression, weight loss, diarrhea, nausea, upper respiratory tract infection, and headache. Patient instructed to call the office should any adverse effect occur.  The patient verbalized understanding of the proper use and possible adverse effects of Otezla.  All the patient's questions and concerns were addressed.
Dupixent Counseling: I discussed with the patient the risks of dupilumab including but not limited to eye infection and irritation, cold sores, injection site reactions, worsening of asthma, allergic reactions and increased risk of parasitic infection.  Live vaccines should be avoided while taking dupilumab. Dupilumab will also interact with certain medications such as warfarin and cyclosporine. The patient understands that monitoring is required and they must alert us or the primary physician if symptoms of infection or other concerning signs are noted.
Topical Clindamycin Pregnancy And Lactation Text: This medication is Pregnancy Category B and is considered safe during pregnancy. It is unknown if it is excreted in breast milk.
Cyclophosphamide Counseling:  I discussed with the patient the risks of cyclophosphamide including but not limited to hair loss, hormonal abnormalities, decreased fertility, abdominal pain, diarrhea, nausea and vomiting, bone marrow suppression and infection. The patient understands that monitoring is required while taking this medication.
Dupixent Pregnancy And Lactation Text: This medication likely crosses the placenta but the risk for the fetus is uncertain. This medication is excreted in breast milk.
Benzoyl Peroxide Counseling: Patient counseled that medicine may cause skin irritation and bleach clothing.  In the event of skin irritation, the patient was advised to reduce the amount of the drug applied or use it less frequently.   The patient verbalized understanding of the proper use and possible adverse effects of benzoyl peroxide.  All of the patient's questions and concerns were addressed.
Rifampin Pregnancy And Lactation Text: This medication is Pregnancy Category C and it isn't know if it is safe during pregnancy. It is also excreted in breast milk and should not be used if you are breast feeding.
Topical Sulfur Applications Pregnancy And Lactation Text: This medication is Pregnancy Category C and has an unknown safety profile during pregnancy. It is unknown if this topical medication is excreted in breast milk.
Otezla Pregnancy And Lactation Text: This medication is Pregnancy Category C and it isn't known if it is safe during pregnancy. It is unknown if it is excreted in breast milk.
Rifampin Counseling: I discussed with the patient the risks of rifampin including but not limited to liver damage, kidney damage, red-orange body fluids, nausea/vomiting and severe allergy.
Azithromycin Counseling:  I discussed with the patient the risks of azithromycin including but not limited to GI upset, allergic reaction, drug rash, diarrhea, and yeast infections.
Minoxidil Counseling: Minoxidil is a topical medication which can increase blood flow where it is applied. It is uncertain how this medication increases hair growth. Side effects are uncommon and include stinging and allergic reactions.
Mirvaso Counseling: Mirvaso is a topical medication which can decrease superficial blood flow where applied. Side effects are uncommon and include stinging, redness and allergic reactions.
Skyrizi Counseling: I discussed with the patient the risks of risankizumab-rzaa including but not limited to immunosuppression, and serious infections.  The patient understands that monitoring is required including a PPD at baseline and must alert us or the primary physician if symptoms of infection or other concerning signs are noted.
Bactrim Counseling:  I discussed with the patient the risks of sulfa antibiotics including but not limited to GI upset, allergic reaction, drug rash, diarrhea, dizziness, photosensitivity, and yeast infections.  Rarely, more serious reactions can occur including but not limited to aplastic anemia, agranulocytosis, methemoglobinemia, blood dyscrasias, liver or kidney failure, lung infiltrates or desquamative/blistering drug rashes.
Finasteride Male Counseling: Finasteride Counseling:  I discussed with the patient the risks of use of finasteride including but not limited to decreased libido, decreased ejaculate volume, gynecomastia, and depression. Women should not handle medication.  All of the patient's questions and concerns were addressed.
Wartpeel Counseling:  I discussed with the patient the risks of Wartpeel including but not limited to erythema, scaling, itching, weeping, crusting, and pain.
Cyclophosphamide Pregnancy And Lactation Text: This medication is Pregnancy Category D and it isn't considered safe during pregnancy. This medication is excreted in breast milk.
Doxepin Counseling:  Patient advised that the medication is sedating and not to drive a car after taking this medication. Patient informed of potential adverse effects including but not limited to dry mouth, urinary retention, and blurry vision.  The patient verbalized understanding of the proper use and possible adverse effects of doxepin.  All of the patient's questions and concerns were addressed.
Oxybutynin Counseling:  I discussed with the patient the risks of oxybutynin including but not limited to skin rash, drowsiness, dry mouth, difficulty urinating, and blurred vision.
Azithromycin Pregnancy And Lactation Text: This medication is considered safe during pregnancy and is also secreted in breast milk.
Benzoyl Peroxide Pregnancy And Lactation Text: This medication is Pregnancy Category C. It is unknown if benzoyl peroxide is excreted in breast milk.
Enbrel Counseling:  I discussed with the patient the risks of etanercept including but not limited to myelosuppression, immunosuppression, autoimmune hepatitis, demyelinating diseases, lymphoma, and infections.  The patient understands that monitoring is required including a PPD at baseline and must alert us or the primary physician if symptoms of infection or other concerning signs are noted.
Doxepin Pregnancy And Lactation Text: This medication is Pregnancy Category C and it isn't known if it is safe during pregnancy. It is also excreted in breast milk and breast feeding isn't recommended.
Propranolol Counseling:  I discussed with the patient the risks of propranolol including but not limited to low heart rate, low blood pressure, low blood sugar, restlessness and increased cold sensitivity. They should call the office if they experience any of these side effects.
Finasteride Pregnancy And Lactation Text: This medication is absolutely contraindicated during pregnancy. It is unknown if it is excreted in breast milk.
Sarecycline Counseling: Patient advised regarding possible photosensitivity and discoloration of the teeth, skin, lips, tongue and gums.  Patient instructed to avoid sunlight, if possible.  When exposed to sunlight, patients should wear protective clothing, sunglasses, and sunscreen.  The patient was instructed to call the office immediately if the following severe adverse effects occur:  hearing changes, easy bruising/bleeding, severe headache, or vision changes.  The patient verbalized understanding of the proper use and possible adverse effects of sarecycline.  All of the patient's questions and concerns were addressed.
Cyclosporine Counseling:  I discussed with the patient the risks of cyclosporine including but not limited to hypertension, gingival hyperplasia,myelosuppression, immunosuppression, liver damage, kidney damage, neurotoxicity, lymphoma, and serious infections. The patient understands that monitoring is required including baseline blood pressure, CBC, CMP, lipid panel and uric acid, and then 1-2 times monthly CMP and blood pressure.
Carac Counseling:  I discussed with the patient the risks of Carac including but not limited to erythema, scaling, itching, weeping, crusting, and pain.
Tetracycline Counseling: Patient counseled regarding possible photosensitivity and increased risk for sunburn.  Patient instructed to avoid sunlight, if possible.  When exposed to sunlight, patients should wear protective clothing, sunglasses, and sunscreen.  The patient was instructed to call the office immediately if the following severe adverse effects occur:  hearing changes, easy bruising/bleeding, severe headache, or vision changes.  The patient verbalized understanding of the proper use and possible adverse effects of tetracycline.  All of the patient's questions and concerns were addressed. Patient understands to avoid pregnancy while on therapy due to potential birth defects.
Calcipotriene Counseling:  I discussed with the patient the risks of calcipotriene including but not limited to erythema, scaling, itching, and irritation.
Zyclara Counseling:  I discussed with the patient the risks of imiquimod including but not limited to erythema, scaling, itching, weeping, crusting, and pain.  Patient understands that the inflammatory response to imiquimod is variable from person to person and was educated regarded proper titration schedule.  If flu-like symptoms develop, patient knows to discontinue the medication and contact us.
Picato Counseling:  I discussed with the patient the risks of Picato including but not limited to erythema, scaling, itching, weeping, crusting, and pain.
Stelara Counseling:  I discussed with the patient the risks of ustekinumab including but not limited to immunosuppression, malignancy, posterior leukoencephalopathy syndrome, and serious infections.  The patient understands that monitoring is required including a PPD at baseline and must alert us or the primary physician if symptoms of infection or other concerning signs are noted.
Bactrim Pregnancy And Lactation Text: This medication is Pregnancy Category D and is known to cause fetal risk.  It is also excreted in breast milk.
Gabapentin Counseling: I discussed with the patient the risks of gabapentin including but not limited to dizziness, somnolence, fatigue and ataxia.
Hydroxyzine Counseling: Patient advised that the medication is sedating and not to drive a car after taking this medication.  Patient informed of potential adverse effects including but not limited to dry mouth, urinary retention, and blurry vision.  The patient verbalized understanding of the proper use and possible adverse effects of hydroxyzine.  All of the patient's questions and concerns were addressed.
Humira Counseling:  I discussed with the patient the risks of adalimumab including but not limited to myelosuppression, immunosuppression, autoimmune hepatitis, demyelinating diseases, lymphoma, and serious infections.  The patient understands that monitoring is required including a PPD at baseline and must alert us or the primary physician if symptoms of infection or other concerning signs are noted.
Ilumya Counseling: I discussed with the patient the risks of tildrakizumab including but not limited to immunosuppression, malignancy, posterior leukoencephalopathy syndrome, and serious infections.  The patient understands that monitoring is required including a PPD at baseline and must alert us or the primary physician if symptoms of infection or other concerning signs are noted.
Methotrexate Pregnancy And Lactation Text: This medication is Pregnancy Category X and is known to cause fetal harm. This medication is excreted in breast milk.
Glycopyrrolate Counseling:  I discussed with the patient the risks of glycopyrrolate including but not limited to skin rash, drowsiness, dry mouth, difficulty urinating, and blurred vision.
Cephalexin Pregnancy And Lactation Text: This medication is Pregnancy Category B and considered safe during pregnancy.  It is also excreted in breast milk but can be used safely for shorter doses.
Calcipotriene Pregnancy And Lactation Text: This medication has not been proven safe during pregnancy. It is unknown if this medication is excreted in breast milk.
Hydroxyzine Pregnancy And Lactation Text: This medication is not safe during pregnancy and should not be taken. It is also excreted in breast milk and breast feeding isn't recommended.
Cephalexin Counseling: I counseled the patient regarding use of cephalexin as an antibiotic for prophylactic and/or therapeutic purposes. Cephalexin (commonly prescribed under brand name Keflex) is a cephalosporin antibiotic which is active against numerous classes of bacteria, including most skin bacteria. Side effects may include nausea, diarrhea, gastrointestinal upset, rash, hives, yeast infections, and in rare cases, hepatitis, kidney disease, seizures, fever, confusion, neurologic symptoms, and others. Patients with severe allergies to penicillin medications are cautioned that there is about a 10% incidence of cross-reactivity with cephalosporins. When possible, patients with penicillin allergies should use alternatives to cephalosporins for antibiotic therapy.
Methotrexate Counseling:  Patient counseled regarding adverse effects of methotrexate including but not limited to nausea, vomiting, abnormalities in liver function tests. Patients may develop mouth sores, rash, diarrhea, and abnormalities in blood counts. The patient understands that monitoring is required including LFT's and blood counts.  There is a rare possibility of scarring of the liver and lung problems that can occur when taking methotrexate. Persistent nausea, loss of appetite, pale stools, dark urine, cough, and shortness of breath should be reported immediately. Patient advised to discontinue methotrexate treatment at least three months before attempting to become pregnant.  I discussed the need for folate supplements while taking methotrexate.  These supplements can decrease side effects during methotrexate treatment. The patient verbalized understanding of the proper use and possible adverse effects of methotrexate.  All of the patient's questions and concerns were addressed.
Propranolol Pregnancy And Lactation Text: This medication is Pregnancy Category C and it isn't known if it is safe during pregnancy. It is excreted in breast milk.
Clindamycin Counseling: I counseled the patient regarding use of clindamycin as an antibiotic for prophylactic and/or therapeutic purposes. Clindamycin is active against numerous classes of bacteria, including skin bacteria. Side effects may include nausea, diarrhea, gastrointestinal upset, rash, hives, yeast infections, and in rare cases, colitis.
Prednisone Counseling:  I discussed with the patient the risks of prolonged use of prednisone including but not limited to weight gain, insomnia, osteoporosis, mood changes, diabetes, susceptibility to infection, glaucoma and high blood pressure.  In cases where prednisone use is prolonged, patients should be monitored with blood pressure checks, serum glucose levels and an eye exam.  Additionally, the patient may need to be placed on GI prophylaxis, PCP prophylaxis, and calcium and vitamin D supplementation and/or a bisphosphonate.  The patient verbalized understanding of the proper use and the possible adverse effects of prednisone.  All of the patient's questions and concerns were addressed.
Birth Control Pills Pregnancy And Lactation Text: This medication should be avoided if pregnant and for the first 30 days post-partum.
Glycopyrrolate Pregnancy And Lactation Text: This medication is Pregnancy Category B and is considered safe during pregnancy. It is unknown if it is excreted breast milk.
Protopic Counseling: Patient may experience a mild burning sensation during topical application. Protopic is not approved in children less than 2 years of age. There have been case reports of hematologic and skin malignancies in patients using topical calcineurin inhibitors although causality is questionable.
Albendazole Counseling:  I discussed with the patient the risks of albendazole including but not limited to cytopenia, kidney damage, nausea/vomiting and severe allergy.  The patient understands that this medication is being used in an off-label manner.
Taltz Counseling: I discussed with the patient the risks of ixekizumab including but not limited to immunosuppression, serious infections, worsening of inflammatory bowel disease and drug reactions.  The patient understands that monitoring is required including a PPD at baseline and must alert us or the primary physician if symptoms of infection or other concerning signs are noted.
5-Fu Counseling: 5-Fluorouracil Counseling:  I discussed with the patient the risks of 5-fluorouracil including but not limited to erythema, scaling, itching, weeping, crusting, and pain.
Birth Control Pills Counseling: Birth Control Pill Counseling: I discussed with the patient the potential side effects of OCPs including but not limited to increased risk of stroke, heart attack, thrombophlebitis, deep venous thrombosis, hepatic adenomas, breast changes, GI upset, headaches, and depression.  The patient verbalized understanding of the proper use and possible adverse effects of OCPs. All of the patient's questions and concerns were addressed.

## 2021-07-26 NOTE — PROCEDURE: MIPS QUALITY
Quality 402: Tobacco Use And Help With Quitting Among Adolescents: Patient screened for tobacco and never smoked
Quality 226: Preventive Care And Screening: Tobacco Use: Screening And Cessation Intervention: Patient screened for tobacco use and is an ex/non-smoker
Detail Level: Detailed
Quality 111:Pneumonia Vaccination Status For Older Adults: Pneumococcal Vaccination Previously Received
Quality 130: Documentation Of Current Medications In The Medical Record: Current Medications Documented
Quality 431: Preventive Care And Screening: Unhealthy Alcohol Use - Screening: Patient screened for unhealthy alcohol use using a single question and scores less than 2 times per year

## 2021-07-26 NOTE — PROCEDURE: LIQUID NITROGEN
Post-Care Instructions: I reviewed with the patient in detail post-care instructions. Patient is to wear sunprotection, and avoid picking at any of the treated lesions. Pt may apply Vaseline to crusted or scabbing areas.
Medical Necessity Clause: This procedure was medically necessary because the lesions that were treated were:
Consent: The patient's consent was obtained including but not limited to risks of crusting, scabbing, blistering, scarring, darker or lighter pigmentary change, recurrence, incomplete removal and infection.
Medical Necessity Information: It is in your best interest to select a reason for this procedure from the list below. All of these items fulfill various CMS LCD requirements except the new and changing color options.
Add 52 Modifier (Optional): no
Show Applicator Variable?: Yes
Detail Level: Detailed

## 2021-07-26 NOTE — PROCEDURE: ADDITIONAL NOTES
Render Risk Assessment In Note?: no
Detail Level: Simple
Additional Notes: used 5 fu with good response however noted papule today, may be ISK, treated LN2 will f/u 3 mths

## 2021-08-12 ENCOUNTER — HOSPITAL ENCOUNTER (OUTPATIENT)
Facility: MEDICAL CENTER | Age: 86
End: 2021-08-12
Attending: FAMILY MEDICINE
Payer: MEDICARE

## 2021-08-12 ENCOUNTER — NON-PROVIDER VISIT (OUTPATIENT)
Dept: INTERNAL MEDICINE | Facility: IMAGING CENTER | Age: 86
End: 2021-08-12
Payer: MEDICARE

## 2021-08-12 DIAGNOSIS — J01.90 ACUTE NON-RECURRENT SINUSITIS, UNSPECIFIED LOCATION: ICD-10-CM

## 2021-08-12 DIAGNOSIS — R09.81 SINUS CONGESTION: ICD-10-CM

## 2021-08-12 PROCEDURE — U0003 INFECTIOUS AGENT DETECTION BY NUCLEIC ACID (DNA OR RNA); SEVERE ACUTE RESPIRATORY SYNDROME CORONAVIRUS 2 (SARS-COV-2) (CORONAVIRUS DISEASE [COVID-19]), AMPLIFIED PROBE TECHNIQUE, MAKING USE OF HIGH THROUGHPUT TECHNOLOGIES AS DESCRIBED BY CMS-2020-01-R: HCPCS

## 2021-08-12 PROCEDURE — U0005 INFEC AGEN DETEC AMPLI PROBE: HCPCS

## 2021-08-13 DIAGNOSIS — R09.81 SINUS CONGESTION: ICD-10-CM

## 2021-08-13 LAB
COVID ORDER STATUS COVID19: NORMAL
SARS-COV-2 RNA RESP QL NAA+PROBE: NOTDETECTED
SPECIMEN SOURCE: NORMAL

## 2021-08-13 RX ORDER — AZITHROMYCIN 250 MG/1
TABLET, FILM COATED ORAL
Qty: 6 TABLET | Refills: 0 | Status: SHIPPED | OUTPATIENT
Start: 2021-08-13 | End: 2021-12-09

## 2021-10-25 ENCOUNTER — APPOINTMENT (RX ONLY)
Dept: URBAN - METROPOLITAN AREA CLINIC 20 | Facility: CLINIC | Age: 86
Setting detail: DERMATOLOGY
End: 2021-10-25

## 2021-10-25 DIAGNOSIS — L82.0 INFLAMED SEBORRHEIC KERATOSIS: ICD-10-CM

## 2021-10-25 PROBLEM — D04.39 CARCINOMA IN SITU OF SKIN OF OTHER PARTS OF FACE: Status: ACTIVE | Noted: 2021-10-25

## 2021-10-25 PROCEDURE — ? LIQUID NITROGEN

## 2021-10-25 PROCEDURE — ? ADDITIONAL NOTES

## 2021-10-25 PROCEDURE — ? COUNSELING

## 2021-10-25 PROCEDURE — 99213 OFFICE O/P EST LOW 20 MIN: CPT | Mod: 25

## 2021-10-25 PROCEDURE — 17110 DESTRUCTION B9 LES UP TO 14: CPT

## 2021-10-25 ASSESSMENT — LOCATION ZONE DERM
LOCATION ZONE: HAND
LOCATION ZONE: FACE

## 2021-10-25 ASSESSMENT — LOCATION DETAILED DESCRIPTION DERM
LOCATION DETAILED: RIGHT SUPERIOR LATERAL BUCCAL CHEEK
LOCATION DETAILED: LEFT DORSAL SMALL METACARPOPHALANGEAL JOINT

## 2021-10-25 ASSESSMENT — LOCATION SIMPLE DESCRIPTION DERM
LOCATION SIMPLE: RIGHT CHEEK
LOCATION SIMPLE: LEFT HAND

## 2021-10-25 NOTE — PROCEDURE: MIPS QUALITY
Quality 130: Documentation Of Current Medications In The Medical Record: Current Medications Documented
Quality 226: Preventive Care And Screening: Tobacco Use: Screening And Cessation Intervention: Patient screened for tobacco use and is an ex/non-smoker
Quality 111:Pneumonia Vaccination Status For Older Adults: Pneumococcal Vaccination Previously Received
Detail Level: Detailed
Quality 431: Preventive Care And Screening: Unhealthy Alcohol Use - Screening: Patient screened for unhealthy alcohol use using a single question and scores less than 2 times per year
Quality 402: Tobacco Use And Help With Quitting Among Adolescents: Patient screened for tobacco and never smoked

## 2021-10-25 NOTE — PROCEDURE: ADDITIONAL NOTES
Render Risk Assessment In Note?: no
Additional Notes: Appears spot was a keratosis resolved with LN2
Detail Level: Simple

## 2021-12-08 ENCOUNTER — OFFICE VISIT (OUTPATIENT)
Dept: INTERNAL MEDICINE | Facility: IMAGING CENTER | Age: 86
End: 2021-12-08
Payer: MEDICARE

## 2021-12-08 VITALS
WEIGHT: 130.07 LBS | OXYGEN SATURATION: 97 % | HEIGHT: 63 IN | BODY MASS INDEX: 23.05 KG/M2 | RESPIRATION RATE: 17 BRPM | DIASTOLIC BLOOD PRESSURE: 62 MMHG | HEART RATE: 69 BPM | SYSTOLIC BLOOD PRESSURE: 130 MMHG | TEMPERATURE: 97.9 F

## 2021-12-08 DIAGNOSIS — T14.8XXA FRICTION INJURY TO SKIN: ICD-10-CM

## 2021-12-08 PROCEDURE — 99213 OFFICE O/P EST LOW 20 MIN: CPT | Performed by: FAMILY MEDICINE

## 2021-12-08 ASSESSMENT — FIBROSIS 4 INDEX: FIB4 SCORE: 2.11

## 2021-12-09 ASSESSMENT — PATIENT HEALTH QUESTIONNAIRE - PHQ9: CLINICAL INTERPRETATION OF PHQ2 SCORE: 0

## 2021-12-09 NOTE — PROGRESS NOTES
"Chief Complaint   Patient presents with   • Bump     Right foot 4th toe. Has been present x3-4 weeks. Denies pain but is slightly annoying.       HPI:  Patient is a 89 y.o. female established patient who presents today for evaluation of new skin spot on right side of right 4th toe that has been present for the past few weeks. Patient reports lesion became painful and tender to touch, and she has been using OTC blister spray with good results. Today lesion is resolving and feels much better overall. Patient is doing well at this time and is happier now that her  has moved from [place] to [facility].    Patient Active Problem List    Diagnosis Date Noted   • PAF (paroxysmal atrial fibrillation) (HCC) 12/15/2020   • Mixed dyslipidemia 12/15/2020   • Elevated hemoglobin A1c 12/15/2020   • History of recurrent UTIs 01/10/2019   • Vitamin D deficiency 09/25/2018   • Systolic murmur 06/29/2017   • Osteoporosis 08/24/2016   • Elevated blood pressure, situational 08/24/2016   • H/O bilateral cataract extraction 08/24/2016       Past medical, surgical, family, and social history was reviewed and updated in Epic chart by me today.     Medications and allergies reviewed and updated in Epic chart by me today.     ROS:  Pertinent positives listed above in HPI. All other systems have been reviewed and are negative.    PE:   /62 (BP Location: Left arm, Patient Position: Sitting, BP Cuff Size: Adult)   Pulse 69   Temp 36.6 °C (97.9 °F) (Temporal)   Resp 17   Ht 1.6 m (5' 3\")   Wt 59 kg (130 lb 1.1 oz)   SpO2 97%   BMI 23.04 kg/m²   Vital signs reviewed with patient.     Gen: Well developed; well nourished; no acute distress; non toxic appearance   Lower extremities: No peripheral edema b/l LE extremities/ no clubbing nor cyanosis noted; resolving skin friction lesion on lateral aspect of right 4th toe - toe spacing between 4th and 5th toe is very small/tight  Skin: Warm and dry; no rashes " noted   Neuro: No focal deficits noted   Psych: AAOx4; mood and affect are appropriate    A/P:  1. Friction injury to skin  Patient has resolving friction skin lesion on lateral aspect of right 4th toe - toe space is very tight and narrow between 4th and 5th toes. No signs of infection and we discussed strategies to reduce skin friction in the future.      Patient reminded to update annual fasting labs and schedule Medicare Annual Wellness visit with me in the near future for ongoing medical management.

## 2021-12-14 DIAGNOSIS — R73.9 HYPERGLYCEMIA: ICD-10-CM

## 2021-12-14 DIAGNOSIS — E55.9 VITAMIN D DEFICIENCY: ICD-10-CM

## 2021-12-14 DIAGNOSIS — E78.2 MIXED DYSLIPIDEMIA: ICD-10-CM

## 2021-12-14 DIAGNOSIS — Z00.00 HEALTH CARE MAINTENANCE: ICD-10-CM

## 2021-12-14 DIAGNOSIS — R53.83 OTHER FATIGUE: ICD-10-CM

## 2021-12-15 ENCOUNTER — NON-PROVIDER VISIT (OUTPATIENT)
Dept: INTERNAL MEDICINE | Facility: IMAGING CENTER | Age: 86
End: 2021-12-15
Payer: MEDICARE

## 2021-12-15 ENCOUNTER — HOSPITAL ENCOUNTER (OUTPATIENT)
Facility: MEDICAL CENTER | Age: 86
End: 2021-12-15
Attending: FAMILY MEDICINE
Payer: MEDICARE

## 2021-12-15 DIAGNOSIS — R73.9 HYPERGLYCEMIA: ICD-10-CM

## 2021-12-15 DIAGNOSIS — Z00.00 HEALTH CARE MAINTENANCE: ICD-10-CM

## 2021-12-15 DIAGNOSIS — E55.9 VITAMIN D DEFICIENCY: ICD-10-CM

## 2021-12-15 DIAGNOSIS — E78.2 MIXED DYSLIPIDEMIA: ICD-10-CM

## 2021-12-15 DIAGNOSIS — R53.83 OTHER FATIGUE: ICD-10-CM

## 2021-12-15 LAB
ALBUMIN SERPL BCP-MCNC: 4.6 G/DL (ref 3.2–4.9)
ALBUMIN/GLOB SERPL: 1.8 G/DL
ALP SERPL-CCNC: 117 U/L (ref 30–99)
ALT SERPL-CCNC: 13 U/L (ref 2–50)
ANION GAP SERPL CALC-SCNC: 11 MMOL/L (ref 7–16)
AST SERPL-CCNC: 17 U/L (ref 12–45)
BASOPHILS # BLD AUTO: 1 % (ref 0–1.8)
BASOPHILS # BLD: 0.05 K/UL (ref 0–0.12)
BILIRUB SERPL-MCNC: 0.5 MG/DL (ref 0.1–1.5)
BUN SERPL-MCNC: 14 MG/DL (ref 8–22)
CALCIUM SERPL-MCNC: 9.3 MG/DL (ref 8.5–10.5)
CHLORIDE SERPL-SCNC: 108 MMOL/L (ref 96–112)
CHOLEST SERPL-MCNC: 329 MG/DL (ref 100–199)
CO2 SERPL-SCNC: 25 MMOL/L (ref 20–33)
CREAT SERPL-MCNC: 0.73 MG/DL (ref 0.5–1.4)
EOSINOPHIL # BLD AUTO: 0.17 K/UL (ref 0–0.51)
EOSINOPHIL NFR BLD: 3.4 % (ref 0–6.9)
ERYTHROCYTE [DISTWIDTH] IN BLOOD BY AUTOMATED COUNT: 44.4 FL (ref 35.9–50)
EST. AVERAGE GLUCOSE BLD GHB EST-MCNC: 120 MG/DL
GLOBULIN SER CALC-MCNC: 2.5 G/DL (ref 1.9–3.5)
GLUCOSE SERPL-MCNC: 108 MG/DL (ref 65–99)
HBA1C MFR BLD: 5.8 % (ref 4–5.6)
HCT VFR BLD AUTO: 46.2 % (ref 37–47)
HDLC SERPL-MCNC: 66 MG/DL
HGB BLD-MCNC: 14.9 G/DL (ref 12–16)
IMM GRANULOCYTES # BLD AUTO: 0.02 K/UL (ref 0–0.11)
IMM GRANULOCYTES NFR BLD AUTO: 0.4 % (ref 0–0.9)
LDLC SERPL CALC-MCNC: 233 MG/DL
LYMPHOCYTES # BLD AUTO: 1.97 K/UL (ref 1–4.8)
LYMPHOCYTES NFR BLD: 39.3 % (ref 22–41)
MCH RBC QN AUTO: 29.8 PG (ref 27–33)
MCHC RBC AUTO-ENTMCNC: 32.3 G/DL (ref 33.6–35)
MCV RBC AUTO: 92.4 FL (ref 81.4–97.8)
MONOCYTES # BLD AUTO: 0.41 K/UL (ref 0–0.85)
MONOCYTES NFR BLD AUTO: 8.2 % (ref 0–13.4)
NEUTROPHILS # BLD AUTO: 2.39 K/UL (ref 2–7.15)
NEUTROPHILS NFR BLD: 47.7 % (ref 44–72)
NRBC # BLD AUTO: 0 K/UL
NRBC BLD-RTO: 0 /100 WBC
PLATELET # BLD AUTO: 191 K/UL (ref 164–446)
PMV BLD AUTO: 10.1 FL (ref 9–12.9)
POTASSIUM SERPL-SCNC: 4.1 MMOL/L (ref 3.6–5.5)
PROT SERPL-MCNC: 7.1 G/DL (ref 6–8.2)
RBC # BLD AUTO: 5 M/UL (ref 4.2–5.4)
SODIUM SERPL-SCNC: 144 MMOL/L (ref 135–145)
TRIGL SERPL-MCNC: 151 MG/DL (ref 0–149)
TSH SERPL DL<=0.005 MIU/L-ACNC: 3.73 UIU/ML (ref 0.38–5.33)
WBC # BLD AUTO: 5 K/UL (ref 4.8–10.8)

## 2021-12-15 PROCEDURE — 82306 VITAMIN D 25 HYDROXY: CPT

## 2021-12-15 PROCEDURE — 80061 LIPID PANEL: CPT

## 2021-12-15 PROCEDURE — 83036 HEMOGLOBIN GLYCOSYLATED A1C: CPT

## 2021-12-15 PROCEDURE — 84443 ASSAY THYROID STIM HORMONE: CPT

## 2021-12-15 PROCEDURE — 85025 COMPLETE CBC W/AUTO DIFF WBC: CPT

## 2021-12-15 PROCEDURE — 80053 COMPREHEN METABOLIC PANEL: CPT

## 2021-12-18 LAB — 25(OH)D3 SERPL-MCNC: 18 NG/ML (ref 30–80)

## 2021-12-22 ENCOUNTER — OFFICE VISIT (OUTPATIENT)
Dept: INTERNAL MEDICINE | Facility: IMAGING CENTER | Age: 86
End: 2021-12-22
Payer: MEDICARE

## 2021-12-22 VITALS
DIASTOLIC BLOOD PRESSURE: 80 MMHG | HEIGHT: 63 IN | SYSTOLIC BLOOD PRESSURE: 148 MMHG | OXYGEN SATURATION: 96 % | HEART RATE: 71 BPM | BODY MASS INDEX: 22.5 KG/M2 | RESPIRATION RATE: 17 BRPM | TEMPERATURE: 98.3 F | WEIGHT: 127 LBS

## 2021-12-22 DIAGNOSIS — M81.0 AGE-RELATED OSTEOPOROSIS WITHOUT CURRENT PATHOLOGICAL FRACTURE: ICD-10-CM

## 2021-12-22 DIAGNOSIS — E78.2 MIXED DYSLIPIDEMIA: ICD-10-CM

## 2021-12-22 DIAGNOSIS — R73.09 ELEVATED HEMOGLOBIN A1C: ICD-10-CM

## 2021-12-22 DIAGNOSIS — E55.9 VITAMIN D DEFICIENCY: ICD-10-CM

## 2021-12-22 DIAGNOSIS — I48.0 PAF (PAROXYSMAL ATRIAL FIBRILLATION) (HCC): ICD-10-CM

## 2021-12-22 DIAGNOSIS — Z00.00 ENCOUNTER FOR MEDICARE ANNUAL WELLNESS EXAM: ICD-10-CM

## 2021-12-22 DIAGNOSIS — I10 ESSENTIAL HYPERTENSION: ICD-10-CM

## 2021-12-22 PROCEDURE — G0439 PPPS, SUBSEQ VISIT: HCPCS | Performed by: FAMILY MEDICINE

## 2021-12-22 RX ORDER — LISINOPRIL 5 MG/1
5 TABLET ORAL DAILY
Qty: 30 TABLET | Refills: 3 | Status: SHIPPED | OUTPATIENT
Start: 2021-12-22 | End: 2022-04-01 | Stop reason: SDUPTHER

## 2021-12-22 RX ORDER — ATORVASTATIN CALCIUM 20 MG/1
20 TABLET, FILM COATED ORAL
Qty: 30 TABLET | Refills: 3 | Status: SHIPPED | OUTPATIENT
Start: 2021-12-22 | End: 2022-04-01 | Stop reason: SDUPTHER

## 2021-12-22 ASSESSMENT — FIBROSIS 4 INDEX: FIB4 SCORE: 2.25

## 2021-12-22 ASSESSMENT — ENCOUNTER SYMPTOMS: GENERAL WELL-BEING: EXCELLENT

## 2021-12-22 ASSESSMENT — ACTIVITIES OF DAILY LIVING (ADL): BATHING_REQUIRES_ASSISTANCE: 0

## 2021-12-22 ASSESSMENT — PATIENT HEALTH QUESTIONNAIRE - PHQ9: CLINICAL INTERPRETATION OF PHQ2 SCORE: 0

## 2021-12-26 NOTE — PROGRESS NOTES
CC:   Medicare Annual Wellness Visit    HPI:  Char is a 89 y.o. female here for her Medicare Annual Wellness Visit and to review labs done 12/15/2021. She feels much better now that her  has settled into living at [facility] (moved him from [place]). She has chronic PAF without current symptoms and declines current cardiology follow-up/ daily medication use. She has history of osteoporosis without current fracture and declines repeat dexa scan/ treatment medications/ supplements. She has chronic HgA1c elevation without DM diagnosis, and chronic mixed dyslipidemia since age 30 (declined statin use historically). She continues to lead an active lifestyle, remains busy with family and friends, lives independently at her home in [facility] [place], and denies new mental health concerns.     Patient Active Problem List    Diagnosis Date Noted   • PAF (paroxysmal atrial fibrillation) (HCC) 12/15/2020   • Mixed dyslipidemia 12/15/2020   • Elevated hemoglobin A1c 12/15/2020   • History of recurrent UTIs 01/10/2019   • Vitamin D deficiency 09/25/2018   • Systolic murmur 06/29/2017   • Osteoporosis 08/24/2016     Current Outpatient Medications   Medication Sig Dispense Refill   • lisinopril (PRINIVIL) 5 MG Tab Take 1 Tablet by mouth every day. 30 Tablet 3   • atorvastatin (LIPITOR) 20 MG Tab Take 1 Tablet by mouth at bedtime. 30 Tablet 3     No current facility-administered medications for this visit.      Current supplements: see MAR  Chronic narcotic pain medicines: no  Allergies: Soy allergy  Exercise: yes  Current social contact/activities: yes  Current mood: good  Advance Directive on file: no (I have encouraged patient to bring us a copy for her [facility] record)    Screening:  Depression Screening    Little interest or pleasure in doing things?  0 - not at all  Feeling down, depressed , or hopeless? 0 - not at all  Patient Health Questionnaire Score: 0    Screening for Cognitive Impairment    Three  Minute Recall (captain, garden, picture) 3/3    Otto clock face with all 12 numbers and set the hands to show 5 past 8.  Yes    Cognitive concerns identified deferred for follow up unless specifically addressed in assessment and plan.    Fall Risk Assessment    Has the patient had two or more falls in the last year or any fall with injury in the last year?  No    Safety Assessment    Throw rugs on floor.  No  Handrails on all stairs.  Yes  Good lighting in all hallways.  Yes  Difficulty hearing.  No  Patient counseled about all safety risks that were identified.    Functional Assessment ADLs    Are there any barriers preventing you from cooking for yourself or meeting nutritional needs?  No.    Are there any barriers preventing you from driving safely or obtaining transportation?  No.    Are there any barriers preventing you from using a telephone or calling for help?  No.    Are there any barriers preventing you from shopping?  No.    Are there any barriers preventing you from taking care of your own finances?  No.    Are there any barriers preventing you from managing your medications?  No.    Are there any barriers preventing you from showering, bathing or dressing yourself?  No.    Are you currently engaging in any exercise or physical activity?  Yes.     What is your perception of your health?  Excellent.      Health Maintenance Summary          BONE DENSITY (Every 5 Years) Next due on 7/12/2022 07/12/2017  DS-BONE DENSITY STUDY (DEXA)          Annual Wellness Visit (Every 366 Days) Next due on 12/27/2022 12/26/2021  Subsequent Annual Wellness Visit - Includes PPPS ()    12/22/2021  Visit Dx: Encounter for Medicare annual wellness exam    12/15/2020  Subsequent Annual Wellness Visit - Includes PPPS ()    12/15/2020  Visit Dx: Encounter for Medicare annual wellness exam    12/12/2019  Done    Only the first 5 history entries have been loaded, but more history exists.          IMM DTaP/Tdap/Td  Vaccine (2 - Td or Tdap) Next due on 3/22/2024    03/22/2014  Imm Admin: Tdap Vaccine    06/19/2007  Imm Admin: TD Vaccine    02/26/1997  Imm Admin: TD Vaccine    07/26/1995  Imm Admin: TD Vaccine          IMM HEP B VACCINE (Series Information) Aged Out    09/10/1997  Imm Admin: Hepatitis B Vaccine (Adol/Adult)    04/09/1997  Imm Admin: Hepatitis B Vaccine (Adol/Adult)    03/05/1997  Imm Admin: Hepatitis B Vaccine (Adol/Adult)          IMM PNEUMOCOCCAL VACCINE: 65+ Years (Series Information) Completed    11/20/2018  Imm Admin: Pneumococcal polysaccharide vaccine (PPSV-23)    03/28/2016  Imm Admin: Pneumococcal Conjugate Vaccine (Prevnar/PCV-13)    10/20/1997  Imm Admin: Pneumococcal polysaccharide vaccine (PPSV-23)          IMM ZOSTER VACCINES (Series Information) Completed    10/16/2019  Imm Admin: Zoster Vaccine Recombinant (RZV) (SHINGRIX)    08/16/2019  Imm Admin: Zoster Vaccine Recombinant (RZV) (SHINGRIX)    09/23/2016  Imm Admin: Zoster Vaccine Live (ZVL) (Zostavax) - HISTORICAL DATA          IMM INFLUENZA (Series Information) Completed    09/14/2021  Imm Admin: Influenza Vaccine Adult HD    09/04/2020  Imm Admin: Influenza Vaccine Adult HD    09/24/2019  Imm Admin: Influenza, Unspecified - HISTORICAL DATA    09/29/2018  Imm Admin: Influenza Vaccine Adult HD    09/30/2017  Imm Admin: Influenza Vaccine Adult HD    Only the first 5 history entries have been loaded, but more history exists.          COVID-19 Vaccine (Series Information) Completed    10/01/2021  Imm Admin: Pfizer SARS-CoV-2 Vaccine 12+    02/11/2021  Imm Admin: Pfizer SARS-CoV-2 Vaccine    01/21/2021  Imm Admin: Pfizer SARS-CoV-2 Vaccine          IMM MENINGOCOCCAL VACCINE (MCV4) (Series Information) Aged Out    No completion history exists for this topic.          Discontinued - COLORECTAL CANCER SCREENING  Discontinued    No completion history exists for this topic.                Patient Care Team:  Gisella Jenkins M.D. as PCP - General  "(Family Medicine)      Social History     Tobacco Use   • Smoking status: Never Smoker   • Smokeless tobacco: Never Used   Substance Use Topics   • Alcohol use: No     Alcohol/week: 0.0 oz   • Drug use: No     Family History   Family history unknown: Yes     She  has a past medical history of Allergy, Arrhythmia (2017), Cataract, History of recurrent UTIs (1/10/2019), and Hyperlipidemia.   Past Surgical History:   Procedure Laterality Date   • HIP NAILING INTRAMEDULLARY Left 5/30/2017    Procedure: HIP NAILING INTRAMEDULLARY;  Surgeon: Zion Pimentel M.D.;  Location: SURGERY [facility];  Service:    • OPEN REDUCTION Left 2016    hip fracture   • MAMMOPLASTY AUGMENTATION  age 30's       ROS:    All positives noted in HPI. All others reviewed and are negative.    Ostomy or other tubes or amputations: no  Chronic oxygen use: no  Last eye exam: UTD per report  : denies urinary incontinence; does not interfere with ADLs/ sleep  Gait: stable   Problems with balance/ difficulty walking: no  Hearing: adequate    Dentition: adequate     Lab results 12/15/2021 reviewed with patient at visit today.     Exam:   /80 (BP Location: Left arm, Patient Position: Sitting, BP Cuff Size: Adult)   Pulse 71   Temp 36.8 °C (98.3 °F) (Temporal)   Resp 17   Ht 1.6 m (5' 3\")   Wt 57.6 kg (127 lb)   SpO2 96%  Body mass index is 22.5 kg/m².    Gen: Well developed; well nourished; no acute distress; age appropriate appearance   HEENT: Normocephalic; atraumatic; PEERLA b/l; sclera clear b/l; b/l external auditory canals WNL; b/l TM WNL; nares patent; oropharynx clear; mask in place   Neck: No adenopathy; no thyromegaly  CV: Regular rate and rhythm; S1/ S2 present; no murmur, gallop or rub noted  Pulm: No respiratory distress; clear to ascultation b/l; no wheezing or stridor noted b/l  Abd: Adequate bowel sounds noted; soft and nontender; no rebound, rigidity, nor distention   Extremities: No peripheral edema b/l LE " extremities/ no clubbing nor cyanosis noted  Skin: Warm and dry; no rashes noted   Neuro: No focal deficits noted; pt is able to get up out of chair unassisted and walk forward  Psych: AAOx4; mood and affect are appropriate    Assessment and Plan:  1. Mixed dyslipidemia  Uncontrolled/ we discussed condition at visit and recommend patient start nightly Lipitor use, make necessary diet adjustments, continue regular exercise, and repeat fasting lipid panel at end of March. New RX sent to pharmacy, and medication potential side effects reviewed reviewed with patient.   - atorvastatin (LIPITOR) 20 MG Tab; Take 1 Tablet by mouth at bedtime.  Dispense: 30 Tablet; Refill: 3  - Lipid Profile; Future  - Subsequent Annual Wellness Visit - Includes PPPS ()    2. Essential hypertension  Uncontrolled/ coupled with significantly elevated lipid panel, I am concerned about patient experiencing a CV related event if we do not manage her blood pressure. Recommend patient start low dose daily Lisinopril and repeat CMP at end of March. New RX sent to pharmacy.   - lisinopril (PRINIVIL) 5 MG Tab; Take 1 Tablet by mouth every day.  Dispense: 30 Tablet; Refill: 3  - Comp Metabolic Panel; Future  - Subsequent Annual Wellness Visit - Includes PPPS ()    3. Elevated hemoglobin A1c  Improved/ HgA1c is now 5.8% (previously 6.1%) and encouragement provided to patient to continue monitoring her sugar/ white carb intake.   - Subsequent Annual Wellness Visit - Includes PPPS ()    4. Age-related osteoporosis without current pathological fracture  Stable/ patient declines medication/ supplements/ repeat dexa scan.  - Subsequent Annual Wellness Visit - Includes PPPS ()    5. PAF (paroxysmal atrial fibrillation) (HCC)  Stable/ patient is in NSR today and she has historically declined daily medication use (inlcuding ASA and blood thinners)/ Cardiology follow-up.   - Subsequent Annual Wellness Visit - Includes PPPS ()    6.  Vitamin D deficiency  Uncontrolled/ benefits of normalizing Vitamin D level discussed with patient at visit today and due to soy allergy, recommend patient go to [employer] or [employer] to purchase a vegan daily Vitamin D supplement. Will repeat Vitamin D level at end of March for ongoing management.   - VITAMIN D,25 HYDROXY; Future  - Subsequent Annual Wellness Visit - Includes PPPS ()    7. Encounter for Medicare annual wellness exam  Patient remains independent and is able to continue to care for all of her needs. She has a very supportive family and remains well informed about medical conditions that need additional attention moving forward.   - Subsequent Annual Wellness Visit - Includes PPPS ()       Services needed: no new services needed at this time  Health Care Screening: recommendations as per orders if indicated.  Referrals offered: none  Counseling provided today:  · Prevent falls and reduce trip hazards; Secure or remove rugs if present   · Maintain working fire alarm and carbon monoxide detectors   · Engage in regular physical activity daily and social activities weekly as tolerated

## 2022-01-04 ENCOUNTER — NON-PROVIDER VISIT (OUTPATIENT)
Dept: INTERNAL MEDICINE | Facility: IMAGING CENTER | Age: 87
End: 2022-01-04
Payer: MEDICARE

## 2022-01-04 ENCOUNTER — HOSPITAL ENCOUNTER (OUTPATIENT)
Facility: MEDICAL CENTER | Age: 87
End: 2022-01-04
Attending: FAMILY MEDICINE
Payer: MEDICARE

## 2022-01-04 DIAGNOSIS — J01.40 ACUTE NON-RECURRENT PANSINUSITIS: ICD-10-CM

## 2022-01-04 DIAGNOSIS — Z20.822 ENCOUNTER FOR LABORATORY TESTING FOR COVID-19 VIRUS: ICD-10-CM

## 2022-01-04 PROCEDURE — U0003 INFECTIOUS AGENT DETECTION BY NUCLEIC ACID (DNA OR RNA); SEVERE ACUTE RESPIRATORY SYNDROME CORONAVIRUS 2 (SARS-COV-2) (CORONAVIRUS DISEASE [COVID-19]), AMPLIFIED PROBE TECHNIQUE, MAKING USE OF HIGH THROUGHPUT TECHNOLOGIES AS DESCRIBED BY CMS-2020-01-R: HCPCS

## 2022-01-04 PROCEDURE — U0005 INFEC AGEN DETEC AMPLI PROBE: HCPCS

## 2022-01-04 RX ORDER — AMOXICILLIN AND CLAVULANATE POTASSIUM 875; 125 MG/1; MG/1
1 TABLET, FILM COATED ORAL 2 TIMES DAILY
Qty: 14 TABLET | Refills: 0 | Status: SHIPPED | OUTPATIENT
Start: 2022-01-04 | End: 2022-01-11

## 2022-01-05 DIAGNOSIS — Z20.822 ENCOUNTER FOR LABORATORY TESTING FOR COVID-19 VIRUS: ICD-10-CM

## 2022-01-05 LAB — COVID ORDER STATUS COVID19: NORMAL

## 2022-01-06 LAB
SARS-COV-2 RNA RESP QL NAA+PROBE: NOTDETECTED
SPECIMEN SOURCE: NORMAL

## 2022-03-30 ENCOUNTER — HOSPITAL ENCOUNTER (OUTPATIENT)
Facility: MEDICAL CENTER | Age: 87
End: 2022-03-30
Attending: FAMILY MEDICINE
Payer: MEDICARE

## 2022-03-30 ENCOUNTER — NON-PROVIDER VISIT (OUTPATIENT)
Dept: INTERNAL MEDICINE | Facility: IMAGING CENTER | Age: 87
End: 2022-03-30
Payer: MEDICARE

## 2022-03-30 PROCEDURE — 80061 LIPID PANEL: CPT

## 2022-03-30 PROCEDURE — 82306 VITAMIN D 25 HYDROXY: CPT

## 2022-03-30 PROCEDURE — 80053 COMPREHEN METABOLIC PANEL: CPT

## 2022-03-31 LAB
25(OH)D3 SERPL-MCNC: 31 NG/ML (ref 30–100)
ALBUMIN SERPL BCP-MCNC: 4.7 G/DL (ref 3.2–4.9)
ALBUMIN/GLOB SERPL: 1.9 G/DL
ALP SERPL-CCNC: 138 U/L (ref 30–99)
ALT SERPL-CCNC: 18 U/L (ref 2–50)
ANION GAP SERPL CALC-SCNC: 13 MMOL/L (ref 7–16)
AST SERPL-CCNC: 25 U/L (ref 12–45)
BILIRUB SERPL-MCNC: 0.7 MG/DL (ref 0.1–1.5)
BUN SERPL-MCNC: 16 MG/DL (ref 8–22)
CALCIUM SERPL-MCNC: 9.6 MG/DL (ref 8.5–10.5)
CHLORIDE SERPL-SCNC: 104 MMOL/L (ref 96–112)
CHOLEST SERPL-MCNC: 185 MG/DL (ref 100–199)
CO2 SERPL-SCNC: 24 MMOL/L (ref 20–33)
CREAT SERPL-MCNC: 0.94 MG/DL (ref 0.5–1.4)
GFR SERPLBLD CREATININE-BSD FMLA CKD-EPI: 57 ML/MIN/1.73 M 2
GLOBULIN SER CALC-MCNC: 2.5 G/DL (ref 1.9–3.5)
GLUCOSE SERPL-MCNC: 101 MG/DL (ref 65–99)
HDLC SERPL-MCNC: 72 MG/DL
LDLC SERPL CALC-MCNC: 97 MG/DL
POTASSIUM SERPL-SCNC: 4 MMOL/L (ref 3.6–5.5)
PROT SERPL-MCNC: 7.2 G/DL (ref 6–8.2)
SODIUM SERPL-SCNC: 141 MMOL/L (ref 135–145)
TRIGL SERPL-MCNC: 79 MG/DL (ref 0–149)

## 2022-04-01 ENCOUNTER — OFFICE VISIT (OUTPATIENT)
Dept: INTERNAL MEDICINE | Facility: IMAGING CENTER | Age: 87
End: 2022-04-01
Payer: MEDICARE

## 2022-04-01 VITALS
WEIGHT: 126 LBS | HEIGHT: 63 IN | TEMPERATURE: 97.9 F | SYSTOLIC BLOOD PRESSURE: 122 MMHG | BODY MASS INDEX: 22.32 KG/M2 | RESPIRATION RATE: 17 BRPM | OXYGEN SATURATION: 95 % | DIASTOLIC BLOOD PRESSURE: 68 MMHG | HEART RATE: 69 BPM

## 2022-04-01 DIAGNOSIS — I10 ESSENTIAL HYPERTENSION: ICD-10-CM

## 2022-04-01 DIAGNOSIS — F43.21 GRIEF REACTION: ICD-10-CM

## 2022-04-01 DIAGNOSIS — E55.9 VITAMIN D DEFICIENCY: ICD-10-CM

## 2022-04-01 DIAGNOSIS — E78.2 MIXED DYSLIPIDEMIA: ICD-10-CM

## 2022-04-01 PROCEDURE — 99214 OFFICE O/P EST MOD 30 MIN: CPT | Performed by: FAMILY MEDICINE

## 2022-04-01 RX ORDER — LISINOPRIL 5 MG/1
5 TABLET ORAL DAILY
Qty: 90 TABLET | Refills: 3 | Status: SHIPPED | OUTPATIENT
Start: 2022-04-01 | End: 2022-12-12

## 2022-04-01 RX ORDER — ATORVASTATIN CALCIUM 20 MG/1
20 TABLET, FILM COATED ORAL
Qty: 90 TABLET | Refills: 3 | Status: SHIPPED | OUTPATIENT
Start: 2022-04-01 | End: 2023-04-10 | Stop reason: SDUPTHER

## 2022-04-01 ASSESSMENT — FIBROSIS 4 INDEX: FIB4 SCORE: 2.84

## 2022-04-01 ASSESSMENT — PATIENT HEALTH QUESTIONNAIRE - PHQ9: CLINICAL INTERPRETATION OF PHQ2 SCORE: 0

## 2022-04-01 NOTE — PROGRESS NOTES
"Chief Complaint   Patient presents with   • Lab Results     Review labs       HPI:  Patient is a 89 y.o. female established patient who presents today to review labs done 3/30/22 for ongoing management. Patient is currently tolerating daily Lisinopril and Liptor use well, denies medication related side effects, and endorses 100% medication compliance. Despite the recent death of her , she feels a great burden has been lifted off of her (he suffered from dementia among other chronic health conditions). She is sleeping better now and remains very active in general. She is UTD with HCM items and is in a very happy mood at our appointment today.     Patient Active Problem List    Diagnosis Date Noted   • PAF (paroxysmal atrial fibrillation) (HCC) 12/15/2020   • Mixed dyslipidemia 12/15/2020   • Elevated hemoglobin A1c 12/15/2020   • History of recurrent UTIs 01/10/2019   • Vitamin D deficiency 09/25/2018   • Systolic murmur 06/29/2017   • Osteoporosis 08/24/2016       Past medical, surgical, family, and social history was reviewed and updated in Epic chart by me today.     Medications and allergies reviewed and updated in Epic chart by me today.     Lab results 3/30/22 reviewed with patient at visit today.    ROS:  Pertinent positives listed above in HPI. All other systems have been reviewed and are negative.    PE:   /68 (BP Location: Left arm, Patient Position: Sitting, BP Cuff Size: Adult)   Pulse 69   Temp 36.6 °C (97.9 °F) (Temporal)   Resp 17   Ht 1.6 m (5' 3\")   Wt 57.2 kg (126 lb)   SpO2 95%   BMI 22.32 kg/m²   Vital signs reviewed with patient.     Gen: Well developed; well nourished; no acute distress; age appropriate appearance   Neuro: No focal deficits noted   Psych: AAOx4; mood and affect are at her baseline    A/P:  1. Grief reaction  Patient is doing exceedingly well since the death of her elderly . She remains very active and has close family support here in [place].     2. Mixed " dyslipidemia  Markedly improved since starting nightly Lipitor use and watching diet intake. Recommend patient continue current healthy lifestyle choices and ongoing Lipitor use. New RX sent to pharmacy.   - atorvastatin (LIPITOR) 20 MG Tab; Take 1 Tablet by mouth at bedtime.  Dispense: 90 Tablet; Refill: 3    3. Essential hypertension  Improved since low dose Lisinopril use. Recommend patient continue current medication use and active lifestyle. New RX sent to pharmacy.   - lisinopril (PRINIVIL) 5 MG Tab; Take 1 Tablet by mouth every day.  Dispense: 90 Tablet; Refill: 3    4. Vitamin D deficiency  Normalized since patient started taking daily Vitamin D gummy supplementation. Recommend patient continue current use and will follow.

## 2022-04-20 ENCOUNTER — HOSPITAL ENCOUNTER (OUTPATIENT)
Facility: MEDICAL CENTER | Age: 87
End: 2022-04-20
Attending: FAMILY MEDICINE
Payer: MEDICARE

## 2022-04-20 ENCOUNTER — NON-PROVIDER VISIT (OUTPATIENT)
Dept: INTERNAL MEDICINE | Facility: IMAGING CENTER | Age: 87
End: 2022-04-20
Payer: MEDICARE

## 2022-04-20 DIAGNOSIS — N30.00 ACUTE CYSTITIS WITHOUT HEMATURIA: ICD-10-CM

## 2022-04-20 DIAGNOSIS — R30.0 DYSURIA: ICD-10-CM

## 2022-04-20 DIAGNOSIS — R82.90 ABNORMAL URINALYSIS: ICD-10-CM

## 2022-04-20 LAB
APPEARANCE UR: NORMAL
BILIRUB UR STRIP-MCNC: NEGATIVE MG/DL
COLOR UR AUTO: YELLOW
GLUCOSE UR STRIP.AUTO-MCNC: NEGATIVE MG/DL
KETONES UR STRIP.AUTO-MCNC: NEGATIVE MG/DL
LEUKOCYTE ESTERASE UR QL STRIP.AUTO: NORMAL
NITRITE UR QL STRIP.AUTO: POSITIVE
PH UR STRIP.AUTO: 6 [PH] (ref 5–8)
PROT UR QL STRIP: NEGATIVE MG/DL
RBC UR QL AUTO: NORMAL
SP GR UR STRIP.AUTO: 1.01
UROBILINOGEN UR STRIP-MCNC: 0.2 MG/DL

## 2022-04-20 PROCEDURE — 87086 URINE CULTURE/COLONY COUNT: CPT

## 2022-04-20 PROCEDURE — 87077 CULTURE AEROBIC IDENTIFY: CPT

## 2022-04-20 PROCEDURE — 87186 SC STD MICRODIL/AGAR DIL: CPT

## 2022-04-20 RX ORDER — CEFDINIR 300 MG/1
300 CAPSULE ORAL 2 TIMES DAILY
Qty: 10 CAPSULE | Refills: 0 | Status: SHIPPED | OUTPATIENT
Start: 2022-04-20 | End: 2022-04-25

## 2022-09-26 ENCOUNTER — APPOINTMENT (OUTPATIENT)
Dept: INTERNAL MEDICINE | Facility: IMAGING CENTER | Age: 87
End: 2022-09-26
Payer: MEDICARE

## 2022-09-26 ENCOUNTER — NON-PROVIDER VISIT (OUTPATIENT)
Dept: INTERNAL MEDICINE | Facility: IMAGING CENTER | Age: 87
End: 2022-09-26
Payer: MEDICARE

## 2022-09-26 DIAGNOSIS — Z20.822 ENCOUNTER FOR LABORATORY TESTING FOR COVID-19 VIRUS: ICD-10-CM

## 2022-09-26 LAB
EXTERNAL QUALITY CONTROL: ABNORMAL
INT CON NEG: ABNORMAL
INT CON POS: ABNORMAL
SARS-COV+SARS-COV-2 AG RESP QL IA.RAPID: POSITIVE

## 2022-09-26 PROCEDURE — 87426 SARSCOV CORONAVIRUS AG IA: CPT | Performed by: FAMILY MEDICINE

## 2022-10-03 ENCOUNTER — HOSPITAL ENCOUNTER (OUTPATIENT)
Facility: MEDICAL CENTER | Age: 87
End: 2022-10-03
Attending: FAMILY MEDICINE
Payer: MEDICARE

## 2022-10-03 ENCOUNTER — NON-PROVIDER VISIT (OUTPATIENT)
Dept: INTERNAL MEDICINE | Facility: IMAGING CENTER | Age: 87
End: 2022-10-03
Payer: MEDICARE

## 2022-10-03 DIAGNOSIS — R10.9 FLANK PAIN: ICD-10-CM

## 2022-10-03 DIAGNOSIS — R35.0 URINE FREQUENCY: ICD-10-CM

## 2022-10-03 DIAGNOSIS — R30.0 DYSURIA: ICD-10-CM

## 2022-10-03 PROCEDURE — 87086 URINE CULTURE/COLONY COUNT: CPT

## 2022-10-03 PROCEDURE — 87077 CULTURE AEROBIC IDENTIFY: CPT

## 2022-10-03 PROCEDURE — 87186 SC STD MICRODIL/AGAR DIL: CPT

## 2022-10-03 PROCEDURE — 81001 URINALYSIS AUTO W/SCOPE: CPT

## 2022-10-04 DIAGNOSIS — N30.00 ACUTE CYSTITIS WITHOUT HEMATURIA: ICD-10-CM

## 2022-10-04 LAB
APPEARANCE UR: ABNORMAL
BACTERIA #/AREA URNS HPF: ABNORMAL /HPF
BILIRUB UR QL STRIP.AUTO: NEGATIVE
COLOR UR: YELLOW
EPI CELLS #/AREA URNS HPF: NEGATIVE /HPF
GLUCOSE UR STRIP.AUTO-MCNC: NEGATIVE MG/DL
HYALINE CASTS #/AREA URNS LPF: ABNORMAL /LPF
KETONES UR STRIP.AUTO-MCNC: NEGATIVE MG/DL
LEUKOCYTE ESTERASE UR QL STRIP.AUTO: ABNORMAL
MICRO URNS: ABNORMAL
NITRITE UR QL STRIP.AUTO: POSITIVE
PH UR STRIP.AUTO: 5.5 [PH] (ref 5–8)
PROT UR QL STRIP: NEGATIVE MG/DL
RBC # URNS HPF: ABNORMAL /HPF
RBC UR QL AUTO: ABNORMAL
SP GR UR STRIP.AUTO: 1.01
UROBILINOGEN UR STRIP.AUTO-MCNC: 0.2 MG/DL
WBC #/AREA URNS HPF: ABNORMAL /HPF

## 2022-10-04 RX ORDER — CEFDINIR 300 MG/1
300 CAPSULE ORAL 2 TIMES DAILY
Qty: 10 CAPSULE | Refills: 0 | Status: SHIPPED | OUTPATIENT
Start: 2022-10-04 | End: 2022-10-09

## 2022-10-12 ENCOUNTER — TELEPHONE (OUTPATIENT)
Dept: INTERNAL MEDICINE | Facility: IMAGING CENTER | Age: 87
End: 2022-10-12
Payer: MEDICARE

## 2022-10-12 NOTE — TELEPHONE ENCOUNTER
Char is asking for guidance on COVID booster.  She recently had COVID 3 weeks ago.  Recommend she wait 3 mo post active COVID infection.  She understand and will plan on getting bivalent booster late December 2022.

## 2022-10-26 ENCOUNTER — APPOINTMENT (RX ONLY)
Dept: URBAN - METROPOLITAN AREA CLINIC 20 | Facility: CLINIC | Age: 87
Setting detail: DERMATOLOGY
End: 2022-10-26

## 2022-10-26 DIAGNOSIS — D22 MELANOCYTIC NEVI: ICD-10-CM

## 2022-10-26 DIAGNOSIS — L82.1 OTHER SEBORRHEIC KERATOSIS: ICD-10-CM

## 2022-10-26 DIAGNOSIS — H61.03 CHONDRITIS OF EXTERNAL EAR: ICD-10-CM

## 2022-10-26 DIAGNOSIS — L81.4 OTHER MELANIN HYPERPIGMENTATION: ICD-10-CM

## 2022-10-26 DIAGNOSIS — L57.8 OTHER SKIN CHANGES DUE TO CHRONIC EXPOSURE TO NONIONIZING RADIATION: ICD-10-CM

## 2022-10-26 DIAGNOSIS — Z86.007 PERSONAL HISTORY OF IN-SITU NEOPLASM OF SKIN: ICD-10-CM

## 2022-10-26 DIAGNOSIS — D18.0 HEMANGIOMA: ICD-10-CM

## 2022-10-26 DIAGNOSIS — Z87.2 PERSONAL HISTORY OF DISEASES OF THE SKIN AND SUBCUTANEOUS TISSUE: ICD-10-CM

## 2022-10-26 PROBLEM — D18.01 HEMANGIOMA OF SKIN AND SUBCUTANEOUS TISSUE: Status: ACTIVE | Noted: 2022-10-26

## 2022-10-26 PROBLEM — H61.031 CHONDRITIS OF RIGHT EXTERNAL EAR: Status: ACTIVE | Noted: 2022-10-26

## 2022-10-26 PROBLEM — D22.5 MELANOCYTIC NEVI OF TRUNK: Status: ACTIVE | Noted: 2022-10-26

## 2022-10-26 PROCEDURE — ? COUNSELING

## 2022-10-26 PROCEDURE — 99213 OFFICE O/P EST LOW 20 MIN: CPT

## 2022-10-26 ASSESSMENT — LOCATION SIMPLE DESCRIPTION DERM
LOCATION SIMPLE: LEFT CHEEK
LOCATION SIMPLE: LEFT UPPER ARM
LOCATION SIMPLE: RIGHT FOREHEAD
LOCATION SIMPLE: LEFT FOREARM
LOCATION SIMPLE: LEFT CALF
LOCATION SIMPLE: RIGHT POSTERIOR UPPER ARM
LOCATION SIMPLE: CHEST
LOCATION SIMPLE: RIGHT CHEEK
LOCATION SIMPLE: RIGHT FOREARM
LOCATION SIMPLE: LEFT THIGH
LOCATION SIMPLE: RIGHT THIGH
LOCATION SIMPLE: RIGHT UPPER BACK
LOCATION SIMPLE: RIGHT UPPER ARM
LOCATION SIMPLE: RIGHT EAR

## 2022-10-26 ASSESSMENT — LOCATION DETAILED DESCRIPTION DERM
LOCATION DETAILED: RIGHT MID-UPPER BACK
LOCATION DETAILED: LEFT ANTERIOR PROXIMAL UPPER ARM
LOCATION DETAILED: RIGHT PROXIMAL DORSAL FOREARM
LOCATION DETAILED: RIGHT FOREHEAD
LOCATION DETAILED: RIGHT SUPERIOR MEDIAL UPPER BACK
LOCATION DETAILED: LEFT MEDIAL SUPERIOR CHEST
LOCATION DETAILED: RIGHT SUPERIOR HELIX
LOCATION DETAILED: LEFT INFERIOR CENTRAL MALAR CHEEK
LOCATION DETAILED: RIGHT ANTERIOR PROXIMAL UPPER ARM
LOCATION DETAILED: RIGHT CENTRAL MALAR CHEEK
LOCATION DETAILED: LEFT PROXIMAL DORSAL FOREARM
LOCATION DETAILED: LEFT DISTAL CALF
LOCATION DETAILED: RIGHT DISTAL POSTERIOR UPPER ARM
LOCATION DETAILED: RIGHT INFERIOR UPPER BACK
LOCATION DETAILED: LEFT ANTERIOR DISTAL THIGH
LOCATION DETAILED: RIGHT ANTERIOR DISTAL THIGH

## 2022-10-26 ASSESSMENT — LOCATION ZONE DERM
LOCATION ZONE: ARM
LOCATION ZONE: FACE
LOCATION ZONE: TRUNK
LOCATION ZONE: LEG
LOCATION ZONE: EAR

## 2022-11-02 ENCOUNTER — HOSPITAL ENCOUNTER (OUTPATIENT)
Facility: MEDICAL CENTER | Age: 87
End: 2022-11-02
Attending: FAMILY MEDICINE
Payer: MEDICARE

## 2022-11-02 ENCOUNTER — NON-PROVIDER VISIT (OUTPATIENT)
Dept: INTERNAL MEDICINE | Facility: IMAGING CENTER | Age: 87
End: 2022-11-02
Payer: MEDICARE

## 2022-11-02 DIAGNOSIS — R35.0 URINE FREQUENCY: ICD-10-CM

## 2022-11-02 DIAGNOSIS — R30.0 DYSURIA: ICD-10-CM

## 2022-11-02 LAB
APPEARANCE UR: ABNORMAL
BACTERIA #/AREA URNS HPF: ABNORMAL /HPF
BILIRUB UR QL STRIP.AUTO: NEGATIVE
COLOR UR: YELLOW
EPI CELLS #/AREA URNS HPF: NEGATIVE /HPF
GLUCOSE UR STRIP.AUTO-MCNC: NEGATIVE MG/DL
HYALINE CASTS #/AREA URNS LPF: ABNORMAL /LPF
KETONES UR STRIP.AUTO-MCNC: NEGATIVE MG/DL
LEUKOCYTE ESTERASE UR QL STRIP.AUTO: ABNORMAL
MICRO URNS: ABNORMAL
NITRITE UR QL STRIP.AUTO: POSITIVE
PH UR STRIP.AUTO: 6.5 [PH] (ref 5–8)
PROT UR QL STRIP: NEGATIVE MG/DL
RBC # URNS HPF: ABNORMAL /HPF
RBC UR QL AUTO: ABNORMAL
SP GR UR STRIP.AUTO: 1
UROBILINOGEN UR STRIP.AUTO-MCNC: 0.2 MG/DL
WBC #/AREA URNS HPF: ABNORMAL /HPF

## 2022-11-02 PROCEDURE — 87086 URINE CULTURE/COLONY COUNT: CPT

## 2022-11-02 PROCEDURE — 87186 SC STD MICRODIL/AGAR DIL: CPT

## 2022-11-02 PROCEDURE — 87077 CULTURE AEROBIC IDENTIFY: CPT

## 2022-11-02 PROCEDURE — 81001 URINALYSIS AUTO W/SCOPE: CPT

## 2022-11-05 DIAGNOSIS — N39.0 RECURRENT UTI: ICD-10-CM

## 2022-11-05 RX ORDER — SULFAMETHOXAZOLE AND TRIMETHOPRIM 800; 160 MG/1; MG/1
1 TABLET ORAL 2 TIMES DAILY
Qty: 10 TABLET | Refills: 0 | Status: SHIPPED | OUTPATIENT
Start: 2022-11-05 | End: 2022-11-10

## 2022-11-09 ENCOUNTER — PATIENT MESSAGE (OUTPATIENT)
Dept: HEALTH INFORMATION MANAGEMENT | Facility: OTHER | Age: 87
End: 2022-11-09

## 2022-11-14 ENCOUNTER — OFFICE VISIT (OUTPATIENT)
Dept: INTERNAL MEDICINE | Facility: IMAGING CENTER | Age: 87
End: 2022-11-14
Payer: MEDICARE

## 2022-11-14 VITALS
SYSTOLIC BLOOD PRESSURE: 168 MMHG | OXYGEN SATURATION: 97 % | BODY MASS INDEX: 19.29 KG/M2 | HEIGHT: 66 IN | WEIGHT: 120 LBS | TEMPERATURE: 99.7 F | DIASTOLIC BLOOD PRESSURE: 82 MMHG | RESPIRATION RATE: 17 BRPM | HEART RATE: 94 BPM

## 2022-11-14 DIAGNOSIS — I10 BENIGN ESSENTIAL HTN: ICD-10-CM

## 2022-11-14 DIAGNOSIS — R53.1 WEAKNESS: ICD-10-CM

## 2022-11-14 DIAGNOSIS — E78.2 MIXED DYSLIPIDEMIA: ICD-10-CM

## 2022-11-14 PROBLEM — Z86.16 HISTORY OF COVID-19: Status: ACTIVE | Noted: 2022-11-14

## 2022-11-14 PROCEDURE — 99214 OFFICE O/P EST MOD 30 MIN: CPT | Performed by: FAMILY MEDICINE

## 2022-11-14 ASSESSMENT — FIBROSIS 4 INDEX: FIB4 SCORE: 2.84

## 2022-11-15 ENCOUNTER — NON-PROVIDER VISIT (OUTPATIENT)
Dept: INTERNAL MEDICINE | Facility: IMAGING CENTER | Age: 87
End: 2022-11-15
Payer: MEDICARE

## 2022-11-15 VITALS — DIASTOLIC BLOOD PRESSURE: 80 MMHG | SYSTOLIC BLOOD PRESSURE: 154 MMHG | HEART RATE: 74 BPM

## 2022-11-15 NOTE — PROGRESS NOTES
"Chief Complaint   Patient presents with    Syncope     Near syncopal about 2 weeks ago when getting up in the morning. She felt dizzy, faint, sweaty. After 20 minutes it went away.        HPI:  Patient is a 89 y.o. female established patient who presents today to discuss new event that occurred approximately two weeks ago. Upon wakening, she felt dizzy, faint, and sweaty without overt syncope. She sat back down and rested - event lasted about twenty minutes and resolved without intervention. She has not experienced another similar episode but self discontinued daily Lisinopril and Atorvastatin use at that time. She has not been monitoring home vital signs and reports having a grazing type of eating pattern with long periods of fasting (she has read that fasting can be good for her). There was approximately 19 hrs of fasting prior to her pre syncopal event two weeks ago, and she reports drinking water throughout the day. She is otherwise stable and continues to live independently/ has supportive family here in [place].     Patient Active Problem List    Diagnosis Date Noted    History of COVID-19 11/14/2022    PAF (paroxysmal atrial fibrillation) (HCC) 12/15/2020    Mixed dyslipidemia 12/15/2020    Elevated hemoglobin A1c 12/15/2020    History of recurrent UTIs 01/10/2019    Vitamin D deficiency 09/25/2018    Benign essential HTN 07/13/2017    Systolic murmur 06/29/2017    Osteoporosis 08/24/2016       Past medical, surgical, family, and social history was reviewed and updated in Epic chart by me today.     Medications and allergies reviewed and updated in Epic chart by me today.     ROS:  Pertinent positives listed above in HPI. All other systems have been reviewed and are negative.    PE:   BP (!) 168/82 (BP Location: Left arm, Patient Position: Sitting, BP Cuff Size: Adult)   Pulse 94   Temp 37.6 °C (99.7 °F) (Temporal)   Resp 17   Ht 1.676 m (5' 6\")   Wt 54.4 kg (120 lb)   SpO2 97%   BMI 19.37 kg/m²   Vital " signs reviewed with patient.     Gen: Well developed; well nourished; no acute distress; younger than stated age in appearance   CV: Regular rate and rhythm; S1/ S2 present; no murmur, gallop or rub noted  Pulm: No respiratory distress; clear to ascultation b/l; no wheezing or stridor noted b/l  Extremities: No peripheral edema b/l LE extremities/ no clubbing nor cyanosis noted  Skin: Warm and dry; no rashes noted   Neuro: No new focal deficits noted   Psych: AAOx4; mood and affect are at her baseline     A/P:  1. Weakness  Patient experienced episode of feeling dizzy, faint, and sweaty without syncope upon wakening approximately two weeks ago as described in HPI. The event passed within 20 minutes without intervention and has not occurred again. Upon further questioning, patient had not eaten for 19 hrs prior to the event and hydration status was questionable. We discussed patient avoiding long periods of fasting and ensure she is hydrating adequately throughout her waking hours. Also encouraged patient to take vital signs if she does not feel well moving forward.     2. Benign essential HTN  Uncontrolled/ patient self discontinued daily Lisinopril use two weeks ago per HPI details. Recommend patient restart daily Lisinopril use, change batteries in home blood pressure machine, and start taking home BP/HR every AM/ every HS. Patient is to contact me if blood pressures averages remain >140/80 or <100/60 with medication use.     3. Mixed dyslipidemia  Refer to details in HPI. Recommend patient restart nightly Lipitor use for ongoing lipid management and risk reduction.

## 2022-11-21 ENCOUNTER — HOSPITAL ENCOUNTER (OUTPATIENT)
Facility: MEDICAL CENTER | Age: 87
End: 2022-11-21
Attending: FAMILY MEDICINE
Payer: MEDICARE

## 2022-11-21 ENCOUNTER — NON-PROVIDER VISIT (OUTPATIENT)
Dept: INTERNAL MEDICINE | Facility: IMAGING CENTER | Age: 87
End: 2022-11-21
Payer: MEDICARE

## 2022-11-21 DIAGNOSIS — R82.90 ABNORMAL URINALYSIS: ICD-10-CM

## 2022-11-21 DIAGNOSIS — R30.0 DYSURIA: ICD-10-CM

## 2022-11-21 LAB
APPEARANCE UR: NORMAL
BILIRUB UR STRIP-MCNC: NEGATIVE MG/DL
COLOR UR AUTO: YELLOW
GLUCOSE UR STRIP.AUTO-MCNC: NEGATIVE MG/DL
KETONES UR STRIP.AUTO-MCNC: NEGATIVE MG/DL
LEUKOCYTE ESTERASE UR QL STRIP.AUTO: NORMAL
NITRITE UR QL STRIP.AUTO: NEGATIVE
PH UR STRIP.AUTO: 5.5 [PH] (ref 5–8)
PROT UR QL STRIP: 30 MG/DL
RBC UR QL AUTO: NORMAL
SP GR UR STRIP.AUTO: <=1.005
UROBILINOGEN UR STRIP-MCNC: 0.2 MG/DL

## 2022-11-21 PROCEDURE — 81002 URINALYSIS NONAUTO W/O SCOPE: CPT | Performed by: FAMILY MEDICINE

## 2022-11-21 PROCEDURE — 87077 CULTURE AEROBIC IDENTIFY: CPT

## 2022-11-21 PROCEDURE — 87086 URINE CULTURE/COLONY COUNT: CPT

## 2022-11-21 PROCEDURE — 87186 SC STD MICRODIL/AGAR DIL: CPT

## 2022-11-23 DIAGNOSIS — N39.0 RECURRENT UTI: ICD-10-CM

## 2022-11-23 RX ORDER — SULFAMETHOXAZOLE AND TRIMETHOPRIM 800; 160 MG/1; MG/1
1 TABLET ORAL 2 TIMES DAILY
Qty: 10 TABLET | Refills: 0 | Status: SHIPPED | OUTPATIENT
Start: 2022-11-23 | End: 2022-11-28

## 2022-11-28 ENCOUNTER — OFFICE VISIT (OUTPATIENT)
Dept: INTERNAL MEDICINE | Facility: IMAGING CENTER | Age: 87
End: 2022-11-28
Payer: MEDICARE

## 2022-11-28 VITALS
WEIGHT: 119.93 LBS | SYSTOLIC BLOOD PRESSURE: 172 MMHG | HEART RATE: 73 BPM | BODY MASS INDEX: 19.27 KG/M2 | OXYGEN SATURATION: 98 % | RESPIRATION RATE: 17 BRPM | HEIGHT: 66 IN | TEMPERATURE: 98.2 F | DIASTOLIC BLOOD PRESSURE: 84 MMHG

## 2022-11-28 DIAGNOSIS — I10 BENIGN ESSENTIAL HTN: ICD-10-CM

## 2022-11-28 PROCEDURE — 99213 OFFICE O/P EST LOW 20 MIN: CPT | Performed by: FAMILY MEDICINE

## 2022-11-28 ASSESSMENT — FIBROSIS 4 INDEX: FIB4 SCORE: 2.84

## 2022-11-29 NOTE — PROGRESS NOTES
"Chief Complaint   Patient presents with    Hypertension Follow-up       HPI:  Patient is a 89 y.o. female established patient who presents today for a follow up regarding uncontrolled hypertension. Since our visit together on 11/14/22, patient has resumed Lisinopril 5 mg daily use (she has temporarily self discontinued medication two weeks prior) but has not been monitoring home blood pressure readings. She reports feeling very anxious as the holiday season progresses and is aware that this can increase her blood pressure also. She is otherwise stable and completing abx treat for a urinary tract infection.     Patient Active Problem List    Diagnosis Date Noted    History of COVID-19 11/14/2022    PAF (paroxysmal atrial fibrillation) (HCC) 12/15/2020    Mixed dyslipidemia 12/15/2020    Elevated hemoglobin A1c 12/15/2020    History of recurrent UTIs 01/10/2019    Vitamin D deficiency 09/25/2018    Benign essential HTN 07/13/2017    Systolic murmur 06/29/2017    Osteoporosis 08/24/2016       Past medical, surgical, family, and social history was reviewed and updated in Epic chart by me today.     Medications and allergies reviewed and updated in Epic chart by me today.     ROS:  Pertinent positives listed above in HPI. All other systems have been reviewed and are negative.    PE:   BP (!) 172/84 (BP Location: Left arm, Patient Position: Sitting, BP Cuff Size: Adult)   Pulse 73   Temp 36.8 °C (98.2 °F) (Temporal)   Resp 17   Ht 1.676 m (5' 6\")   Wt 54.4 kg (119 lb 14.9 oz)   SpO2 98%   BMI 19.36 kg/m²   Vital signs reviewed with patient.     Gen: Well developed; well nourished; no acute distress; age appropriate appearance   Neuro: No focal deficits noted   Psych: AAOx4; mood and affect are appropriate    A/P:  1. Benign essential HTN  Uncontrolled/ condition discussed with patient at visit. Recommend patient increase Lisinopril dose to 10 mg daily (can use up 2 x 5 mg tabs daily) and start taking home BP every " morning and every evening. Patient encouraged to follow up with me in two weeks for further medical management.

## 2022-12-12 ENCOUNTER — OFFICE VISIT (OUTPATIENT)
Dept: INTERNAL MEDICINE | Facility: IMAGING CENTER | Age: 87
End: 2022-12-12
Payer: MEDICARE

## 2022-12-12 VITALS
OXYGEN SATURATION: 100 % | RESPIRATION RATE: 15 BRPM | DIASTOLIC BLOOD PRESSURE: 82 MMHG | TEMPERATURE: 97.5 F | HEART RATE: 72 BPM | SYSTOLIC BLOOD PRESSURE: 140 MMHG

## 2022-12-12 DIAGNOSIS — E78.2 MIXED DYSLIPIDEMIA: ICD-10-CM

## 2022-12-12 DIAGNOSIS — N39.0 RECURRENT UTI: ICD-10-CM

## 2022-12-12 DIAGNOSIS — R73.9 HYPERGLYCEMIA: ICD-10-CM

## 2022-12-12 DIAGNOSIS — R53.83 OTHER FATIGUE: ICD-10-CM

## 2022-12-12 DIAGNOSIS — I10 BENIGN ESSENTIAL HTN: ICD-10-CM

## 2022-12-12 DIAGNOSIS — E55.9 VITAMIN D DEFICIENCY: ICD-10-CM

## 2022-12-12 PROCEDURE — 99213 OFFICE O/P EST LOW 20 MIN: CPT | Performed by: FAMILY MEDICINE

## 2022-12-12 RX ORDER — LISINOPRIL 10 MG/1
10 TABLET ORAL DAILY
Qty: 90 TABLET | Refills: 3 | Status: SHIPPED | OUTPATIENT
Start: 2022-12-12 | End: 2023-09-05 | Stop reason: SDUPTHER

## 2022-12-12 NOTE — PROGRESS NOTES
Chief Complaint   Patient presents with    Hypertension Follow-up       HPI:  Patient is a 89 y.o. female established patient who presents today for a two week follow up visit regarding blood pressure management. She has been taking Lisinopril 10 mg daily at home (using up her 5 mg tabs) and monitoring home blood pressure twice daily. Home BP log shows BP averages: 120s -130s/ 70s/80s which is far improved as compared to when she was either off all medication use or only taking 5 mg daily. She denies other new health concerns and is frazzled this morning due to icy parking lot outside our office.     Patient Active Problem List    Diagnosis Date Noted    History of COVID-19 11/14/2022    PAF (paroxysmal atrial fibrillation) (HCC) 12/15/2020    Mixed dyslipidemia 12/15/2020    Elevated hemoglobin A1c 12/15/2020    History of recurrent UTIs 01/10/2019    Vitamin D deficiency 09/25/2018    Benign essential HTN 07/13/2017    Systolic murmur 06/29/2017    Osteoporosis 08/24/2016       Past medical, surgical, family, and social history was reviewed and updated in Epic chart by me today.     Medications and allergies reviewed and updated in Epic chart by me today.     ROS:  Pertinent positives listed above in HPI. All other systems have been reviewed and are negative.    PE:   BP (!) 140/82 (BP Location: Left arm, Patient Position: Sitting, BP Cuff Size: Adult)   Pulse 72   Temp 36.4 °C (97.5 °F) (Temporal)   Resp 15   SpO2 100%   Vital signs reviewed with patient.     Gen: Well developed; well nourished; no acute distress; age appropriate appearance   Skin: Warm and dry; no rashes noted   Neuro: No focal deficits noted   Psych: AAOx4; mood and affect are at her baseline     A/P:  1. Benign essential HTN  Markedly improved blood pressure control with current Lisinopril 10 mg daily use. Recommend patient continue daily medication use, home blood pressure monitoring, and new RX sent to pharmacy.   - lisinopril (PRINIVIL)  10 MG Tab; Take 1 Tablet by mouth every day.  Dispense: 90 Tablet; Refill: 3

## 2022-12-27 ENCOUNTER — NON-PROVIDER VISIT (OUTPATIENT)
Dept: INTERNAL MEDICINE | Facility: IMAGING CENTER | Age: 87
End: 2022-12-27
Payer: MEDICARE

## 2022-12-27 ENCOUNTER — HOSPITAL ENCOUNTER (OUTPATIENT)
Facility: MEDICAL CENTER | Age: 87
End: 2022-12-27
Attending: FAMILY MEDICINE
Payer: MEDICARE

## 2022-12-27 DIAGNOSIS — R30.0 DYSURIA: ICD-10-CM

## 2022-12-27 DIAGNOSIS — R82.90 ABNORMAL URINALYSIS: ICD-10-CM

## 2022-12-27 LAB
APPEARANCE UR: CLEAR
BILIRUB UR STRIP-MCNC: NEGATIVE MG/DL
COLOR UR AUTO: YELLOW
GLUCOSE UR STRIP.AUTO-MCNC: NEGATIVE MG/DL
KETONES UR STRIP.AUTO-MCNC: NEGATIVE MG/DL
LEUKOCYTE ESTERASE UR QL STRIP.AUTO: NORMAL
NITRITE UR QL STRIP.AUTO: NEGATIVE
PH UR STRIP.AUTO: 5 [PH] (ref 5–8)
PROT UR QL STRIP: NEGATIVE MG/DL
RBC UR QL AUTO: NORMAL
SP GR UR STRIP.AUTO: 1.01
UROBILINOGEN UR STRIP-MCNC: 0.2 MG/DL

## 2022-12-27 PROCEDURE — 87086 URINE CULTURE/COLONY COUNT: CPT

## 2022-12-27 PROCEDURE — 87186 SC STD MICRODIL/AGAR DIL: CPT

## 2022-12-27 PROCEDURE — 87077 CULTURE AEROBIC IDENTIFY: CPT

## 2022-12-27 PROCEDURE — 81002 URINALYSIS NONAUTO W/O SCOPE: CPT | Performed by: FAMILY MEDICINE

## 2022-12-28 DIAGNOSIS — R82.90 ABNORMAL URINALYSIS: ICD-10-CM

## 2022-12-30 DIAGNOSIS — N39.0 RECURRENT UTI: ICD-10-CM

## 2022-12-30 RX ORDER — AMOXICILLIN AND CLAVULANATE POTASSIUM 875; 125 MG/1; MG/1
1 TABLET, FILM COATED ORAL 2 TIMES DAILY
Qty: 10 TABLET | Refills: 0 | Status: SHIPPED | OUTPATIENT
Start: 2022-12-30 | End: 2023-01-04

## 2023-01-10 ENCOUNTER — HOSPITAL ENCOUNTER (OUTPATIENT)
Facility: MEDICAL CENTER | Age: 88
End: 2023-01-10
Attending: FAMILY MEDICINE
Payer: MEDICARE

## 2023-01-10 ENCOUNTER — NON-PROVIDER VISIT (OUTPATIENT)
Dept: INTERNAL MEDICINE | Facility: IMAGING CENTER | Age: 88
End: 2023-01-10
Payer: MEDICARE

## 2023-01-10 DIAGNOSIS — R73.9 HYPERGLYCEMIA: ICD-10-CM

## 2023-01-10 DIAGNOSIS — E78.2 MIXED DYSLIPIDEMIA: ICD-10-CM

## 2023-01-10 DIAGNOSIS — E55.9 VITAMIN D DEFICIENCY: ICD-10-CM

## 2023-01-10 DIAGNOSIS — I10 BENIGN ESSENTIAL HTN: ICD-10-CM

## 2023-01-10 DIAGNOSIS — R53.83 OTHER FATIGUE: ICD-10-CM

## 2023-01-10 DIAGNOSIS — N39.0 RECURRENT UTI: ICD-10-CM

## 2023-01-10 LAB
25(OH)D3 SERPL-MCNC: 55 NG/ML (ref 30–100)
ALBUMIN SERPL BCP-MCNC: 4.2 G/DL (ref 3.2–4.9)
ALBUMIN/GLOB SERPL: 1.6 G/DL
ALP SERPL-CCNC: 379 U/L (ref 30–99)
ALT SERPL-CCNC: 51 U/L (ref 2–50)
ANION GAP SERPL CALC-SCNC: 12 MMOL/L (ref 7–16)
APPEARANCE UR: ABNORMAL
AST SERPL-CCNC: 38 U/L (ref 12–45)
BACTERIA #/AREA URNS HPF: ABNORMAL /HPF
BASOPHILS # BLD AUTO: 0.6 % (ref 0–1.8)
BASOPHILS # BLD: 0.04 K/UL (ref 0–0.12)
BILIRUB SERPL-MCNC: 0.6 MG/DL (ref 0.1–1.5)
BILIRUB UR QL STRIP.AUTO: NEGATIVE
BUN SERPL-MCNC: 17 MG/DL (ref 8–22)
CALCIUM ALBUM COR SERPL-MCNC: 9.1 MG/DL (ref 8.5–10.5)
CALCIUM SERPL-MCNC: 9.3 MG/DL (ref 8.5–10.5)
CHLORIDE SERPL-SCNC: 102 MMOL/L (ref 96–112)
CHOLEST SERPL-MCNC: 164 MG/DL (ref 100–199)
CO2 SERPL-SCNC: 25 MMOL/L (ref 20–33)
COLOR UR: YELLOW
CREAT SERPL-MCNC: 0.79 MG/DL (ref 0.5–1.4)
EOSINOPHIL # BLD AUTO: 0.07 K/UL (ref 0–0.51)
EOSINOPHIL NFR BLD: 1 % (ref 0–6.9)
EPI CELLS #/AREA URNS HPF: NEGATIVE /HPF
ERYTHROCYTE [DISTWIDTH] IN BLOOD BY AUTOMATED COUNT: 45.5 FL (ref 35.9–50)
EST. AVERAGE GLUCOSE BLD GHB EST-MCNC: 120 MG/DL
GFR SERPLBLD CREATININE-BSD FMLA CKD-EPI: 70 ML/MIN/1.73 M 2
GLOBULIN SER CALC-MCNC: 2.7 G/DL (ref 1.9–3.5)
GLUCOSE SERPL-MCNC: 112 MG/DL (ref 65–99)
GLUCOSE UR STRIP.AUTO-MCNC: NEGATIVE MG/DL
HBA1C MFR BLD: 5.8 % (ref 4–5.6)
HCT VFR BLD AUTO: 41.4 % (ref 37–47)
HDLC SERPL-MCNC: 69 MG/DL
HGB BLD-MCNC: 13.9 G/DL (ref 12–16)
HYALINE CASTS #/AREA URNS LPF: ABNORMAL /LPF
IMM GRANULOCYTES # BLD AUTO: 0.04 K/UL (ref 0–0.11)
IMM GRANULOCYTES NFR BLD AUTO: 0.6 % (ref 0–0.9)
KETONES UR STRIP.AUTO-MCNC: NEGATIVE MG/DL
LDLC SERPL CALC-MCNC: 80 MG/DL
LEUKOCYTE ESTERASE UR QL STRIP.AUTO: ABNORMAL
LYMPHOCYTES # BLD AUTO: 1.51 K/UL (ref 1–4.8)
LYMPHOCYTES NFR BLD: 21.2 % (ref 22–41)
MCH RBC QN AUTO: 31 PG (ref 27–33)
MCHC RBC AUTO-ENTMCNC: 33.6 G/DL (ref 33.6–35)
MCV RBC AUTO: 92.4 FL (ref 81.4–97.8)
MICRO URNS: ABNORMAL
MONOCYTES # BLD AUTO: 0.54 K/UL (ref 0–0.85)
MONOCYTES NFR BLD AUTO: 7.6 % (ref 0–13.4)
NEUTROPHILS # BLD AUTO: 4.91 K/UL (ref 2–7.15)
NEUTROPHILS NFR BLD: 69 % (ref 44–72)
NITRITE UR QL STRIP.AUTO: NEGATIVE
NRBC # BLD AUTO: 0 K/UL
NRBC BLD-RTO: 0 /100 WBC
PH UR STRIP.AUTO: 6 [PH] (ref 5–8)
PLATELET # BLD AUTO: 253 K/UL (ref 164–446)
PMV BLD AUTO: 10.2 FL (ref 9–12.9)
POTASSIUM SERPL-SCNC: 4.1 MMOL/L (ref 3.6–5.5)
PROT SERPL-MCNC: 6.9 G/DL (ref 6–8.2)
PROT UR QL STRIP: NEGATIVE MG/DL
RBC # BLD AUTO: 4.48 M/UL (ref 4.2–5.4)
RBC # URNS HPF: ABNORMAL /HPF
RBC UR QL AUTO: ABNORMAL
SODIUM SERPL-SCNC: 139 MMOL/L (ref 135–145)
SP GR UR STRIP.AUTO: 1
T4 FREE SERPL-MCNC: 1.04 NG/DL (ref 0.93–1.7)
TRIGL SERPL-MCNC: 75 MG/DL (ref 0–149)
TSH SERPL DL<=0.005 MIU/L-ACNC: 2.03 UIU/ML (ref 0.38–5.33)
UROBILINOGEN UR STRIP.AUTO-MCNC: 0.2 MG/DL
WBC # BLD AUTO: 7.1 K/UL (ref 4.8–10.8)
WBC #/AREA URNS HPF: ABNORMAL /HPF

## 2023-01-10 PROCEDURE — 80061 LIPID PANEL: CPT

## 2023-01-10 PROCEDURE — 87077 CULTURE AEROBIC IDENTIFY: CPT

## 2023-01-10 PROCEDURE — 87186 SC STD MICRODIL/AGAR DIL: CPT

## 2023-01-10 PROCEDURE — 87086 URINE CULTURE/COLONY COUNT: CPT

## 2023-01-10 PROCEDURE — 81001 URINALYSIS AUTO W/SCOPE: CPT

## 2023-01-10 PROCEDURE — 85025 COMPLETE CBC W/AUTO DIFF WBC: CPT

## 2023-01-10 PROCEDURE — 84439 ASSAY OF FREE THYROXINE: CPT

## 2023-01-10 PROCEDURE — 80053 COMPREHEN METABOLIC PANEL: CPT

## 2023-01-10 PROCEDURE — 83036 HEMOGLOBIN GLYCOSYLATED A1C: CPT

## 2023-01-10 PROCEDURE — 84443 ASSAY THYROID STIM HORMONE: CPT

## 2023-01-10 PROCEDURE — 82306 VITAMIN D 25 HYDROXY: CPT

## 2023-01-12 ENCOUNTER — TELEPHONE (OUTPATIENT)
Dept: INTERNAL MEDICINE | Facility: IMAGING CENTER | Age: 88
End: 2023-01-12
Payer: MEDICARE

## 2023-01-12 DIAGNOSIS — N39.0 RECURRENT UTI: ICD-10-CM

## 2023-01-12 RX ORDER — CEFDINIR 300 MG/1
300 CAPSULE ORAL 2 TIMES DAILY
Qty: 10 CAPSULE | Refills: 0 | Status: SHIPPED | OUTPATIENT
Start: 2023-01-12 | End: 2023-01-17

## 2023-01-17 ENCOUNTER — OFFICE VISIT (OUTPATIENT)
Dept: INTERNAL MEDICINE | Facility: IMAGING CENTER | Age: 88
End: 2023-01-17
Payer: MEDICARE

## 2023-01-17 VITALS
OXYGEN SATURATION: 98 % | TEMPERATURE: 97.9 F | HEART RATE: 70 BPM | RESPIRATION RATE: 17 BRPM | SYSTOLIC BLOOD PRESSURE: 142 MMHG | BODY MASS INDEX: 19.61 KG/M2 | WEIGHT: 122 LBS | DIASTOLIC BLOOD PRESSURE: 62 MMHG | HEIGHT: 66 IN

## 2023-01-17 DIAGNOSIS — E55.9 VITAMIN D DEFICIENCY: ICD-10-CM

## 2023-01-17 DIAGNOSIS — I10 BENIGN ESSENTIAL HTN: ICD-10-CM

## 2023-01-17 DIAGNOSIS — R11.0 NAUSEA: ICD-10-CM

## 2023-01-17 DIAGNOSIS — Z87.440 HISTORY OF RECURRENT UTIS: Chronic | ICD-10-CM

## 2023-01-17 DIAGNOSIS — R74.01 ELEVATED ALT MEASUREMENT: ICD-10-CM

## 2023-01-17 DIAGNOSIS — R74.8 ELEVATED ALKALINE PHOSPHATASE LEVEL: ICD-10-CM

## 2023-01-17 DIAGNOSIS — R73.09 ELEVATED HEMOGLOBIN A1C: ICD-10-CM

## 2023-01-17 DIAGNOSIS — Z71.85 VACCINE COUNSELING: ICD-10-CM

## 2023-01-17 DIAGNOSIS — E78.2 MIXED DYSLIPIDEMIA: ICD-10-CM

## 2023-01-17 DIAGNOSIS — Z00.00 ENCOUNTER FOR MEDICARE ANNUAL WELLNESS EXAM: ICD-10-CM

## 2023-01-17 PROCEDURE — G0439 PPPS, SUBSEQ VISIT: HCPCS | Performed by: FAMILY MEDICINE

## 2023-01-17 ASSESSMENT — PATIENT HEALTH QUESTIONNAIRE - PHQ9: CLINICAL INTERPRETATION OF PHQ2 SCORE: 0

## 2023-01-17 ASSESSMENT — FIBROSIS 4 INDEX: FIB4 SCORE: 1.93

## 2023-01-17 ASSESSMENT — ENCOUNTER SYMPTOMS: GENERAL WELL-BEING: GOOD

## 2023-01-17 ASSESSMENT — ACTIVITIES OF DAILY LIVING (ADL): BATHING_REQUIRES_ASSISTANCE: 0

## 2023-01-17 NOTE — PROGRESS NOTES
CC:   Medicare Annual Wellness Visit    HPI:  Char is a 89 y.o. female here for her Medicare Annual Wellness Visit and to review labs done 1/01/23. Since having COVID-19 infection at the end of September 2022, she has had five urinary tract infections to date (3 pan sensitive E. Coli, 1 Enterococcus faecalis, 1 Klebsiella pneumoniae). She completed abx use today for Klebsiella UTI and feels much better overall. She has purchased cranberry with d mannose supplementation to take daily and remains independent with all aspects of her life (does not have trouble with hygiene matters). She has history of elevated HgA1c without DM diagnosis, and chronic mixed dyslipidemia with Lipitor compliance. She has chronic essential HTN and endorses compliance with daily Lisinopril use. She also reports experiencing intermittent nausea when she lifts heaving items/ uses her chest muscles extensively - has prior history of costochondritis or pleurisy per her report. She denies associated chest pain, shortness of breath, emesis, and nausea does not occur as long as she avoids triggering activity (does not interfere with her regular exercise routines). She has history of vitamin d deficiency with ongoing supplementation and remains active (uses treadmill/ does tino chi). She denies new mental health concerns, is aware of vaccine eligibility, and is in a very happy mood today.     Patient Active Problem List    Diagnosis Date Noted    History of COVID-19 11/14/2022    Mixed dyslipidemia 12/15/2020    Elevated hemoglobin A1c 12/15/2020    History of recurrent UTIs 01/10/2019    Vitamin D deficiency 09/25/2018    Benign essential HTN 07/13/2017    Systolic murmur 06/29/2017    Osteoporosis 08/24/2016     Current Outpatient Medications   Medication Sig Dispense Refill    Ascorbic Acid (VITAMIN C ADULT GUMMIES PO) Take  by mouth.      CRANBERRY PO Take 2 Tablets by mouth every day. D mannose/ Cranberry gummies      cefdinir (OMNICEF) 300  MG Cap Take 1 Capsule by mouth 2 times a day for 5 days. 10 Capsule 0    lisinopril (PRINIVIL) 10 MG Tab Take 1 Tablet by mouth every day. 90 Tablet 3    atorvastatin (LIPITOR) 20 MG Tab Take 1 Tablet by mouth at bedtime. 90 Tablet 3    Cholecalciferol (VITAMIN D3 GUMMIES PO) Take  by mouth.       No current facility-administered medications for this visit.      Current supplements: see MAR  Chronic narcotic pain medicines: no  Allergies: Soy allergy  Exercise: yes  Current social contact/activities: yes  Current mood: good  Advance Directive on file: no    Screening:  Depression Screening  Little interest or pleasure in doing things?  0 - not at all  Feeling down, depressed , or hopeless? 0 - not at all  Patient Health Questionnaire Score: 0     If depressive symptoms identified deferred to follow up visit unless specifically addressed in assessment and plan.    Interpretation of PHQ-9 Total Score   Score Severity   1-4 No Depression   5-9 Mild Depression   10-14 Moderate Depression   15-19 Moderately Severe Depression   20-27 Severe Depression    Screening for Cognitive Impairment  Three Minute Recall (daughter, heaven, mountain) 3/3    Otto clock face with all 12 numbers and set the hands to show 10 past 11.  Yes    Cognitive concerns identified deferred for follow up unless specifically addressed in assessment and plan.    Fall Risk Assessment  Has the patient had two or more falls in the last year or any fall with injury in the last year?  No    Safety Assessment  Throw rugs on floor.  No  Handrails on all stairs.  Yes  Good lighting in all hallways.  Yes  Difficulty hearing.  No  Patient counseled about all safety risks that were identified.    Functional Assessment ADLs  Are there any barriers preventing you from cooking for yourself or meeting nutritional needs?  No.    Are there any barriers preventing you from driving safely or obtaining transportation?  No.    Are there any barriers preventing you from  using a telephone or calling for help?  No.    Are there any barriers preventing you from shopping?  No.    Are there any barriers preventing you from taking care of your own finances?  No.    Are there any barriers preventing you from managing your medications?  No.    Are there any barriers preventing you from showering, bathing or dressing yourself?  No.    Are you currently engaging in any exercise or physical activity?  Yes.     What is your perception of your health?  Good    Advance Care Planning  Do you have an Advance Directive, Living Will, Durable Power of , or POLST?  yes                 Health Maintenance Summary            Overdue - COVID-19 Vaccine (5 - Booster for Pfizer series) Overdue since 5/30/2022 04/04/2022  Imm Admin: PFIZER GOMEZ CAP SARS-COV-2 VACCINATION (12+)    10/01/2021  Imm Admin: PFIZER PURPLE CAP SARS-COV-2 VACCINATION (12+)    02/11/2021  Imm Admin: PFIZER PURPLE CAP SARS-COV-2 VACCINATION (12+)    01/21/2021  Imm Admin: PFIZER PURPLE CAP SARS-COV-2 VACCINATION (12+)              Annual Wellness Visit (Every 366 Days) Next due on 1/18/2024 01/17/2023  Visit Dx: Encounter for Medicare annual wellness exam    01/17/2023  Subsequent Annual Wellness Visit - Includes PPPS ()    12/26/2021  Subsequent Annual Wellness Visit - Includes PPPS ()    12/22/2021  Visit Dx: Encounter for Medicare annual wellness exam    12/15/2020  Subsequent Annual Wellness Visit - Includes PPPS ()    Only the first 5 history entries have been loaded, but more history exists.              IMM DTaP/Tdap/Td Vaccine (2 - Td or Tdap) Next due on 3/22/2024      03/22/2014  Imm Admin: Tdap Vaccine    06/19/2007  Imm Admin: TD Vaccine    02/26/1997  Imm Admin: TD Vaccine    07/26/1995  Imm Admin: TD Vaccine              IMM HEP B VACCINE (Series Information) Completed      09/10/1997  Imm Admin: Hepatitis B Vaccine (Adol/Adult)    04/09/1997  Imm Admin: Hepatitis B Vaccine (Adol/Adult)     03/05/1997  Imm Admin: Hepatitis B Vaccine (Adol/Adult)              IMM PNEUMOCOCCAL VACCINE: 65+ Years (Series Information) Completed      11/20/2018  Imm Admin: Pneumococcal polysaccharide vaccine (PPSV-23)    03/28/2016  Imm Admin: Pneumococcal Conjugate Vaccine (Prevnar/PCV-13)    10/20/1997  Imm Admin: Pneumococcal polysaccharide vaccine (PPSV-23)              IMM ZOSTER VACCINES (Series Information) Completed      10/16/2019  Imm Admin: Zoster Vaccine Recombinant (RZV) (SHINGRIX)    08/16/2019  Imm Admin: Zoster Vaccine Recombinant (RZV) (SHINGRIX)    09/23/2016  Imm Admin: Zoster Vaccine Live (ZVL) (Zostavax) - HISTORICAL DATA              IMM INFLUENZA (Series Information) Completed      09/19/2022  Imm Admin: Influenza Vaccine Adult HD    09/14/2021  Imm Admin: Influenza Vaccine Adult HD    09/04/2020  Imm Admin: Influenza Vaccine Adult HD    09/24/2019  Imm Admin: Influenza, Unspecified - HISTORICAL DATA    09/29/2018  Imm Admin: Influenza Vaccine Adult HD    Only the first 5 history entries have been loaded, but more history exists.              IMM MENINGOCOCCAL ACWY VACCINE (Series Information) Aged Out      No completion history exists for this topic.              Discontinued - BONE DENSITY  Discontinued      07/12/2017  DS-BONE DENSITY STUDY (DEXA)                    Patient Care Team:  Gisella Jenkins M.D. as PCP - General (Family Medicine)      Social History     Tobacco Use    Smoking status: Never    Smokeless tobacco: Never   Vaping Use    Vaping Use: Never used   Substance Use Topics    Alcohol use: No     Alcohol/week: 0.0 oz    Drug use: No     Family History   Family history unknown: Yes     She  has a past medical history of Allergy, Arrhythmia (2017), Cataract, History of recurrent UTIs (01/10/2019), Hyperlipidemia, and Hypertension.   Past Surgical History:   Procedure Laterality Date    HIP NAILING INTRAMEDULLARY Left 5/30/2017    Procedure: HIP NAILING INTRAMEDULLARY;  Surgeon:  "Zion Pimentel M.D.;  Location: SURGERY [facility];  Service:     OPEN REDUCTION Left 2016    hip fracture    MAMMOPLASTY AUGMENTATION  age 30's       ROS:    All positives noted in HPI. All others reviewed and are negative.    Ostomy or other tubes or amputations: no  Chronic oxygen use: no  Last eye exam: UTD per report  : denies urinary incontinence; does not interfere with ADLs/ sleep  Gait: stable  Problems with balance/ difficulty walking: no  Hearing: adequate  Dentition: adequate    Lab results 1/10/23 reviewed with patient at visit today.     Exam:   BP (!) 142/62 (BP Location: Left arm, Patient Position: Sitting, BP Cuff Size: Adult)   Pulse 70   Temp 36.6 °C (97.9 °F) (Temporal)   Resp 17   Ht 1.676 m (5' 6\")   Wt 55.3 kg (122 lb)   SpO2 98%  Body mass index is 19.69 kg/m².    Gen: Well developed; well nourished; no acute distress; age appropriate appearance   HEENT: Normocephalic; atraumatic; PEERLA b/l; sclera clear b/l; b/l external auditory canals WNL; b/l TM WNL; nares patent; oropharynx clear; mask in place   Neck: No adenopathy; no thyromegaly  CV: Regular rate and rhythm; S1/ S2 present; no murmur, gallop or rub noted  Pulm: No respiratory distress; clear to ascultation b/l; no wheezing or stridor noted b/l  Abd: Adequate bowel sounds noted; soft and nontender; no rebound, rigidity, nor distention  Extremities: No peripheral edema b/l LE extremities/ no clubbing nor cyanosis noted  Skin: Warm and dry; no rashes noted   Neuro: No focal deficits noted; pt is able to get up out of chair unassisted and walk forward  Psych: AAOx4; mood and affect are at her baseline    Assessment and Plan:  1. History of recurrent UTIs  Ongoing issue for patient - refer to HPI for details. Recommend patient provide urine sample for test of cure in 1-2 weeks, and new referral made to [facility] for specialty evaluation and management.   - Referral to Urology  - Subsequent Annual Wellness Visit - Includes " PPPS ()    2. Elevated alkaline phosphatase level  ALk phos level is now 379 (patient has history of mild elevation over the past few years but not to this degree). Considering #3 and #4 issues, recommend patient obtain abdominal ultrasound for further evaluation of condition.   - US-ABDOMEN COMPLETE SURVEY; Future  - Subsequent Annual Wellness Visit - Includes PPPS ()    3. Nausea  Intermittent issue for patient as described above in HPI. Recommend patient proceed with abdominal US and avoidance of activities that cause her to feel nauseous.   - US-ABDOMEN COMPLETE SURVEY; Future  - Subsequent Annual Wellness Visit - Includes PPPS ()    4. Elevated ALT measurement  ALT is now 51 and coupled with elevated alk phos level, recommend abdominal US for further evaluation.   - US-ABDOMEN COMPLETE SURVEY; Future  - Subsequent Annual Wellness Visit - Includes PPPS ()    5. Benign essential HTN  Marked improvement with blood pressure management. She endorses compliance with daily Lisinopril use and does not tolerate tight BP control well.   - Subsequent Annual Wellness Visit - Includes PPPS ()    6. Elevated hemoglobin A1c  Stable/ HgA1c remains at 5.8% and patient remains well informed about strategies to lower blood glucose levels moving forward.   - Subsequent Annual Wellness Visit - Includes PPPS ()    7. Mixed dyslipidemia  Marked improvement in lipid management since patient started nightly Lipitor use. Recommend patient continue nightly Lipitor use, healthy diet choices, and regular exercise.   - Subsequent Annual Wellness Visit - Includes PPPS ()    8. Vitamin D deficiency  Stable/ recommend patient continue current Vitamin D use for maintenance.   - Subsequent Annual Wellness Visit - Includes PPPS ()    9. Vaccine counseling  Patient is aware of vaccine eligibility.   - Subsequent Annual Wellness Visit - Includes PPPS ()    10. Encounter for Medicare annual wellness  exam  Patient remains independent in all aspects of her life and remains well informed about chronic medical conditions that need additional attention moving forward.   - Subsequent Annual Wellness Visit - Includes PPPS ()     Services needed: no new services needed at this time  Health Care Screening: recommendations as per orders if indicated.  Referrals offered: Urology   Counseling provided today:  Prevent falls and reduce trip hazards; Secure or remove rugs if present   Maintain working fire alarm and carbon monoxide detectors   Engage in regular physical activity daily and social activities weekly as tolerated

## 2023-03-20 ENCOUNTER — HOSPITAL ENCOUNTER (OUTPATIENT)
Facility: MEDICAL CENTER | Age: 88
End: 2023-03-20
Attending: FAMILY MEDICINE
Payer: MEDICARE

## 2023-03-20 ENCOUNTER — NON-PROVIDER VISIT (OUTPATIENT)
Dept: INTERNAL MEDICINE | Facility: IMAGING CENTER | Age: 88
End: 2023-03-20
Payer: MEDICARE

## 2023-03-20 DIAGNOSIS — R82.90 ABNORMAL URINALYSIS: ICD-10-CM

## 2023-03-20 DIAGNOSIS — R30.0 DYSURIA: ICD-10-CM

## 2023-03-20 LAB
APPEARANCE UR: CLEAR
BILIRUB UR STRIP-MCNC: NEGATIVE MG/DL
COLOR UR AUTO: YELLOW
GLUCOSE UR STRIP.AUTO-MCNC: NEGATIVE MG/DL
KETONES UR STRIP.AUTO-MCNC: NEGATIVE MG/DL
LEUKOCYTE ESTERASE UR QL STRIP.AUTO: NORMAL
NITRITE UR QL STRIP.AUTO: NEGATIVE
PH UR STRIP.AUTO: 6 [PH] (ref 5–8)
PROT UR QL STRIP: NEGATIVE MG/DL
RBC UR QL AUTO: NORMAL
SP GR UR STRIP.AUTO: 1.01
UROBILINOGEN UR STRIP-MCNC: 0.2 MG/DL

## 2023-03-20 PROCEDURE — 87086 URINE CULTURE/COLONY COUNT: CPT

## 2023-03-20 PROCEDURE — 81002 URINALYSIS NONAUTO W/O SCOPE: CPT | Performed by: FAMILY MEDICINE

## 2023-03-20 PROCEDURE — 87077 CULTURE AEROBIC IDENTIFY: CPT

## 2023-03-23 DIAGNOSIS — A42.9 ACTINOMYCES INFECTION: ICD-10-CM

## 2023-03-23 RX ORDER — PENICILLIN V POTASSIUM 500 MG/1
500 TABLET ORAL 4 TIMES DAILY
Qty: 28 TABLET | Refills: 0 | Status: SHIPPED | OUTPATIENT
Start: 2023-03-23 | End: 2023-03-30

## 2023-04-10 ENCOUNTER — HOSPITAL ENCOUNTER (OUTPATIENT)
Dept: RADIOLOGY | Facility: MEDICAL CENTER | Age: 88
End: 2023-04-10
Attending: FAMILY MEDICINE
Payer: MEDICARE

## 2023-04-10 ENCOUNTER — TELEPHONE (OUTPATIENT)
Dept: INTERNAL MEDICINE | Facility: IMAGING CENTER | Age: 88
End: 2023-04-10
Payer: MEDICARE

## 2023-04-10 DIAGNOSIS — R74.8 ELEVATED ALKALINE PHOSPHATASE LEVEL: ICD-10-CM

## 2023-04-10 DIAGNOSIS — R11.0 NAUSEA: ICD-10-CM

## 2023-04-10 DIAGNOSIS — R74.01 ELEVATED ALT MEASUREMENT: ICD-10-CM

## 2023-04-10 DIAGNOSIS — N28.89 RENAL MASS: ICD-10-CM

## 2023-04-10 DIAGNOSIS — E78.2 MIXED DYSLIPIDEMIA: ICD-10-CM

## 2023-04-10 PROCEDURE — 76700 US EXAM ABDOM COMPLETE: CPT

## 2023-04-10 RX ORDER — ATORVASTATIN CALCIUM 20 MG/1
20 TABLET, FILM COATED ORAL
Qty: 90 TABLET | Refills: 3 | Status: SHIPPED | OUTPATIENT
Start: 2023-04-10 | End: 2024-03-11 | Stop reason: SDUPTHER

## 2023-04-10 NOTE — TELEPHONE ENCOUNTER
Reviewed ultrasound results with patient and recommend proceeding with further imaging. Patient will come into office tomorrow for lab draw required for contrast exam, and I will then order CT scan accordingly. All questions answered as able.

## 2023-04-11 ENCOUNTER — HOSPITAL ENCOUNTER (OUTPATIENT)
Facility: MEDICAL CENTER | Age: 88
End: 2023-04-11
Attending: FAMILY MEDICINE
Payer: MEDICARE

## 2023-04-11 ENCOUNTER — NON-PROVIDER VISIT (OUTPATIENT)
Dept: INTERNAL MEDICINE | Facility: IMAGING CENTER | Age: 88
End: 2023-04-11
Payer: MEDICARE

## 2023-04-11 DIAGNOSIS — N28.89 OTHER SPECIFIED DISORDERS OF KIDNEY AND URETER: ICD-10-CM

## 2023-04-11 DIAGNOSIS — N28.89 RIGHT RENAL MASS: ICD-10-CM

## 2023-04-11 DIAGNOSIS — N28.89 RENAL MASS: ICD-10-CM

## 2023-04-11 LAB
ALBUMIN SERPL BCP-MCNC: 4.3 G/DL (ref 3.2–4.9)
BUN SERPL-MCNC: 26 MG/DL (ref 8–22)
CALCIUM ALBUM COR SERPL-MCNC: 9 MG/DL (ref 8.5–10.5)
CALCIUM SERPL-MCNC: 9.2 MG/DL (ref 8.5–10.5)
CHLORIDE SERPL-SCNC: 106 MMOL/L (ref 96–112)
CO2 SERPL-SCNC: 25 MMOL/L (ref 20–33)
CREAT SERPL-MCNC: 0.77 MG/DL (ref 0.5–1.4)
GFR SERPLBLD CREATININE-BSD FMLA CKD-EPI: 72 ML/MIN/1.73 M 2
GLUCOSE SERPL-MCNC: 102 MG/DL (ref 65–99)
PHOSPHATE SERPL-MCNC: 3.7 MG/DL (ref 2.5–4.5)
POTASSIUM SERPL-SCNC: 4.3 MMOL/L (ref 3.6–5.5)
SODIUM SERPL-SCNC: 141 MMOL/L (ref 135–145)

## 2023-04-11 PROCEDURE — 80069 RENAL FUNCTION PANEL: CPT

## 2023-04-13 ENCOUNTER — HOSPITAL ENCOUNTER (OUTPATIENT)
Dept: RADIOLOGY | Facility: MEDICAL CENTER | Age: 88
End: 2023-04-13
Attending: FAMILY MEDICINE
Payer: MEDICARE

## 2023-04-13 DIAGNOSIS — N28.89 OTHER SPECIFIED DISORDERS OF KIDNEY AND URETER: ICD-10-CM

## 2023-04-13 DIAGNOSIS — N28.89 RIGHT RENAL MASS: ICD-10-CM

## 2023-04-13 PROCEDURE — 74170 CT ABD WO CNTRST FLWD CNTRST: CPT

## 2023-04-13 PROCEDURE — 700117 HCHG RX CONTRAST REV CODE 255: Performed by: FAMILY MEDICINE

## 2023-04-13 RX ADMIN — IOHEXOL 100 ML: 350 INJECTION, SOLUTION INTRAVENOUS at 12:45

## 2023-04-14 ENCOUNTER — OFFICE VISIT (OUTPATIENT)
Dept: INTERNAL MEDICINE | Facility: IMAGING CENTER | Age: 88
End: 2023-04-14
Payer: MEDICARE

## 2023-04-14 VITALS
SYSTOLIC BLOOD PRESSURE: 142 MMHG | RESPIRATION RATE: 17 BRPM | OXYGEN SATURATION: 97 % | TEMPERATURE: 98.5 F | DIASTOLIC BLOOD PRESSURE: 76 MMHG | HEIGHT: 66 IN | WEIGHT: 121.91 LBS | BODY MASS INDEX: 19.59 KG/M2 | HEART RATE: 65 BPM

## 2023-04-14 DIAGNOSIS — N28.89 RIGHT RENAL MASS: ICD-10-CM

## 2023-04-14 DIAGNOSIS — R74.8 ELEVATED ALKALINE PHOSPHATASE LEVEL: ICD-10-CM

## 2023-04-14 PROCEDURE — 99214 OFFICE O/P EST MOD 30 MIN: CPT | Performed by: FAMILY MEDICINE

## 2023-04-14 ASSESSMENT — FIBROSIS 4 INDEX: FIB4 SCORE: 1.96

## 2023-04-24 ENCOUNTER — HOSPITAL ENCOUNTER (OUTPATIENT)
Facility: MEDICAL CENTER | Age: 88
End: 2023-04-24
Attending: UROLOGY
Payer: MEDICARE

## 2023-04-24 PROCEDURE — 87086 URINE CULTURE/COLONY COUNT: CPT

## 2023-04-24 PROCEDURE — 87186 SC STD MICRODIL/AGAR DIL: CPT

## 2023-04-24 PROCEDURE — 87077 CULTURE AEROBIC IDENTIFY: CPT

## 2023-09-05 DIAGNOSIS — I10 BENIGN ESSENTIAL HTN: ICD-10-CM

## 2023-09-05 RX ORDER — LISINOPRIL 10 MG/1
10 TABLET ORAL DAILY
Qty: 90 TABLET | Refills: 1 | Status: SHIPPED | OUTPATIENT
Start: 2023-09-05 | End: 2024-02-26 | Stop reason: SDUPTHER

## 2023-09-28 ENCOUNTER — HOSPITAL ENCOUNTER (OUTPATIENT)
Facility: MEDICAL CENTER | Age: 88
End: 2023-09-28
Attending: STUDENT IN AN ORGANIZED HEALTH CARE EDUCATION/TRAINING PROGRAM
Payer: MEDICARE

## 2023-09-28 PROCEDURE — 87186 SC STD MICRODIL/AGAR DIL: CPT

## 2023-09-28 PROCEDURE — 87086 URINE CULTURE/COLONY COUNT: CPT

## 2023-09-28 PROCEDURE — 87077 CULTURE AEROBIC IDENTIFY: CPT

## 2023-11-24 NOTE — MR AVS SNAPSHOT
Char Rockwell   2017 11:30 AM   Office Visit   MRN: [id number]    Department:  [facility]   Dept Phone:  [phone]    Description:  Female : 3/1/1930   Provider:  JAN Montez           Reason for Visit     Cough           Allergies as of 2017     No Known Allergies      You were diagnosed with     Nausea   [884508]         Vital Signs     Blood Pressure Pulse Temperature Respirations Oxygen Saturation Smoking Status    120/80 mmHg 78 36.4 °C (97.6 °F) 20 95% Never Smoker       Basic Information     Date Of Birth Sex Race Ethnicity Preferred Language    3/1/1930 Female Unknown Non- English      Problem List              ICD-10-CM Priority Class Noted - Resolved    Osteoporosis M81.0   2016 - Present    Elevated blood pressure I10   2016 - Present    Preventative health care Z00.00   2016 - Present    Hyperlipidemia LDL goal <100 E78.5   2016 - Present    Cataracts, bilateral H26.9   2016 - Present      Health Maintenance        Date Due Completion Dates    IMM DTaP/Tdap/Td Vaccine (1 - Tdap) 3/1/1949 ---    PAP SMEAR 3/1/1951 ---    MAMMOGRAM 3/1/1970 ---    COLONOSCOPY 3/1/1980 ---    IMM ZOSTER VACCINE 3/1/1990 ---    BONE DENSITY 3/1/1995 ---    IMM PNEUMOCOCCAL 65+ (ADULT) LOW/MEDIUM RISK SERIES (1 of 2 - PCV13) 3/1/1995 ---    IMM INFLUENZA (1) 2016 ---            Current Immunizations     No immunizations on file.      Below and/or attached are the medications your provider expects you to take. Review all of your home medications and newly ordered medications with your provider and/or pharmacist. Follow medication instructions as directed by your provider and/or pharmacist. Please keep your medication list with you and share with your provider. Update the information when medications are discontinued, doses are changed, or new medications (including over-the-counter products) are added; and carry medication information at  PC:  Leg pain and swelling    [place]:No acute events overnight      Patient reports improvement in breathing overall, remains on 3-5L NC. At times requires 8L, intermittent use of CPAP.  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    Sleep apnea     Subcutaneous fat necrosis 1968    9 months    Verruca plantaris        Past Surgical History:   Procedure Laterality Date    Breast biopsy Right 03/15/2011    Breast surgery Right 2013    Two surgeries to remove and drain \"cyst\"     section, low transverse      x2, 1997,     Cholecystectomy  2013    [facility]    Colonoscopy  2014    Hysterectomy  2009    Knee arthroscopy w/ meniscectomy Left 2010    Meniscectomy Right     [facility]    Nail removal Bilateral     [facility] [place]    Ovarian cyst removal  2003       Family History   Problem Relation Age of Onset    Hypertension Father     Cancer Father         skin    COPD Mother     Heart disease Mother         heart failure    Lung Disease Mother         asthma/allergy    Cataracts Mother     Glaucoma Mother     Cancer Sister 42        ovarian    Heart disease Maternal Grandmother     Heart disease Maternal Grandfather     Psychiatric Son        Social History     Socioeconomic History    Marital status: /Civil Union     Spouse name: Not on file    Number of children: Not on file    Years of education: Not on file    Highest education level: Not on file   Occupational History    Not on file   Tobacco Use     Smoking status: Every Day     Current packs/day: 1.00     Average packs/day: 1 pack/day for 40.9 years (40.9 ttl pk-yrs)     Types: Cigarettes     Start date: 1983    Smokeless tobacco: Never   Vaping Use    Vaping Use: never used   Substance and Sexual Activity    Alcohol use: No    Drug use: Never    Sexual activity: Not on file   Other Topics Concern    Not on file   Social History Narrative    Not on file     Social Determinants of Health     Financial Resource Strain: Low Risk  (11/19/2023)    Financial Resource Strain     Social Determinants: Financial Resource Strain: None   Food Insecurity: Food Insecurity Present (11/19/2023)    Food Insecurity     Social Determinants: Food Insecurity: Always   Transportation Needs: No Transportation Needs (11/19/2023)    PRAPARE - Transportation     Lack of Transportation (Medical): No     Lack of Transportation (Non-Medical): No   Physical Activity: Not on file   Stress: Not on file   Social Connections: Socially Integrated (11/19/2023)    Social Connections     Social Determinants: Social Connections: 5 or more times a week   Intimate Partner Violence: Not At Risk (11/19/2023)    Intimate Partner Violence     Social Determinants: Intimate Partner Violence Past Fear: No     Social Determinants: Intimate Partner Violence Current Fear: No       Eye Problem(s):negative  ENT Problem(s):negative  Cardiovascular problem(s):as above  Respiratory problem(s):as above  Gastro-intestinal problem(s):negative GI  Genito-urinary problem(s):negative  Musculoskeletal problem(s):negative  Integumentary problem(s):negative  Neurological problem(s):negative  Psychiatric problem(s):negative  Endocrine problem(s):negative  Hematologic and/or Lymphatic problem(s):negative    Current Facility-Administered Medications   Medication    piperacillin-tazobactam (ZOSYN) 4.5 g in sodium chloride 0.9 % 100 mL IVPB    umeclidinium-vilanterol (ANORO ELLIPTA) 62.5-25 MCG/ACT inhaler 1 puff    apixaBAN  all times in the event of emergency situations     Allergies:  No Known Allergies          Medications  Valid as of: February 14, 2017 - 12:45 PM    Generic Name Brand Name Tablet Size Instructions for use    Benzonatate (Cap) TESSALON 200 MG Take 1 Cap by mouth 3 times a day as needed for Cough for up to 7 days.        Ondansetron (TABLET DISPERSIBLE) ZOFRAN ODT 4 MG Take 1 Tab by mouth every 8 hours as needed for Nausea/Vomiting.        Promethazine-Codeine (Syrup) PHENERGAN-CODEINE 6.25-10 MG/5ML Take 5 mL by mouth 4 times a day as needed for up to 14 days.        Sulfamethoxazole-Trimethoprim (Tab) BACTRIM -160 MG Take 1 Tab by mouth 2 times a day for 10 days.        .                 Medicines prescribed today were sent to:     [facility] #[id number] - [place], [place] - [address]    [address] [place] [address]    Phone: [phone] Fax: [phone]    Open 24 Hours?: No      Medication refill instructions:       If your prescription bottle indicates you have medication refills left, it is not necessary to call your provider’s office. Please contact your pharmacy and they will refill your medication.    If your prescription bottle indicates you do not have any refills left, you may request refills at any time through one of the following ways: The online [employer] system (except Urgent Care), by calling your provider’s office, or by asking your pharmacy to contact your provider’s office with a refill request. Medication refills are processed only during regular business hours and may not be available until the next business day. Your provider may request additional information or to have a follow-up visit with you prior to refilling your medication.   *Please Note: Medication refills are assigned a new Rx number when refilled electronically. Your pharmacy may indicate that no refills were authorized even though a new prescription for the same medication is available at the pharmacy. Please request  (ELIQUIS) tablet 10 mg    furosemide (LASIX INJECT) injection 40 mg    Potassium Replacement (Levels 3.6 - 4)    Potassium Standard Replacement Protocol (Levels 3.5 and lower)    Magnesium Standard Replacement Protocol    Phosphorus Standard Replacement Protocol    ondansetron (ZOFRAN) injection 4 mg    acetaminophen (TYLENOL) tablet 650 mg    polyethylene glycol (MIRALAX) packet 17 g    docusate sodium-sennosides (SENOKOT S) 50-8.6 MG 2 tablet    bisacodyl (DULCOLAX) suppository 10 mg    magnesium hydroxide (MILK OF MAGNESIA) 400 MG/5ML suspension 30 mL    aluminum-magnesium hydroxide-simethicone (MAALOX) 200-200-20 MG/5ML suspension 30 mL    hydrALAZINE (APRESOLINE) injection 10 mg    nystatin (MYCOSTATIN) powder    nicotine (NICODERM) 21 MG/24HR patch 1 patch    ipratropium-albuterol (DUONEB) 0.5-2.5 (3) MG/3ML nebulizer solution 3 mL    guaiFENesin-codeine (GUAIFENESIN AC) 100-10 MG/5ML liquid 10 mL    guaiFENesin (MUCINEX) ER tablet 600 mg    sodium chloride 0.9 % flush bag 25 mL    sodium chloride 0.9 % injection 2 mL    sodium chloride (NORMAL SALINE) 0.9 % bolus 500 mL    melatonin tablet 9 mg    HYDROcodone-acetaminophen (NORCO) 5-325 MG per tablet 1 tablet    morphine injection 4 mg       O/E:  Visit Vitals  BP (!) 145/72 (BP Location: LFA - Left forearm, Patient Position: Semi-Diego's)   Pulse 96   Temp 97 °F (36.1 °C) (Tympanic)   Resp (!) 28   Ht 5' 4\" (1.626 m)   Wt (!) 144.6 kg (318 lb 12.6 oz)   SpO2 93%   BMI 54.72 kg/m²       Examination of the patient reveals:     GENERAL: alert, is in no apparent distress and is well developed and well nourished  LYMPH NODES: no cervical adenopathy, no supraclavicular adenopathy, no axillary adenopathy and no inguinal adenopathy  SKIN normal color, normal texture, normal turgor, no skin rashes, no atypical appearing skin lesions and no bruises  HEAD: normocephalic  EYES: pupils are equal and reactive to light and accomodation extraocular movements are full  the medicine by name with the pharmacy before contacting your provider for a refill.           [employer] Access Code: [email or web address]  Expires: 3/9/2017 11:03 AM    [employer]  A secure, online tool to manage your health information     [employer]’s [employer]® is a secure, online tool that connects you to your personalized health information from the privacy of your home -- day or night - making it very easy for you to manage your healthcare. Once the activation process is completed, you can even access your medical information using the F[employer] shu, which is available for free in the Apple Shu store or Google Play store.     [employer] provides the following levels of access (as shown below):   My Chart Features   [facility] Doctor [facility]  Specialists [facility]  Urgent  Care [facility]  Primary Care  Doctor   Email your healthcare team securely and privately 24/7 X X X    Manage appointments: schedule your next appointment; view details of past/upcoming appointments X      Request prescription refills. X      View recent personal medical records, including lab and immunizations X X X X   View health record, including health history, allergies, medications X X X X   Read reports about your outpatient visits, procedures, consult and ER notes X X X X   See your discharge summary, which is a recap of your hospital and/or ER visit that includes your diagnosis, lab results, and care plan. X X       How to register for [employer]:  1. Go to  [email or web address].  2. Click on the Sign Up Now box, which takes you to the New Member Sign Up page. You will need to provide the following information:  a. Enter your F[employer] Access Code exactly as it appears at the top of this page. (You will not need to use this code after you’ve completed the sign-up process. If you do not sign up before the expiration date, you must request a new code.)   b. Enter your date of birth.   c. Enter your home email address.   d. Click Submit,  and follow the next screen’s instructions.  3. Create a [employer] ID. This will be your [employer] login ID and cannot be changed, so think of one that is secure and easy to remember.  4. Create a [employer] password. You can change your password at any time.  5. Enter your Password Reset Question and Answer. This can be used at a later time if you forget your password.   6. Enter your e-mail address. This allows you to receive e-mail notifications when new information is available in [employer].  7. Click Sign Up. You can now view your health information.    For assistance activating your [employer] account, call [phone]         sclera and conjunctiva are normal lids and lashes are normal  EARS: pinna and external ear is normal bilaterally, external auditory canals are normal and auditory acuity is grossly normal  NOSE: external nose is normal to inspection and no septal deviation  MOUTH/THROAT: tongue is midline and appears normal, oropharynx appears normal, soft palate and uvula are normal and oral mucosa is normal  NECK: neck is supple, no thyromegaly, no anterior cervical adenopathy, no posterior cervical adenopathy and no supraclavicular adenopathy  CHEST: contour is normal with normal AP diameter, normal respiratory excursion and respiratory effort is not labored  LUNGS:  Decreased air entry bilaterally, no wheezing  HEART: normal PMI, normal rate and rhythm, no palpable heaves or thrills, S1 and S2 normal, no S3 or S4, no murmurs and no extra heart sounds  ABDOMEN: abdomen is soft, normal active bowel sounds, nontender, without masses, without hepatomegaly, without splenomegaly and no pulsatile masses  BACK: inspection shows no curvature, no discomfort to palpation of the midline and no costovertebral angle discomfort to palpation  NEUROLOGIC: cranial nerves 2 through 12 normal, motor strength normal, gait and station normal, coordination normal, DTR's normal and symmetric and no tremor noted  EXTREMITIES: no clubbing, no cyanosis and no edema    WBC (K/mcL)   Date Value   11/24/2023 30.1 (H)     RBC (mil/mcL)   Date Value   11/24/2023 4.92     HCT (%)   Date Value   11/24/2023 46.9 (H)     HGB (g/dL)   Date Value   11/24/2023 15.0     PLT (K/mcL)   Date Value   11/24/2023 270       Sodium (mmol/L)   Date Value   11/24/2023 140     Potassium (mmol/L)   Date Value   11/24/2023 3.7   11/24/2023 4.0     Chloride (mmol/L)   Date Value   11/24/2023 100     Glucose (mg/dL)   Date Value   11/24/2023 96     Calcium (mg/dL)   Date Value   11/24/2023 8.7     Carbon Dioxide (mmol/L)   Date Value   11/24/2023 33 (H)     BUN (mg/dL)   Date Value    11/24/2023 24 (H)     Creatinine (mg/dL)   Date Value   11/24/2023 0.73     CT ANGIOGRAM OF THE CHEST     HISTORY: Tachycardia. Leg swelling. Wheezing.     COMPARISON: CT scan performed 11/7/2019     Following the administration of 150 mL of Omnipaque 350 multiple axial  images were obtained from the thoracic inlet through the upper abdomen.  Sagittal and coronal reformatted images were reviewed. Axial and coronal  MIP images of the chest were reviewed. The study is limited by beam  attenuation due to the patient's body habitus.     Cardiomegaly is present.     There is calcification in the thoracic aorta. There is a small filling  defect in a right upper lobe segmental arterial branch concerning for a  pulmonary embolism. There are no additional filling defects identified in  the pulmonary arterial system to suggest additional pulmonary emboli.     There are nonenlarged anterior mediastinal and pretracheal lymph nodes.  There is a 1 cm right hilar lymph node.     There are increased markings in the right and left lower lobes compatible  with small infiltrates.     There are no pulmonary parenchymal nodules or masses.     The liver and spleen appear prominent.     There is moderate degenerative change in the spine.        IMPRESSION: Filling defect in a right upper lobe segmental pulmonary  arterial branch concerning for a pulmonary embolism.     Small infiltrates right and left lower lobe.        Note: These results were discussed with Preston Damon RN at 10:35 a.m. on  11/21/2023     Electronically Signed by: Jaime Nelson MD  Signed on: 11/21/2023 10:37 AM  Created on Workstation ID: [id number]  Signed on Workstation ID: [id number]    Labs and imaging have been reviewed by me personally.      Assessment/Plan:  1. Sepsis/left leg cellulitis  2. Acute hypoxic respiratory failure  3. Pulmonary embolism  4. Active tobacco use  5. History of PAT    - patient with worsening O2 need, underwent CT chest which  demonstrated filling defect in the right upper lobe segmental artery, patient has been started on Eliquis.  - patient with active tobacco use, likely has component of COPD and will PFTs further evaluation as an outpatient.  - suspect sinus tach due to underlying pulmonary embolism.  - patient currently on clindamycin and cefazolin for left leg cellulitis.   - will start the patient on maintenance inhaler Anoro Ellipta.  -  patient with a history of PAT, was seen by sleep specialist at [facility], patient agreeable for CPAP and this will be ordered once patient respiratory status improves.    Thank you for involving us in the patient care. Please do not hesitate to contact with any questions.     Note is dictated utilizing voice recognition software. This may lead to unintentional typographical errors.     Sadie Cruz MD

## 2023-11-29 ENCOUNTER — PATIENT MESSAGE (OUTPATIENT)
Dept: HEALTH INFORMATION MANAGEMENT | Facility: OTHER | Age: 88
End: 2023-11-29

## 2024-02-26 DIAGNOSIS — I10 BENIGN ESSENTIAL HTN: ICD-10-CM

## 2024-02-26 RX ORDER — LISINOPRIL 10 MG/1
10 TABLET ORAL DAILY
Qty: 90 TABLET | Refills: 0 | Status: SHIPPED | OUTPATIENT
Start: 2024-02-26

## 2024-03-11 DIAGNOSIS — E78.2 MIXED DYSLIPIDEMIA: ICD-10-CM

## 2024-03-11 RX ORDER — ATORVASTATIN CALCIUM 20 MG/1
20 TABLET, FILM COATED ORAL
Qty: 90 TABLET | Refills: 0 | Status: SHIPPED | OUTPATIENT
Start: 2024-03-11

## 2024-04-21 DIAGNOSIS — E78.2 MIXED DYSLIPIDEMIA: ICD-10-CM

## 2024-04-21 DIAGNOSIS — R73.9 HYPERGLYCEMIA: ICD-10-CM

## 2024-04-21 DIAGNOSIS — R53.83 OTHER FATIGUE: ICD-10-CM

## 2024-04-21 DIAGNOSIS — E55.9 VITAMIN D DEFICIENCY: ICD-10-CM

## 2024-04-21 DIAGNOSIS — I10 BENIGN ESSENTIAL HTN: ICD-10-CM

## 2024-04-23 ENCOUNTER — HOSPITAL ENCOUNTER (OUTPATIENT)
Facility: MEDICAL CENTER | Age: 89
End: 2024-04-23
Attending: FAMILY MEDICINE
Payer: MEDICARE

## 2024-04-23 ENCOUNTER — NON-PROVIDER VISIT (OUTPATIENT)
Dept: INTERNAL MEDICINE | Facility: IMAGING CENTER | Age: 89
End: 2024-04-23
Payer: MEDICARE

## 2024-04-23 DIAGNOSIS — R73.9 HYPERGLYCEMIA: ICD-10-CM

## 2024-04-23 DIAGNOSIS — E78.2 MIXED DYSLIPIDEMIA: ICD-10-CM

## 2024-04-23 DIAGNOSIS — E55.9 VITAMIN D DEFICIENCY: ICD-10-CM

## 2024-04-23 DIAGNOSIS — R53.83 OTHER FATIGUE: ICD-10-CM

## 2024-04-23 DIAGNOSIS — I10 BENIGN ESSENTIAL HTN: ICD-10-CM

## 2024-04-23 LAB
25(OH)D3 SERPL-MCNC: 48 NG/ML (ref 30–100)
ALBUMIN SERPL BCP-MCNC: 4.4 G/DL (ref 3.2–4.9)
ALBUMIN/GLOB SERPL: 1.7 G/DL
ALP SERPL-CCNC: 165 U/L (ref 30–99)
ALT SERPL-CCNC: 11 U/L (ref 2–50)
ANION GAP SERPL CALC-SCNC: 12 MMOL/L (ref 7–16)
AST SERPL-CCNC: 18 U/L (ref 12–45)
BASOPHILS # BLD AUTO: 0.9 % (ref 0–1.8)
BASOPHILS # BLD: 0.05 K/UL (ref 0–0.12)
BILIRUB SERPL-MCNC: 0.6 MG/DL (ref 0.1–1.5)
BUN SERPL-MCNC: 14 MG/DL (ref 8–22)
CALCIUM ALBUM COR SERPL-MCNC: 8.9 MG/DL (ref 8.5–10.5)
CALCIUM SERPL-MCNC: 9.2 MG/DL (ref 8.5–10.5)
CHLORIDE SERPL-SCNC: 107 MMOL/L (ref 96–112)
CHOLEST SERPL-MCNC: 171 MG/DL (ref 100–199)
CO2 SERPL-SCNC: 25 MMOL/L (ref 20–33)
CREAT SERPL-MCNC: 0.77 MG/DL (ref 0.5–1.4)
EOSINOPHIL # BLD AUTO: 0.31 K/UL (ref 0–0.51)
EOSINOPHIL NFR BLD: 5.4 % (ref 0–6.9)
ERYTHROCYTE [DISTWIDTH] IN BLOOD BY AUTOMATED COUNT: 43.5 FL (ref 35.9–50)
EST. AVERAGE GLUCOSE BLD GHB EST-MCNC: 128 MG/DL
GFR SERPLBLD CREATININE-BSD FMLA CKD-EPI: 71 ML/MIN/1.73 M 2
GLOBULIN SER CALC-MCNC: 2.6 G/DL (ref 1.9–3.5)
GLUCOSE SERPL-MCNC: 105 MG/DL (ref 65–99)
HBA1C MFR BLD: 6.1 % (ref 4–5.6)
HCT VFR BLD AUTO: 44.8 % (ref 37–47)
HDLC SERPL-MCNC: 72 MG/DL
HGB BLD-MCNC: 14.9 G/DL (ref 12–16)
IMM GRANULOCYTES # BLD AUTO: 0.01 K/UL (ref 0–0.11)
IMM GRANULOCYTES NFR BLD AUTO: 0.2 % (ref 0–0.9)
LDLC SERPL CALC-MCNC: 83 MG/DL
LYMPHOCYTES # BLD AUTO: 2.06 K/UL (ref 1–4.8)
LYMPHOCYTES NFR BLD: 35.6 % (ref 22–41)
MCH RBC QN AUTO: 30.5 PG (ref 27–33)
MCHC RBC AUTO-ENTMCNC: 33.3 G/DL (ref 32.2–35.5)
MCV RBC AUTO: 91.6 FL (ref 81.4–97.8)
MONOCYTES # BLD AUTO: 0.48 K/UL (ref 0–0.85)
MONOCYTES NFR BLD AUTO: 8.3 % (ref 0–13.4)
NEUTROPHILS # BLD AUTO: 2.87 K/UL (ref 1.82–7.42)
NEUTROPHILS NFR BLD: 49.6 % (ref 44–72)
NRBC # BLD AUTO: 0 K/UL
NRBC BLD-RTO: 0 /100 WBC (ref 0–0.2)
PLATELET # BLD AUTO: 223 K/UL (ref 164–446)
PMV BLD AUTO: 10.3 FL (ref 9–12.9)
POTASSIUM SERPL-SCNC: 3.7 MMOL/L (ref 3.6–5.5)
PROT SERPL-MCNC: 7 G/DL (ref 6–8.2)
RBC # BLD AUTO: 4.89 M/UL (ref 4.2–5.4)
SODIUM SERPL-SCNC: 144 MMOL/L (ref 135–145)
T4 FREE SERPL-MCNC: 1 NG/DL (ref 0.93–1.7)
TRIGL SERPL-MCNC: 82 MG/DL (ref 0–149)
TSH SERPL DL<=0.005 MIU/L-ACNC: 2.47 UIU/ML (ref 0.38–5.33)
WBC # BLD AUTO: 5.8 K/UL (ref 4.8–10.8)

## 2024-04-23 PROCEDURE — 84439 ASSAY OF FREE THYROXINE: CPT

## 2024-04-23 PROCEDURE — 84443 ASSAY THYROID STIM HORMONE: CPT

## 2024-04-23 PROCEDURE — 85025 COMPLETE CBC W/AUTO DIFF WBC: CPT

## 2024-04-23 PROCEDURE — 80053 COMPREHEN METABOLIC PANEL: CPT

## 2024-04-23 PROCEDURE — 80061 LIPID PANEL: CPT

## 2024-04-23 PROCEDURE — 99999 PR NO CHARGE: CPT

## 2024-04-23 PROCEDURE — 83036 HEMOGLOBIN GLYCOSYLATED A1C: CPT

## 2024-04-23 PROCEDURE — 82306 VITAMIN D 25 HYDROXY: CPT

## 2024-04-30 ENCOUNTER — OFFICE VISIT (OUTPATIENT)
Dept: INTERNAL MEDICINE | Facility: IMAGING CENTER | Age: 89
End: 2024-04-30
Payer: MEDICARE

## 2024-04-30 ENCOUNTER — HOSPITAL ENCOUNTER (OUTPATIENT)
Facility: MEDICAL CENTER | Age: 89
End: 2024-04-30
Attending: FAMILY MEDICINE
Payer: MEDICARE

## 2024-04-30 VITALS
RESPIRATION RATE: 17 BRPM | WEIGHT: 126.8 LBS | BODY MASS INDEX: 20.38 KG/M2 | TEMPERATURE: 97.9 F | HEIGHT: 66 IN | SYSTOLIC BLOOD PRESSURE: 128 MMHG | OXYGEN SATURATION: 97 % | HEART RATE: 71 BPM | DIASTOLIC BLOOD PRESSURE: 68 MMHG

## 2024-04-30 DIAGNOSIS — M81.0 AGE-RELATED OSTEOPOROSIS WITHOUT CURRENT PATHOLOGICAL FRACTURE: ICD-10-CM

## 2024-04-30 DIAGNOSIS — R73.09 ELEVATED HEMOGLOBIN A1C: ICD-10-CM

## 2024-04-30 DIAGNOSIS — N28.89 RIGHT RENAL MASS: ICD-10-CM

## 2024-04-30 DIAGNOSIS — R30.0 DYSURIA: ICD-10-CM

## 2024-04-30 DIAGNOSIS — E78.2 MIXED DYSLIPIDEMIA: ICD-10-CM

## 2024-04-30 DIAGNOSIS — R01.1 SYSTOLIC MURMUR: ICD-10-CM

## 2024-04-30 DIAGNOSIS — I10 BENIGN ESSENTIAL HTN: ICD-10-CM

## 2024-04-30 DIAGNOSIS — Z71.85 VACCINE COUNSELING: ICD-10-CM

## 2024-04-30 DIAGNOSIS — E55.9 VITAMIN D DEFICIENCY: ICD-10-CM

## 2024-04-30 DIAGNOSIS — Z00.00 ENCOUNTER FOR MEDICARE ANNUAL WELLNESS EXAM: ICD-10-CM

## 2024-04-30 RX ORDER — LISINOPRIL 10 MG/1
10 TABLET ORAL DAILY
Qty: 90 TABLET | Refills: 3 | Status: SHIPPED | OUTPATIENT
Start: 2024-04-30

## 2024-04-30 RX ORDER — ATORVASTATIN CALCIUM 20 MG/1
20 TABLET, FILM COATED ORAL
Qty: 90 TABLET | Refills: 3 | Status: SHIPPED | OUTPATIENT
Start: 2024-04-30

## 2024-04-30 ASSESSMENT — ACTIVITIES OF DAILY LIVING (ADL): BATHING_REQUIRES_ASSISTANCE: 0

## 2024-04-30 ASSESSMENT — FIBROSIS 4 INDEX: FIB4 SCORE: 2.29

## 2024-04-30 ASSESSMENT — PATIENT HEALTH QUESTIONNAIRE - PHQ9: CLINICAL INTERPRETATION OF PHQ2 SCORE: 0

## 2024-04-30 ASSESSMENT — ENCOUNTER SYMPTOMS: GENERAL WELL-BEING: GOOD

## 2024-04-30 NOTE — PROGRESS NOTES
"CC:   Medicare Annual Wellness Visit    HPI:  Char is a 89 y.o. female here for her Medicare Annual Wellness Visit and to review lab results done 4/23/24. She continues to live independently in a villa at [facility] and leads an active lifestyle. She has history of elevated HgA1c without DM diagnosis, and chronic mixed dyslipidemia with Lipitor compliance. She has chronic essential HTN and is compliant with daily Lisinopril use. She has history of vitamin d deficiency with ongoing supplementation and has known right renal mass/ associated alkaline phosphatase level.  Abdominal US was odered on 1/17/23 and patient scheduled US and then cancelled appointment - ultimately US was done on 4/10/23 and showed right renal mass (solid 3.8 cm hypervascular/hypoechoic mass protruding from lateral aspect of kidney). She has seen [facility] and declined further work up nor treatment due to her age. She is not interested in updating audiology exam (will not consider wearing hearing aids), and she has history of recurrent UTIs in the past. She feels a \"twinge\" of dysuria today and would like to have urine sample tested. She has two daughters who live locally and are involved in all aspects of her life when needed. She denies new mental health concerns, is vaccine eligible, maintains an active social life, and is in good spirits today.   Patient Active Problem List    Diagnosis Date Noted    Right renal mass 04/14/2023    History of COVID-19 11/14/2022    Mixed dyslipidemia 12/15/2020    Elevated hemoglobin A1c 12/15/2020    History of recurrent UTIs 01/10/2019    Vitamin D deficiency 09/25/2018    Benign essential HTN 07/13/2017    Systolic murmur 06/29/2017    Osteoporosis 08/24/2016     Current Outpatient Medications   Medication Sig Dispense Refill    atorvastatin (LIPITOR) 20 MG Tab Take 1 Tablet by mouth at bedtime. 90 Tablet 3    lisinopril (PRINIVIL) 10 MG Tab Take 1 Tablet by mouth every day. 90 Tablet 3    Ascorbic " Acid (VITAMIN C ADULT GUMMIES PO) Take  by mouth.      CRANBERRY PO Take 2 Tablets by mouth every day. D mannose/ Cranberry gummies      Cholecalciferol (VITAMIN D3 GUMMIES PO) Take  by mouth.       No current facility-administered medications for this visit.      Current supplements: see MAR   Chronic narcotic pain medicines: no  Allergies: Soy allergy  Exercise: yes  Current social contact/activities: yes  Current mood: good  Advance Directive on file: no    Screening:  Depression Screening  Little interest or pleasure in doing things?  0 - not at all  Feeling down, depressed , or hopeless? 0 - not at all  Patient Health Questionnaire Score: 0     If depressive symptoms identified deferred to follow up visit unless specifically addressed in assessment and plan.    Interpretation of PHQ-9 Total Score   Score Severity   1-4 No Depression   5-9 Mild Depression   10-14 Moderate Depression   15-19 Moderately Severe Depression   20-27 Severe Depression    Screening for Cognitive Impairment  Do you or any of your friends or family members have any concern about your memory? Yes  Three Minute Recall (Leader, Season, Table) 2/3    Otto clock face with all 12 numbers and set the hands to show 10 minutes after 11.  No    Cognitive concerns identified deferred for follow up unless specifically addressed in assessment and plan.    Fall Risk Assessment  Has the patient had two or more falls in the last year or any fall with injury in the last year?  No    Safety Assessment  Do you always wear your seatbelt?  Yes  Any changes to home needed to function safely? No  Difficulty hearing.  No  Patient counseled about all safety risks that were identified.    Functional Assessment ADLs  Are there any barriers preventing you from cooking for yourself or meeting nutritional needs?  No.    Are there any barriers preventing you from driving safely or obtaining transportation?  No.    Are there any barriers preventing you from using a  telephone or calling for help?  No    Are there any barriers preventing you from shopping?  No.    Are there any barriers preventing you from taking care of your own finances?  No    Are there any barriers preventing you from managing your medications?  No    Are there any barriers preventing you from showering, bathing or dressing yourself? No    Are there any barriers preventing you from doing housework or laundry? No  Are there any barriers preventing you from using the toilet?No  Are you currently engaging in any exercise or physical activity?  Yes.      Self-Assessment of Health  What is your perception of your health? Good  Do you sleep more than six hours a night? Yes  In the past 7 days, how much did pain keep you from doing your normal work? None  Do you spend quality time with family or friends (virtually or in person)? Yes  Do you usually eat a heart healthy diet that constists of a variety of fruits, vegetables, whole grains and fiber? Yes  Do you eat foods high in fat and/or Fast Food more than three times per week? No    Advance Care Planning  Do you have an Advance Directive, Living Will, Durable Power of , or POLST?                   Health Maintenance Summary            Overdue - IMM DTaP/Tdap/Td Vaccine (2 - Td or Tdap) Overdue since 3/22/2024      03/22/2014  Imm Admin: Tdap Vaccine    06/19/2007  Imm Admin: TD Vaccine    02/26/1997  Imm Admin: TD Vaccine    07/26/1995  Imm Admin: TD Vaccine              Annual Wellness Visit (Yearly) Next due on 4/30/2025 04/30/2024  Visit Dx: Encounter for Medicare annual wellness exam    04/30/2024  Subsequent Annual Wellness Visit - Includes PPPS ()    01/17/2023  Visit Dx: Encounter for Medicare annual wellness exam    01/17/2023  Subsequent Annual Wellness Visit - Includes PPPS ()    12/26/2021  Subsequent Annual Wellness Visit - Includes PPPS ()    Only the first 5 history entries have been loaded, but more history exists.               Hepatitis B Vaccine (Hep B) (Series Information) Completed      09/10/1997  Imm Admin: Hepatitis B Vaccine (Adol/Adult)    04/09/1997  Imm Admin: Hepatitis B Vaccine (Adol/Adult)    03/05/1997  Imm Admin: Hepatitis B Vaccine (Adol/Adult)              Hepatitis A Vaccine (Hep A) (Series Information) Aged Out      05/13/1998  Imm Admin: Hepatitis A Vaccine, Adult    08/27/1997  Imm Admin: Hepatitis A Vaccine, Adult              Pneumococcal Vaccine: 65+ Years (Series Information) Completed      11/20/2018  Imm Admin: Pneumococcal polysaccharide vaccine (PPSV-23)    03/28/2016  Imm Admin: Pneumococcal Conjugate Vaccine (Prevnar/PCV-13)    10/20/1997  Imm Admin: Pneumococcal polysaccharide vaccine (PPSV-23)              Zoster (Shingles) Vaccines (Series Information) Completed      10/16/2019  Imm Admin: Zoster Vaccine Recombinant (RZV) (SHINGRIX)    08/16/2019  Imm Admin: Zoster Vaccine Recombinant (RZV) (SHINGRIX)    09/23/2016  Imm Admin: Zoster Vaccine Live (ZVL) (Zostavax) - HISTORICAL DATA              Influenza Vaccine (Series Information) Completed      09/18/2023  Imm Admin: Influenza Vaccine Adult HD    09/19/2022  Imm Admin: Influenza Vaccine Adult HD    09/14/2021  Imm Admin: Influenza Vaccine Adult HD    09/04/2020  Imm Admin: Influenza Vaccine Adult HD    09/24/2019  Imm Admin: Influenza, Unspecified - HISTORICAL DATA    Only the first 5 history entries have been loaded, but more history exists.              COVID-19 Vaccine (Series Information) Completed      10/04/2023  Imm Admin: Comirnaty (Covid-19 Vaccine, Mrna, [id number] Formula)    02/17/2023  Imm Admin: PFIZER BIVALENT SARS-COV-2 VACCINE (12+)    04/04/2022  Imm Admin: PFIZER GOMEZ CAP SARS-COV-2 VACCINATION (12+)    10/01/2021  Imm Admin: PFIZER PURPLE CAP SARS-COV-2 VACCINATION (12+)    02/11/2021  Imm Admin: PFIZER PURPLE CAP SARS-COV-2 VACCINATION (12+)    Only the first 5 history entries have been loaded, but more history exists.       "        HPV Vaccines (Series Information) Aged Out      No completion history exists for this topic.              Polio Vaccine (Inactivated Polio) (Series Information) Aged Out      No completion history exists for this topic.              Meningococcal Immunization (Series Information) Aged Out      No completion history exists for this topic.              Discontinued - Bone Density Scan  Discontinued      07/12/2017  DS-BONE DENSITY STUDY (DEXA)                    Patient Care Team:  Gisella Jenkins M.D. as PCP - General (Family Medicine)  Meche Leiva R.N.      Social History     Tobacco Use    Smoking status: Never    Smokeless tobacco: Never   Vaping Use    Vaping Use: Never used   Substance Use Topics    Alcohol use: No     Alcohol/week: 0.0 oz    Drug use: No     Family History   Family history unknown: Yes     She  has a past medical history of Allergy, Arrhythmia (2017), Cataract, History of recurrent UTIs (01/10/2019), Hyperlipidemia, and Hypertension.   Past Surgical History:   Procedure Laterality Date    HIP NAILING INTRAMEDULLARY Left 5/30/2017    Procedure: HIP NAILING INTRAMEDULLARY;  Surgeon: Zion Pimentel M.D.;  Location: SURGERY [facility];  Service:     OPEN REDUCTION Left 2016    hip fracture    MAMMOPLASTY AUGMENTATION  age 30's       ROS:    All positives noted in HPI. All others reviewed and are negative.    Ostomy or other tubes or amputations: no  Chronic oxygen use: no  Last eye exam: UTD per report  : denies urinary incontinence; does not interfere with ADLs/ sleep  Gait: stable  Problems with balance/ difficulty walking: no  Hearing: adequate  Dentition: adequate    Lab results 4/23/24 reviewed with patient at visit today.     Exam:   /68 (BP Location: Left arm, Patient Position: Sitting, BP Cuff Size: Adult)   Pulse 71   Temp 36.6 °C (97.9 °F) (Temporal)   Resp 17   Ht 1.676 m (5' 6\")   Wt 57.5 kg (126 lb 12.8 oz)   SpO2 97%  Body mass index is 20.47 " "kg/m².    Gen: Well developed; well nourished; no acute distress; age appropriate appearance   HEENT: Normocephalic; atraumatic; PEERLA b/l; sclera clear b/l; b/l external auditory canals WNL; b/l TM WNL; nares patent; oropharynx clear; oral mucosa moist; tongue midline; dentition adequate   Neck: No adenopathy; no thyromegaly  CV: Regular rate and rhythm; S1/ S2 present; no murmur, gallop or rub noted  Pulm: No respiratory distress; clear to ascultation b/l; no wheezing or stridor noted b/l  Abd: Adequate bowel sounds noted; soft and nontender; no rebound, rigidity, nor distention  Extremities: No peripheral edema b/l LE extremities/ no clubbing nor cyanosis noted  Skin: Warm and dry; no rashes noted   Neuro: No focal deficits noted; pt is able to get up out of chair unassisted and walk forward  Psych: AAOx4; mood and affect are appropriate    Assessment and Plan:  1. Mixed dyslipidemia  Stable/ recommend patient continue Lipitor 20 mg HS use, and new RX sent to pharmacy.   - atorvastatin (LIPITOR) 20 MG Tab; Take 1 Tablet by mouth at bedtime.  Dispense: 90 Tablet; Refill: 3  - Subsequent Annual Wellness Visit - Includes PPPS ()    2. Benign essential HTN  Stable/ recommend patient continue Lisinopril 10 mg daily use, and new RX sent to pharmacy.   - lisinopril (PRINIVIL) 10 MG Tab; Take 1 Tablet by mouth every day.  Dispense: 90 Tablet; Refill: 3  - Subsequent Annual Wellness Visit - Includes PPPS ()    3. Dysuria  Patient has history of recurrent UTI and felt a \"twinge\" of dysuria this morning. She is requesting urinalysis today, and I will call her with results when available.   - URINALYSIS,CULTURE IF INDICATED; Future  - Subsequent Annual Wellness Visit - Includes PPPS ()    4. Vitamin D deficiency  Stable/ recommend patient continue ongoing Vitamin D supplementation for maintenance.   - Subsequent Annual Wellness Visit - Includes PPPS ()    5. Right renal mass  Known condition - refer to " HPI for details. Patient declines further intervention due to her age.   - Subsequent Annual Wellness Visit - Includes PPPS ()    6. Age-related osteoporosis without current pathological fracture  Patient declines treatment due to age.   - Subsequent Annual Wellness Visit - Includes PPPS ()    7. Elevated hemoglobin A1c  Chronic condition - patient endorses eating cookies daily.   - Subsequent Annual Wellness Visit - Includes PPPS ()    8. Systolic murmur  Chronic/unchanged   - Subsequent Annual Wellness Visit - Includes PPPS ()    9. Vaccine counseling  Patient educated about vaccine eligibility at visit today, and she was directed to obtain vaccine at local pharmacy.   - Subsequent Annual Wellness Visit - Includes PPPS ()    10. Encounter for Medicare annual wellness exam  Patient is stable overall and remains well versed about medical conditions that need additional attention moving forward.   - Subsequent Annual Wellness Visit - Includes PPPS ()       Services needed: no new services needed at this time  Health Care Screening: recommendations as per orders if indicated.  Referrals offered: none  Counseling provided today:  Prevent falls and reduce trip hazards; Secure or remove rugs if present   Maintain working fire alarm and carbon monoxide detectors   Engage in regular physical activity daily and social activities weekly as tolerated   F/U with me quarterly/ PRN sooner if new issues arise

## 2024-05-01 ENCOUNTER — TELEPHONE (OUTPATIENT)
Dept: INTERNAL MEDICINE | Facility: IMAGING CENTER | Age: 89
End: 2024-05-01
Payer: MEDICARE

## 2024-05-01 DIAGNOSIS — N30.00 ACUTE CYSTITIS WITHOUT HEMATURIA: ICD-10-CM

## 2024-05-01 RX ORDER — CEFDINIR 300 MG/1
300 CAPSULE ORAL 2 TIMES DAILY
Qty: 10 CAPSULE | Refills: 0 | Status: SHIPPED | OUTPATIENT
Start: 2024-05-01 | End: 2024-05-06

## 2024-05-01 NOTE — TELEPHONE ENCOUNTER
Spoke with patient regarding urinalysis results and will send in antibiotic prescription now for patient. I will follow up with her when urine culture results become available.

## 2024-05-02 ENCOUNTER — TELEPHONE (OUTPATIENT)
Dept: INTERNAL MEDICINE | Facility: IMAGING CENTER | Age: 89
End: 2024-05-02
Payer: MEDICARE

## 2024-05-02 NOTE — TELEPHONE ENCOUNTER
Spoke with patient about urine culture results and recommend patient complete current antibiotic use/ provide new urine sample in two weeks for test of cure.

## 2024-06-15 DIAGNOSIS — E78.2 MIXED DYSLIPIDEMIA: ICD-10-CM

## 2024-06-17 RX ORDER — ATORVASTATIN CALCIUM 20 MG/1
20 TABLET, FILM COATED ORAL
Qty: 90 TABLET | Refills: 3 | OUTPATIENT
Start: 2024-06-17

## 2024-07-10 ENCOUNTER — HOSPITAL ENCOUNTER (OUTPATIENT)
Dept: RADIOLOGY | Facility: MEDICAL CENTER | Age: 89
End: 2024-07-10
Attending: FAMILY MEDICINE
Payer: MEDICARE

## 2024-07-10 ENCOUNTER — OFFICE VISIT (OUTPATIENT)
Dept: INTERNAL MEDICINE | Facility: IMAGING CENTER | Age: 89
End: 2024-07-10
Payer: MEDICARE

## 2024-07-10 VITALS
WEIGHT: 138.2 LBS | SYSTOLIC BLOOD PRESSURE: 142 MMHG | HEART RATE: 84 BPM | BODY MASS INDEX: 22.21 KG/M2 | HEIGHT: 66 IN | OXYGEN SATURATION: 98 % | TEMPERATURE: 98.2 F | RESPIRATION RATE: 17 BRPM | DIASTOLIC BLOOD PRESSURE: 68 MMHG

## 2024-07-10 DIAGNOSIS — R53.83 OTHER FATIGUE: ICD-10-CM

## 2024-07-10 DIAGNOSIS — R47.81 SLURRED SPEECH: ICD-10-CM

## 2024-07-10 DIAGNOSIS — R06.02 SHORTNESS OF BREATH: ICD-10-CM

## 2024-07-10 DIAGNOSIS — R60.0 PERIPHERAL EDEMA: ICD-10-CM

## 2024-07-10 DIAGNOSIS — I36.1 NONRHEUMATIC TRICUSPID VALVE REGURGITATION: ICD-10-CM

## 2024-07-10 DIAGNOSIS — E61.1 IRON DEFICIENCY: ICD-10-CM

## 2024-07-10 DIAGNOSIS — I35.0 NONRHEUMATIC AORTIC VALVE STENOSIS: ICD-10-CM

## 2024-07-10 PROCEDURE — 3078F DIAST BP <80 MM HG: CPT | Performed by: FAMILY MEDICINE

## 2024-07-10 PROCEDURE — 93000 ELECTROCARDIOGRAM COMPLETE: CPT | Performed by: FAMILY MEDICINE

## 2024-07-10 PROCEDURE — 3077F SYST BP >= 140 MM HG: CPT | Performed by: FAMILY MEDICINE

## 2024-07-10 PROCEDURE — 71046 X-RAY EXAM CHEST 2 VIEWS: CPT

## 2024-07-10 PROCEDURE — 99214 OFFICE O/P EST MOD 30 MIN: CPT | Performed by: FAMILY MEDICINE

## 2024-07-10 ASSESSMENT — FIBROSIS 4 INDEX: FIB4 SCORE: 2.29

## 2024-07-11 ENCOUNTER — HOSPITAL ENCOUNTER (OUTPATIENT)
Dept: RADIOLOGY | Facility: MEDICAL CENTER | Age: 89
End: 2024-07-11
Attending: FAMILY MEDICINE
Payer: MEDICARE

## 2024-07-11 ENCOUNTER — OFFICE VISIT (OUTPATIENT)
Dept: INTERNAL MEDICINE | Facility: IMAGING CENTER | Age: 89
End: 2024-07-11
Payer: MEDICARE

## 2024-07-11 VITALS
HEART RATE: 87 BPM | RESPIRATION RATE: 16 BRPM | DIASTOLIC BLOOD PRESSURE: 64 MMHG | TEMPERATURE: 97.9 F | HEIGHT: 65 IN | WEIGHT: 142.6 LBS | OXYGEN SATURATION: 93 % | BODY MASS INDEX: 23.76 KG/M2 | SYSTOLIC BLOOD PRESSURE: 122 MMHG

## 2024-07-11 DIAGNOSIS — I50.9 ACUTE HEART FAILURE, UNSPECIFIED HEART FAILURE TYPE (HCC): ICD-10-CM

## 2024-07-11 DIAGNOSIS — Z66 PHYSICIAN ORDERS FOR LIFE-SUSTAINING TREATMENT (POLST) FORM INDICATES PATIENT WISH FOR DO-NOT-RESUSCITATE STATUS: ICD-10-CM

## 2024-07-11 DIAGNOSIS — R47.81 SLURRED SPEECH: ICD-10-CM

## 2024-07-11 LAB
GLUCOSE SERPL-MCNC: NORMAL MG/DL (ref 65–99)
IRON SATN MFR SERPL: 22 % (ref 15–55)
IRON SERPL-MCNC: 76 UG/DL (ref 27–139)
T4 FREE SERPL-MCNC: 1.36 NG/DL (ref 0.82–1.77)
TIBC SERPL-MCNC: 346 UG/DL (ref 250–450)
TSH SERPL DL<=0.005 MIU/L-ACNC: 2.47 UIU/ML (ref 0.45–4.5)
UIBC SERPL-MCNC: 270 UG/DL (ref 118–369)
WBC # BLD AUTO: NORMAL K/UL (ref 4.8–10.8)

## 2024-07-11 PROCEDURE — 99215 OFFICE O/P EST HI 40 MIN: CPT | Performed by: FAMILY MEDICINE

## 2024-07-11 PROCEDURE — 70450 CT HEAD/BRAIN W/O DYE: CPT

## 2024-07-11 PROCEDURE — 3078F DIAST BP <80 MM HG: CPT | Performed by: FAMILY MEDICINE

## 2024-07-11 PROCEDURE — 3074F SYST BP LT 130 MM HG: CPT | Performed by: FAMILY MEDICINE

## 2024-07-11 RX ORDER — POTASSIUM CHLORIDE 20 MEQ/1
20 TABLET, EXTENDED RELEASE ORAL EVERY MORNING
Qty: 30 TABLET | Refills: 1 | Status: SHIPPED | OUTPATIENT
Start: 2024-07-11

## 2024-07-11 RX ORDER — FUROSEMIDE 40 MG/1
40 TABLET ORAL EVERY MORNING
Qty: 30 TABLET | Refills: 1 | Status: SHIPPED | OUTPATIENT
Start: 2024-07-11

## 2024-07-11 RX ORDER — METOPROLOL SUCCINATE 25 MG/1
25 TABLET, EXTENDED RELEASE ORAL DAILY
Qty: 30 TABLET | Refills: 1 | Status: SHIPPED | OUTPATIENT
Start: 2024-07-11

## 2024-07-11 ASSESSMENT — FIBROSIS 4 INDEX: FIB4 SCORE: 2.29

## 2024-07-12 LAB — NT-PROBNP SERPL-MCNC: ABNORMAL PG/ML (ref 0–738)

## 2024-07-15 ENCOUNTER — HOSPITAL ENCOUNTER (OUTPATIENT)
Facility: MEDICAL CENTER | Age: 89
End: 2024-07-15
Attending: FAMILY MEDICINE
Payer: MEDICARE

## 2024-07-15 ENCOUNTER — OFFICE VISIT (OUTPATIENT)
Dept: INTERNAL MEDICINE | Facility: IMAGING CENTER | Age: 89
End: 2024-07-15
Payer: MEDICARE

## 2024-07-15 VITALS
WEIGHT: 139 LBS | RESPIRATION RATE: 17 BRPM | HEART RATE: 85 BPM | OXYGEN SATURATION: 95 % | HEIGHT: 65 IN | BODY MASS INDEX: 23.16 KG/M2 | DIASTOLIC BLOOD PRESSURE: 64 MMHG | SYSTOLIC BLOOD PRESSURE: 126 MMHG | TEMPERATURE: 98.8 F

## 2024-07-15 DIAGNOSIS — I35.0 NONRHEUMATIC AORTIC VALVE STENOSIS: ICD-10-CM

## 2024-07-15 DIAGNOSIS — E87.8 ELECTROLYTE IMBALANCE: ICD-10-CM

## 2024-07-15 DIAGNOSIS — I36.1 NONRHEUMATIC TRICUSPID VALVE REGURGITATION: ICD-10-CM

## 2024-07-15 DIAGNOSIS — I50.9 ACUTE HEART FAILURE, UNSPECIFIED HEART FAILURE TYPE (HCC): ICD-10-CM

## 2024-07-15 DIAGNOSIS — R60.0 PERIPHERAL EDEMA: ICD-10-CM

## 2024-07-15 LAB
ALBUMIN SERPL BCP-MCNC: 3.9 G/DL (ref 3.2–4.9)
ALBUMIN/GLOB SERPL: 1.7 G/DL
ALP SERPL-CCNC: 508 U/L (ref 30–99)
ALT SERPL-CCNC: 50 U/L (ref 2–50)
ANION GAP SERPL CALC-SCNC: 12 MMOL/L (ref 7–16)
AST SERPL-CCNC: 29 U/L (ref 12–45)
BILIRUB SERPL-MCNC: 1 MG/DL (ref 0.1–1.5)
BUN SERPL-MCNC: 25 MG/DL (ref 8–22)
CALCIUM ALBUM COR SERPL-MCNC: 9.2 MG/DL (ref 8.5–10.5)
CALCIUM SERPL-MCNC: 9.1 MG/DL (ref 8.5–10.5)
CHLORIDE SERPL-SCNC: 107 MMOL/L (ref 96–112)
CO2 SERPL-SCNC: 24 MMOL/L (ref 20–33)
CREAT SERPL-MCNC: 0.87 MG/DL (ref 0.5–1.4)
GFR SERPLBLD CREATININE-BSD FMLA CKD-EPI: 62 ML/MIN/1.73 M 2
GLOBULIN SER CALC-MCNC: 2.3 G/DL (ref 1.9–3.5)
GLUCOSE SERPL-MCNC: 133 MG/DL (ref 65–99)
POTASSIUM SERPL-SCNC: 3.9 MMOL/L (ref 3.6–5.5)
PROT SERPL-MCNC: 6.2 G/DL (ref 6–8.2)
SODIUM SERPL-SCNC: 143 MMOL/L (ref 135–145)

## 2024-07-15 PROCEDURE — 99214 OFFICE O/P EST MOD 30 MIN: CPT | Performed by: FAMILY MEDICINE

## 2024-07-15 PROCEDURE — 3074F SYST BP LT 130 MM HG: CPT | Performed by: FAMILY MEDICINE

## 2024-07-15 PROCEDURE — 80053 COMPREHEN METABOLIC PANEL: CPT

## 2024-07-15 PROCEDURE — 3078F DIAST BP <80 MM HG: CPT | Performed by: FAMILY MEDICINE

## 2024-07-15 ASSESSMENT — FIBROSIS 4 INDEX: FIB4 SCORE: 2.29

## 2024-07-17 ENCOUNTER — HOSPITAL ENCOUNTER (OUTPATIENT)
Dept: CARDIOLOGY | Facility: MEDICAL CENTER | Age: 89
End: 2024-07-17
Attending: FAMILY MEDICINE
Payer: MEDICARE

## 2024-07-17 ENCOUNTER — TELEPHONE (OUTPATIENT)
Dept: INTERNAL MEDICINE | Facility: IMAGING CENTER | Age: 89
End: 2024-07-17

## 2024-07-17 DIAGNOSIS — R06.02 SHORTNESS OF BREATH: ICD-10-CM

## 2024-07-17 DIAGNOSIS — R60.0 PERIPHERAL EDEMA: ICD-10-CM

## 2024-07-17 LAB
LV EJECT FRACT MOD 2C 99903: 34.74
LV EJECT FRACT MOD 4C 99902: 51.43
LV EJECT FRACT MOD BP 99901: 44

## 2024-07-17 PROCEDURE — 93306 TTE W/DOPPLER COMPLETE: CPT

## 2024-07-17 PROCEDURE — 93306 TTE W/DOPPLER COMPLETE: CPT | Mod: 26 | Performed by: INTERNAL MEDICINE

## 2024-07-22 ENCOUNTER — TELEPHONE (OUTPATIENT)
Dept: CARDIOLOGY | Facility: MEDICAL CENTER | Age: 89
End: 2024-07-22
Payer: MEDICARE

## 2024-07-25 ENCOUNTER — OFFICE VISIT (OUTPATIENT)
Dept: INTERNAL MEDICINE | Facility: IMAGING CENTER | Age: 89
End: 2024-07-25
Payer: MEDICARE

## 2024-07-25 ENCOUNTER — HOSPITAL ENCOUNTER (OUTPATIENT)
Facility: MEDICAL CENTER | Age: 89
End: 2024-07-25
Attending: FAMILY MEDICINE
Payer: MEDICARE

## 2024-07-25 VITALS
HEART RATE: 77 BPM | RESPIRATION RATE: 17 BRPM | SYSTOLIC BLOOD PRESSURE: 120 MMHG | OXYGEN SATURATION: 97 % | WEIGHT: 124.8 LBS | DIASTOLIC BLOOD PRESSURE: 62 MMHG | BODY MASS INDEX: 20.79 KG/M2 | HEIGHT: 65 IN | TEMPERATURE: 97.9 F

## 2024-07-25 DIAGNOSIS — I50.41 ACUTE COMBINED SYSTOLIC (CONGESTIVE) AND DIASTOLIC (CONGESTIVE) HEART FAILURE (HCC): ICD-10-CM

## 2024-07-25 DIAGNOSIS — Z66 PHYSICIAN ORDERS FOR LIFE-SUSTAINING TREATMENT (POLST) FORM INDICATES PATIENT WISH FOR DO-NOT-RESUSCITATE STATUS: ICD-10-CM

## 2024-07-25 DIAGNOSIS — R60.0 PERIPHERAL EDEMA: ICD-10-CM

## 2024-07-25 LAB
ALBUMIN SERPL BCP-MCNC: 4 G/DL (ref 3.2–4.9)
ALBUMIN/GLOB SERPL: 1.3 G/DL
ALP SERPL-CCNC: 316 U/L (ref 30–99)
ALT SERPL-CCNC: 27 U/L (ref 2–50)
ANION GAP SERPL CALC-SCNC: 13 MMOL/L (ref 7–16)
AST SERPL-CCNC: 21 U/L (ref 12–45)
BILIRUB SERPL-MCNC: 0.9 MG/DL (ref 0.1–1.5)
BUN SERPL-MCNC: 24 MG/DL (ref 8–22)
CALCIUM ALBUM COR SERPL-MCNC: 9.5 MG/DL (ref 8.5–10.5)
CALCIUM SERPL-MCNC: 9.5 MG/DL (ref 8.5–10.5)
CHLORIDE SERPL-SCNC: 104 MMOL/L (ref 96–112)
CO2 SERPL-SCNC: 26 MMOL/L (ref 20–33)
CREAT SERPL-MCNC: 0.98 MG/DL (ref 0.5–1.4)
GFR SERPLBLD CREATININE-BSD FMLA CKD-EPI: 53 ML/MIN/1.73 M 2
GLOBULIN SER CALC-MCNC: 3 G/DL (ref 1.9–3.5)
GLUCOSE SERPL-MCNC: 104 MG/DL (ref 65–99)
NT-PROBNP SERPL IA-MCNC: 8732 PG/ML (ref 0–125)
POTASSIUM SERPL-SCNC: 4.4 MMOL/L (ref 3.6–5.5)
PROT SERPL-MCNC: 7 G/DL (ref 6–8.2)
SODIUM SERPL-SCNC: 143 MMOL/L (ref 135–145)

## 2024-07-25 PROCEDURE — 80053 COMPREHEN METABOLIC PANEL: CPT

## 2024-07-25 PROCEDURE — 3074F SYST BP LT 130 MM HG: CPT | Performed by: FAMILY MEDICINE

## 2024-07-25 PROCEDURE — 83880 ASSAY OF NATRIURETIC PEPTIDE: CPT

## 2024-07-25 PROCEDURE — 99214 OFFICE O/P EST MOD 30 MIN: CPT | Performed by: FAMILY MEDICINE

## 2024-07-25 PROCEDURE — 3078F DIAST BP <80 MM HG: CPT | Performed by: FAMILY MEDICINE

## 2024-07-25 RX ORDER — POTASSIUM CHLORIDE 750 MG/1
10 TABLET, FILM COATED, EXTENDED RELEASE ORAL EVERY MORNING
Qty: 30 TABLET | Refills: 1 | Status: SHIPPED | OUTPATIENT
Start: 2024-07-25

## 2024-07-25 RX ORDER — FUROSEMIDE 20 MG/1
20 TABLET ORAL EVERY MORNING
Qty: 30 TABLET | Refills: 1 | Status: SHIPPED | OUTPATIENT
Start: 2024-07-25

## 2024-07-25 ASSESSMENT — FIBROSIS 4 INDEX: FIB4 SCORE: 1.73

## 2024-08-09 ENCOUNTER — OFFICE VISIT (OUTPATIENT)
Dept: INTERNAL MEDICINE | Facility: IMAGING CENTER | Age: 89
End: 2024-08-09
Payer: MEDICARE

## 2024-08-09 VITALS
BODY MASS INDEX: 20.76 KG/M2 | OXYGEN SATURATION: 94 % | HEART RATE: 87 BPM | RESPIRATION RATE: 17 BRPM | HEIGHT: 65 IN | DIASTOLIC BLOOD PRESSURE: 62 MMHG | TEMPERATURE: 97.9 F | WEIGHT: 124.6 LBS | SYSTOLIC BLOOD PRESSURE: 120 MMHG

## 2024-08-09 DIAGNOSIS — I36.1 NONRHEUMATIC TRICUSPID VALVE REGURGITATION: ICD-10-CM

## 2024-08-09 DIAGNOSIS — I50.41 ACUTE COMBINED SYSTOLIC AND DIASTOLIC HEART FAILURE (HCC): ICD-10-CM

## 2024-08-09 DIAGNOSIS — I35.0 NONRHEUMATIC AORTIC VALVE STENOSIS: ICD-10-CM

## 2024-08-09 DIAGNOSIS — Z66 PHYSICIAN ORDERS FOR LIFE-SUSTAINING TREATMENT (POLST) FORM INDICATES PATIENT WISH FOR DO-NOT-RESUSCITATE STATUS: ICD-10-CM

## 2024-08-09 DIAGNOSIS — R60.0 PERIPHERAL EDEMA: ICD-10-CM

## 2024-08-09 PROCEDURE — 99214 OFFICE O/P EST MOD 30 MIN: CPT | Performed by: FAMILY MEDICINE

## 2024-08-09 PROCEDURE — 3078F DIAST BP <80 MM HG: CPT | Performed by: FAMILY MEDICINE

## 2024-08-09 PROCEDURE — 3074F SYST BP LT 130 MM HG: CPT | Performed by: FAMILY MEDICINE

## 2024-08-09 ASSESSMENT — FIBROSIS 4 INDEX: FIB4 SCORE: 1.7

## 2024-08-11 NOTE — PROGRESS NOTES
"Chief Complaint   Patient presents with    Follow-Up       HPI:  Patient is a 89 y.o. female established patient who presents today, with her daughters, for a two week follow up visit. She is feeling well at this time, reports peripheral edema has resolved, and home body weights are stable. She is compliant with daily medication use and continues to remain very socially active with her family. She has caregivers twice weekly and is not driving per my recommendations at this time.     Patient Active Problem List    Diagnosis Date Noted    Peripheral edema 07/15/2024    Acute heart failure (HCC) 07/11/2024    Physician orders for life-sustaining treatment (POLST) form indicates patient wish for do-not-resuscitate status 07/11/2024    Nonrheumatic tricuspid valve regurgitation 07/10/2024    Nonrheumatic aortic valve stenosis 07/10/2024    Right renal mass 04/14/2023    History of COVID-19 11/14/2022    Mixed dyslipidemia 12/15/2020    Elevated hemoglobin A1c 12/15/2020    History of recurrent UTIs 01/10/2019    Vitamin D deficiency 09/25/2018    Benign essential HTN 07/13/2017    Systolic murmur 06/29/2017    Osteoporosis 08/24/2016       Past medical, surgical, family, and social history was reviewed and updated in Epic chart by me today.     Medications and allergies reviewed and updated in Epic chart by me today.     ROS:  Pertinent positives listed above in HPI. All other systems have been reviewed and are negative.    PE:   /62 (BP Location: Left arm, Patient Position: Sitting, BP Cuff Size: Adult)   Pulse 87   Temp 36.6 °C (97.9 °F) (Temporal)   Resp 17   Ht 1.651 m (5' 5\")   Wt 56.5 kg (124 lb 9.6 oz)   SpO2 94%   BMI 20.73 kg/m²   Vital signs reviewed with patient.     Gen: Well developed; well nourished; no acute distress; non toxic appearance   CV: Regular rate and rhythm; S1/ S2 present; + murmur; no gallop or rub noted  Pulm: No respiratory distress; clear to ascultation b/l; no wheezing or " stridor noted b/l  Extremities: trace peripheral edema b/l LE extremities/ no clubbing nor cyanosis noted  Skin: Warm and dry; no rashes noted   Neuro: No new focal deficits noted   Psych: AAOx3; mood and affect are at her baseline     A/P:  1. 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 She has an appointment with Dr. Koo on 8/27/24 for cardiology evaluation, and she is feeling better overall. Her daughters continue to monitor her weight daily, and patient is eating low sodium diet.     2. Peripheral edema  Resolved/ patient has responded well to oral Lasix use and recommend patient continue Lasix 20 mg/KCl 10 meq daily for maintenance.     3. Physician orders for life-sustaining treatment (POLST) form indicates patient wish for do-not-resuscitate status  Patient has POLST form prominently displayed at her home, and she refuses any aggressive intervention nor hospital management to date.    F/U with me in one month/ PRN sooner as needed for ongoing management.

## 2024-08-20 ENCOUNTER — TELEPHONE (OUTPATIENT)
Dept: CARDIOLOGY | Facility: MEDICAL CENTER | Age: 89
End: 2024-08-20
Payer: MEDICARE

## 2024-08-20 NOTE — TELEPHONE ENCOUNTER
Spoke with patient daughter Lorraine which I did verify name is on release of information. Patient daughter confirmed she saw a cardiologists 25+. No recent cardiologist.

## 2024-08-22 ENCOUNTER — OFFICE VISIT (OUTPATIENT)
Dept: INTERNAL MEDICINE | Facility: IMAGING CENTER | Age: 89
End: 2024-08-22
Payer: MEDICARE

## 2024-08-22 VITALS
WEIGHT: 123.6 LBS | HEIGHT: 65 IN | RESPIRATION RATE: 17 BRPM | TEMPERATURE: 98.7 F | OXYGEN SATURATION: 98 % | BODY MASS INDEX: 20.59 KG/M2 | DIASTOLIC BLOOD PRESSURE: 72 MMHG | HEART RATE: 103 BPM | SYSTOLIC BLOOD PRESSURE: 138 MMHG

## 2024-08-22 DIAGNOSIS — I50.41 ACUTE COMBINED SYSTOLIC (CONGESTIVE) AND DIASTOLIC (CONGESTIVE) HEART FAILURE (HCC): ICD-10-CM

## 2024-08-22 DIAGNOSIS — R60.0 PERIPHERAL EDEMA: ICD-10-CM

## 2024-08-22 PROCEDURE — 99214 OFFICE O/P EST MOD 30 MIN: CPT | Performed by: FAMILY MEDICINE

## 2024-08-22 PROCEDURE — 3075F SYST BP GE 130 - 139MM HG: CPT | Performed by: FAMILY MEDICINE

## 2024-08-22 PROCEDURE — 3078F DIAST BP <80 MM HG: CPT | Performed by: FAMILY MEDICINE

## 2024-08-22 RX ORDER — POTASSIUM CHLORIDE 750 MG/1
10 TABLET, EXTENDED RELEASE ORAL EVERY MORNING
Qty: 30 TABLET | Refills: 1 | Status: SHIPPED | OUTPATIENT
Start: 2024-08-22 | End: 2024-08-30 | Stop reason: SDUPTHER

## 2024-08-22 RX ORDER — METOPROLOL SUCCINATE 25 MG/1
25 TABLET, EXTENDED RELEASE ORAL DAILY
Qty: 30 TABLET | Refills: 1 | Status: SHIPPED | OUTPATIENT
Start: 2024-08-22 | End: 2024-08-30 | Stop reason: SDUPTHER

## 2024-08-22 RX ORDER — FUROSEMIDE 20 MG
20 TABLET ORAL EVERY MORNING
Qty: 30 TABLET | Refills: 1 | Status: SHIPPED | OUTPATIENT
Start: 2024-08-22 | End: 2024-08-30 | Stop reason: SDUPTHER

## 2024-08-22 ASSESSMENT — FIBROSIS 4 INDEX: FIB4 SCORE: 1.7

## 2024-08-23 NOTE — PROGRESS NOTES
"Chief Complaint   Patient presents with    Skin Discoloration     Bilateral legs.       HPI:  Patient is a 89 y.o. female established patient who presents today, with her daughter Lorraine, for a follow up visit. Patient and her daughter had concerns about the color of Char's lower legs earlier this week (skin was discolored) but condition has now resolved. Patient endorses 100% medication compliance, denies new peripheral edema issues (body weight stable with daily home monitoring), and is scheduled to see Dr. Koo on 8/27/24 for cardiology evaluation.     Patient Active Problem List    Diagnosis Date Noted    Peripheral edema 07/15/2024    Acute heart failure (HCC) 07/11/2024    Physician orders for life-sustaining treatment (POLST) form indicates patient wish for do-not-resuscitate status 07/11/2024    Nonrheumatic tricuspid valve regurgitation 07/10/2024    Nonrheumatic aortic valve stenosis 07/10/2024    Right renal mass 04/14/2023    History of COVID-19 11/14/2022    Mixed dyslipidemia 12/15/2020    Elevated hemoglobin A1c 12/15/2020    History of recurrent UTIs 01/10/2019    Vitamin D deficiency 09/25/2018    Benign essential HTN 07/13/2017    Systolic murmur 06/29/2017    Osteoporosis 08/24/2016       Past medical, surgical, family, and social history was reviewed and updated in Epic chart by me today.     Medications and allergies reviewed and updated in Epic chart by me today.     ROS:  Pertinent positives listed above in HPI. All other systems have been reviewed and are negative.    PE:   /72 (BP Location: Left arm, Patient Position: Sitting, BP Cuff Size: Adult)   Pulse (!) 103   Temp 37.1 °C (98.7 °F) (Temporal)   Resp 17   Ht 1.651 m (5' 5\")   Wt 56.1 kg (123 lb 9.6 oz)   SpO2 98%   BMI 20.57 kg/m²   Vital signs reviewed with patient.     Gen: Well developed; well nourished; no acute distress; younger than stated age in appearance   CV: Regular rate and rhythm; S1/ S2 present; + murmur; " no gallop or rub noted  Pulm: No respiratory distress; clear to ascultation b/l; no wheezing or stridor noted b/l  Extremities: No new peripheral edema b/l LE extremities/ no clubbing nor cyanosis noted/ mild venous stasis changes present b/l  Skin: Warm and dry; no rashes noted   Neuro: No new focal deficits noted   Psych: AAOx3; mood and affect are at her baseline     A/P:  1. Acute combined systolic (congestive) and diastolic (congestive) heart failure (HCC)  New diagnosis for patient - she has declined aggressive treatment to date but is compliant with daily Lasix/ KCl/ Toprol XL use. She has an appointment with Dr. Koo on 8/27/24 for cardiology evaluation, and her daughters continue to monitor her weight daily, and patient is eating low sodium diet.   - furosemide (LASIX) 20 MG Tab; Take 1 Tablet by mouth every morning.  Dispense: 30 Tablet; Refill: 1  - potassium chloride ER (KLOR-CON) 10 MEQ tablet; Take 1 Tablet by mouth every morning.  Dispense: 30 Tablet; Refill: 1  - metoprolol SR (TOPROL XL) 25 MG TABLET SR 24 HR; Take 1 Tablet by mouth every day.  Dispense: 30 Tablet; Refill: 1    2. Peripheral edema  Resolved/ patient has responded well to oral Lasix use and recommend patient continue Lasix 20 mg/KCl 10 meq daily for maintenance.   - furosemide (LASIX) 20 MG Tab; Take 1 Tablet by mouth every morning.  Dispense: 30 Tablet; Refill: 1  - potassium chloride ER (KLOR-CON) 10 MEQ tablet; Take 1 Tablet by mouth every morning.  Dispense: 30 Tablet; Refill: 1

## 2024-08-27 ENCOUNTER — OFFICE VISIT (OUTPATIENT)
Dept: CARDIOLOGY | Facility: MEDICAL CENTER | Age: 89
End: 2024-08-27
Attending: FAMILY MEDICINE
Payer: MEDICARE

## 2024-08-27 VITALS
DIASTOLIC BLOOD PRESSURE: 72 MMHG | BODY MASS INDEX: 20.83 KG/M2 | HEIGHT: 65 IN | HEART RATE: 87 BPM | OXYGEN SATURATION: 96 % | WEIGHT: 125 LBS | SYSTOLIC BLOOD PRESSURE: 130 MMHG | RESPIRATION RATE: 12 BRPM

## 2024-08-27 DIAGNOSIS — I51.89 LEFT VENTRICULAR SYSTOLIC DYSFUNCTION, NYHA CLASS 3: ICD-10-CM

## 2024-08-27 DIAGNOSIS — R60.0 PERIPHERAL EDEMA: ICD-10-CM

## 2024-08-27 DIAGNOSIS — I34.0 SEVERE MITRAL REGURGITATION: ICD-10-CM

## 2024-08-27 DIAGNOSIS — I50.20 ACC/AHA STAGE C SYSTOLIC HEART FAILURE (HCC): ICD-10-CM

## 2024-08-27 DIAGNOSIS — R06.02 SHORTNESS OF BREATH: ICD-10-CM

## 2024-08-27 DIAGNOSIS — I35.0 NONRHEUMATIC AORTIC VALVE STENOSIS: ICD-10-CM

## 2024-08-27 DIAGNOSIS — I35.1 MODERATE AORTIC REGURGITATION: ICD-10-CM

## 2024-08-27 DIAGNOSIS — I36.1 NONRHEUMATIC TRICUSPID VALVE REGURGITATION: ICD-10-CM

## 2024-08-27 DIAGNOSIS — I10 PRIMARY HYPERTENSION: ICD-10-CM

## 2024-08-27 LAB — EKG IMPRESSION: NORMAL

## 2024-08-27 PROCEDURE — 99212 OFFICE O/P EST SF 10 MIN: CPT | Performed by: INTERNAL MEDICINE

## 2024-08-27 PROCEDURE — 99213 OFFICE O/P EST LOW 20 MIN: CPT | Performed by: INTERNAL MEDICINE

## 2024-08-27 PROCEDURE — 94618 PULMONARY STRESS TESTING: CPT | Performed by: INTERNAL MEDICINE

## 2024-08-27 PROCEDURE — 93005 ELECTROCARDIOGRAM TRACING: CPT | Performed by: INTERNAL MEDICINE

## 2024-08-27 ASSESSMENT — ENCOUNTER SYMPTOMS
DIZZINESS: 0
VOMITING: 0
HEADACHES: 0
EYE DISCHARGE: 0
LOSS OF CONSCIOUSNESS: 0
FALLS: 0
ORTHOPNEA: 0
ABDOMINAL PAIN: 0
MYALGIAS: 0
CHILLS: 0
NAUSEA: 0
BLURRED VISION: 0
COUGH: 0
EYE PAIN: 0
FEVER: 0
DEPRESSION: 0
CLAUDICATION: 0
SHORTNESS OF BREATH: 0
HALLUCINATIONS: 0
PND: 0
BRUISES/BLEEDS EASILY: 0
WEIGHT LOSS: 0
SENSORY CHANGE: 0
BLOOD IN STOOL: 0
SPEECH CHANGE: 0
PALPITATIONS: 0
DOUBLE VISION: 0

## 2024-08-27 ASSESSMENT — MINNESOTA LIVING WITH HEART FAILURE QUESTIONNAIRE (MLHF)
TOTAL_SCORE: 26
DIFFICULTY TO CONCENTRATE OR REMEMBERING THINGS: 1
DIFFICULTY WITH RECREATIONAL PASTIMES, SPORTS, HOBBIES: 3
HAVING TO SIT OR LIE DOWN DURING THE DAY: 4
GIVING YOU SIDE EFFECTS FROM TREATMENTS: 0
TIRED, FATIGUED OR LOW ON ENERGY: 2
DIFFICULTY SOCIALIZING WITH FAMILY OR FRIENDS: 0
DIFFICULTY WORKING TO EARN A LIVING: 0
MAKING YOU STAY IN A HOSPITAL: 0
FEELING LIKE A BURDEN TO FAMILY AND FRIENDS: 0
LOSS OF SELF CONTROL IN YOUR LIFE: 1
DIFFICULTY SLEEPING WELL AT NIGHT: 3
MAKING YOU SHORT OF BREATH: 2
COSTING YOU MONEY FOR MEDICAL CARE: 0
EATING LESS FOODS YOU LIKE: 0
WORKING AROUND THE HOUSE OR YARD DIFFICULT: 1
SWELLING IN ANKLES OR LEGS: 3
MAKING YOU WORRY: 1
WALKING ABOUT OR CLIMBING STAIRS DIFFICULT: 2
DIFFICULTY GOING AWAY FROM HOME: 2
DIFFICULTY WITH SEXUAL ACTIVITIES: 0
MAKING YOU FEEL DEPRESSED: 1

## 2024-08-27 ASSESSMENT — 6 MINUTE WALK TEST (6MWT): TOTAL DISTANCE WALKED (METERS): 234

## 2024-08-27 ASSESSMENT — FIBROSIS 4 INDEX: FIB4 SCORE: 1.7

## 2024-08-27 NOTE — PROGRESS NOTES
Chief Complaint   Patient presents with    Congestive Heart Failure     F/V DX: Acute heart failure (HCC)    Dyslipidemia     F/V DX:   Mixed dyslipidemia    Hypertension     F/V DX:   Benign essential HTN       Subjective     Char Rockwell is a 89 y.o. female who presents today for cardiac care and management for congestive heart failure with LV function of 50%, elevated NT proBNP of 8732, exertional dyspnea, hypertension.    07/2024 LVEF of 50% with global hypokinesis. Severe MR, moderate AR. I have independently interpreted and reviewed echocardiogram's actual images.     Patient still gets winded with daily living activities and exertion. No symptoms at rest.    Patient was able to complete 234 m during his 6 minute walk test. her O2 saturation at baseline was 96% and at the end of the test, the O2 saturation was 94%. she reported 0.5 level of dyspnea on River scale.    I have independently interpreted and reviewed blood tests results with patient in clinic which shows LDL level of 83, triglycerides level of 82, GFR of 53, NT pro BNP of 8732, K of 4.4.    She has history of frequent UTIs.    I have personally interpreted EKG today with patient, there is no evidence of acute coronary syndrome, no evidence of prior infarct, normal MO and QT interval, no significant conduction disease. Sinus rhythm.      Past Medical History:   Diagnosis Date    Allergy     environmental    Arrhythmia 2017    H/O a fib; not present on EKG 2018    Cataract     History of recurrent UTIs 01/10/2019    Hyperlipidemia     Hypertension      Past Surgical History:   Procedure Laterality Date    HIP NAILING INTRAMEDULLARY Left 5/30/2017    Procedure: HIP NAILING INTRAMEDULLARY;  Surgeon: Zion Pimentel M.D.;  Location: SURGERY [facility];  Service:     OPEN REDUCTION Left 2016    hip fracture    MAMMOPLASTY AUGMENTATION  age 30's     Family History   Family history unknown: Yes     Social History     Socioeconomic History     Marital status:      Spouse name: Not on file    Number of children: Not on file    Years of education: Not on file    Highest education level: Not on file   Occupational History    Not on file   Tobacco Use    Smoking status: Never    Smokeless tobacco: Never   Vaping Use    Vaping status: Never Used   Substance and Sexual Activity    Alcohol use: No     Alcohol/week: 0.0 oz    Drug use: No    Sexual activity: Not Currently   Other Topics Concern    Not on file   Social History Narrative    Not on file     Social Determinants of Health     Financial Resource Strain: Not on file   Food Insecurity: Not on file   Transportation Needs: Not on file   Physical Activity: Not on file   Stress: Not on file   Social Connections: Not on file   Intimate Partner Violence: Not on file   Housing Stability: Not on file     Allergies   Allergen Reactions    Soy Allergy      Upset stomach - intolerance.     Outpatient Encounter Medications as of 8/27/2024   Medication Sig Dispense Refill    furosemide (LASIX) 20 MG Tab Take 1 Tablet by mouth every morning. 30 Tablet 1    potassium chloride ER (KLOR-CON) 10 MEQ tablet Take 1 Tablet by mouth every morning. 30 Tablet 1    metoprolol SR (TOPROL XL) 25 MG TABLET SR 24 HR Take 1 Tablet by mouth every day. 30 Tablet 1    atorvastatin (LIPITOR) 20 MG Tab Take 1 Tablet by mouth at bedtime. 90 Tablet 3    lisinopril (PRINIVIL) 10 MG Tab Take 1 Tablet by mouth every day. 90 Tablet 3    Ascorbic Acid (VITAMIN C ADULT GUMMIES PO) Take  by mouth.      CRANBERRY PO Take 2 Tablets by mouth every day. D mannose/ Cranberry gummies      Cholecalciferol (VITAMIN D3 GUMMIES PO) Take  by mouth.       No facility-administered encounter medications on file as of 8/27/2024.     Review of Systems   Constitutional:  Negative for chills, fever, malaise/fatigue and weight loss.   HENT:  Negative for ear discharge, ear pain, hearing loss and nosebleeds.    Eyes:  Negative for blurred vision, double  "vision, pain and discharge.   Respiratory:  Negative for cough and shortness of breath.    Cardiovascular:  Negative for chest pain, palpitations, orthopnea, claudication, leg swelling and PND.   Gastrointestinal:  Negative for abdominal pain, blood in stool, melena, nausea and vomiting.   Genitourinary:  Negative for dysuria and hematuria.   Musculoskeletal:  Negative for falls, joint pain and myalgias.   Skin:  Negative for itching and rash.   Neurological:  Negative for dizziness, sensory change, speech change, loss of consciousness and headaches.   Endo/Heme/Allergies:  Negative for environmental allergies. Does not bruise/bleed easily.   Psychiatric/Behavioral:  Negative for depression, hallucinations and suicidal ideas.               Objective     /72 (BP Location: Left arm, Patient Position: Sitting, BP Cuff Size: Adult)   Pulse 87   Resp 12   Ht 1.651 m (5' 5\")   Wt 56.7 kg (125 lb)   SpO2 96%   BMI 20.80 kg/m²     Physical Exam  Vitals and nursing note reviewed.   Constitutional:       General: She is not in acute distress.     Appearance: She is not diaphoretic.   HENT:      Head: Normocephalic and atraumatic.      Right Ear: External ear normal.      Left Ear: External ear normal.      Nose: No congestion or rhinorrhea.   Eyes:      General:         Right eye: No discharge.         Left eye: No discharge.   Neck:      Thyroid: No thyromegaly.      Vascular: No JVD.   Cardiovascular:      Rate and Rhythm: Normal rate and regular rhythm.      Pulses: Normal pulses.   Pulmonary:      Effort: No respiratory distress.   Abdominal:      General: There is no distension.      Tenderness: There is no abdominal tenderness.   Musculoskeletal:         General: No swelling or tenderness.      Right lower leg: No edema.      Left lower leg: No edema.   Skin:     General: Skin is warm and dry.   Neurological:      Mental Status: She is alert and oriented to person, place, and time.      Cranial Nerves: No " cranial nerve deficit.   Psychiatric:         Behavior: Behavior normal.                Assessment & Plan     1. ACC/AHA stage C systolic heart failure (HCC)        2. Primary hypertension  EKG      3. Left ventricular systolic dysfunction, NYHA class 3        4. Shortness of breath  Basic Metabolic Panel    proBrain Natriuretic Peptide, NT      5. Peripheral edema        6. Severe mitral regurgitation        7. Moderate aortic regurgitation            Medical Decision Making: Today's Assessment/Status/Plan:   At this time, patient's overall clinical status of congestive heart failure may be related to valvular disease of severe mitral regurgitation.  To further evaluate valvular disease, I do think the patient should undergo transesophageal echocardiogram for further assessment of aortic regurgitation and mitral regurgitation. However, she would like to defer any further invasive procedures at this time.    In the meantime, I will optimize guideline directed medical therapy for congestive heart failure with LV function of 50%.     She has history of frequent UTIs. SGLT2i is not good option.    Continue Lasix 20 mg daily.    Blood pressure is well controlled. Continue Lisinopril 10 mg daily.    Continue Atorvastatin 20 mg daily.    Today, based on physical examination findings, patient is euvolemic. No JVD, lungs are clear to auscultation, no pitting edema in bilateral lower extremities, no ascites.    Dry weight is 125 lbs.      This visit encounter signifies the visit complexity inherent to evaluation and management associated with medical care services that serve as the continuing focal point for all needed health care services and/or with medical care services that are part of ongoing care related to this patient's single, serious condition, complex cardiac condition.    Shirlene Koo M.D.

## 2024-08-30 ENCOUNTER — OFFICE VISIT (OUTPATIENT)
Dept: INTERNAL MEDICINE | Facility: IMAGING CENTER | Age: 89
End: 2024-08-30
Payer: MEDICARE

## 2024-08-30 VITALS
OXYGEN SATURATION: 98 % | BODY MASS INDEX: 20.16 KG/M2 | HEIGHT: 65 IN | WEIGHT: 121 LBS | SYSTOLIC BLOOD PRESSURE: 122 MMHG | TEMPERATURE: 98.3 F | RESPIRATION RATE: 14 BRPM | HEART RATE: 85 BPM | DIASTOLIC BLOOD PRESSURE: 68 MMHG

## 2024-08-30 DIAGNOSIS — R60.0 PERIPHERAL EDEMA: ICD-10-CM

## 2024-08-30 DIAGNOSIS — I50.20 ACC/AHA STAGE C SYSTOLIC HEART FAILURE (HCC): ICD-10-CM

## 2024-08-30 PROBLEM — I50.9 ACUTE HEART FAILURE (HCC): Status: RESOLVED | Noted: 2024-07-11 | Resolved: 2024-08-30

## 2024-08-30 RX ORDER — FUROSEMIDE 20 MG
20 TABLET ORAL EVERY MORNING
Qty: 90 TABLET | Refills: 1 | Status: SHIPPED | OUTPATIENT
Start: 2024-08-30

## 2024-08-30 RX ORDER — POTASSIUM CHLORIDE 750 MG/1
10 TABLET, EXTENDED RELEASE ORAL EVERY MORNING
Qty: 90 TABLET | Refills: 1 | Status: SHIPPED | OUTPATIENT
Start: 2024-08-30

## 2024-08-30 RX ORDER — METOPROLOL SUCCINATE 25 MG/1
25 TABLET, EXTENDED RELEASE ORAL DAILY
Qty: 90 TABLET | Refills: 1 | Status: SHIPPED | OUTPATIENT
Start: 2024-08-30

## 2024-08-30 ASSESSMENT — FIBROSIS 4 INDEX: FIB4 SCORE: 1.7

## 2024-08-30 NOTE — PROGRESS NOTES
"Chief Complaint   Patient presents with    Follow-Up       HPI:  Patient is a 89 y.o. female established patient who presents today for a follow up appointment with her two daughters accompanying her. She saw Dr. Koo on 8/27/24 for cardiology evaluation, and new medication changes were made. Patient had normal six minute walk at his office, and she is scheduled for cardiac education on 9/10/24. 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     Patient Active Problem List    Diagnosis Date Noted    ACC/AHA stage C systolic heart failure (HCC) 08/30/2024    Peripheral edema 07/15/2024    Physician orders for life-sustaining treatment (POLST) form indicates patient wish for do-not-resuscitate status 07/11/2024    Nonrheumatic tricuspid valve regurgitation 07/10/2024    Nonrheumatic aortic valve stenosis 07/10/2024    Right renal mass 04/14/2023    History of COVID-19 11/14/2022    Mixed dyslipidemia 12/15/2020    Elevated hemoglobin A1c 12/15/2020    History of recurrent UTIs 01/10/2019    Vitamin D deficiency 09/25/2018    Benign essential HTN 07/13/2017    Systolic murmur 06/29/2017    Osteoporosis 08/24/2016       Past medical, surgical, family, and social history was reviewed and updated in Epic chart by me today.     Medications and allergies reviewed and updated in Epic chart by me today.     ROS:  Pertinent positives listed above in HPI. All other systems have been reviewed and are negative.    PE:   /68   Pulse 85   Temp 36.8 °C (98.3 °F)   Resp 14   Ht 1.651 m (5' 5\")   Wt 54.9 kg (121 lb)   SpO2 98%   BMI 20.14 kg/m²   Vital signs reviewed with patient.     Gen: Well developed; well nourished; no acute distress; non toxic appearance   CV: Regular rate and rhythm; S1/ S2 present; + murmur, gallop or rub noted  Pulm: No respiratory " distress; clear to ascultation b/l; no wheezing or stridor noted b/l   Extremities: No significant peripheral edema b/l LE extremities/ no clubbing nor cyanosis noted  Skin: Warm and dry; no rashes noted   Neuro: No focal deficits noted   Psych: AAOx4; mood and affect are at her baseline     A/P:  1. ACC/AHA stage C systolic heart failure (HCC)  New diagnosis for patient. She established care with Dr. Koo on 8/7/24 and is not interested in invasive management at this time. He recommended she continue current daily medication use and will follow. New RX sent to pharmacy.   - metoprolol SR (TOPROL XL) 25 MG TABLET SR 24 HR; Take 1 Tablet by mouth every day.  Dispense: 90 Tablet; Refill: 1  - furosemide (LASIX) 20 MG Tab; Take 1 Tablet by mouth every morning.  Dispense: 90 Tablet; Refill: 1  - potassium chloride ER (KLOR-CON) 10 MEQ tablet; Take 1 Tablet by mouth every morning.  Dispense: 90 Tablet; Refill: 1    2. Peripheral edema  Resolved/ recommend patient continue Lasix 20 mg/ KCl 10 mg daily for maintenance, and new RX sent to pharmacy.   - furosemide (LASIX) 20 MG Tab; Take 1 Tablet by mouth every morning.  Dispense: 90 Tablet; Refill: 1  - potassium chloride ER (KLOR-CON) 10 MEQ tablet; Take 1 Tablet by mouth every morning.  Dispense: 90 Tablet; Refill: 1

## 2024-09-06 ENCOUNTER — TELEMEDICINE (OUTPATIENT)
Dept: INTERNAL MEDICINE | Facility: IMAGING CENTER | Age: 89
End: 2024-09-06
Payer: MEDICARE

## 2024-09-06 DIAGNOSIS — R60.0 PERIPHERAL EDEMA: ICD-10-CM

## 2024-09-06 DIAGNOSIS — I35.0 NONRHEUMATIC AORTIC VALVE STENOSIS: ICD-10-CM

## 2024-09-06 DIAGNOSIS — I50.20 ACC/AHA STAGE C SYSTOLIC HEART FAILURE (HCC): ICD-10-CM

## 2024-09-06 DIAGNOSIS — I36.1 NONRHEUMATIC TRICUSPID VALVE REGURGITATION: ICD-10-CM

## 2024-09-06 DIAGNOSIS — R26.89 POOR BALANCE: ICD-10-CM

## 2024-09-06 DIAGNOSIS — R29.898 WEAKNESS OF BOTH LEGS: ICD-10-CM

## 2024-09-06 PROCEDURE — 99214 OFFICE O/P EST MOD 30 MIN: CPT | Mod: 95 | Performed by: FAMILY MEDICINE

## 2024-09-06 NOTE — PROGRESS NOTES
Virtual Visit: Established Patient   This visit was conducted via Teams using secure and encrypted videoconferencing technology.   The patient was at her private home in the state of [place].  The patient's identity was confirmed and verbal consent was obtained for this virtual visit.     Subjective:     Chief Complaint   Patient presents with    Follow-Up       Char Rockwell is a 89 y.o. female presenting today with her daughters Lorraine and Melissa for a follow up visit. I received two [employer] messages from patient's daughters yesterday: first one was requesting walker and transport chair due to history of two falls this week with leg weakness. I recommended patient be evaluated in ER for acute injuries, and she declined. I then received a second [employer] message yesterday afternoon requesting immediate referral to [facility] hospice without known status change in patient.     I wanted to discuss current situation with patient and her daughters today to ensure that Char, who is AAOx4, is well informed about the choices available to her for ongoing medical care, home care, and general comfort. Patient now has walker with seat to use for mobility and is reporting generalized bilateral leg weakness as day progresses. She has helpers from [employer] coming now daily from 8 am - 3 pm and is sleeping at Melissa's home in her neighborhood (so she is not home alone).     ROS   + generalized leg weakness and increased fall risk    Current Outpatient Medications   Medication Sig Dispense Refill    metoprolol SR (TOPROL XL) 25 MG TABLET SR 24 HR Take 1 Tablet by mouth every day. 90 Tablet 1    furosemide (LASIX) 20 MG Tab Take 1 Tablet by mouth every morning. 90 Tablet 1    potassium chloride ER (KLOR-CON) 10 MEQ tablet Take 1 Tablet by mouth every morning. 90 Tablet 1    atorvastatin (LIPITOR) 20 MG Tab Take 1 Tablet by mouth at bedtime. 90 Tablet 3    lisinopril (PRINIVIL) 10 MG Tab Take 1 Tablet by mouth  every day. 90 Tablet 3    Ascorbic Acid (VITAMIN C ADULT GUMMIES PO) Take  by mouth.      CRANBERRY PO Take 2 Tablets by mouth every day. D mannose/ Cranberry gummies      Cholecalciferol (VITAMIN D3 GUMMIES PO) Take  by mouth.       No current facility-administered medications for this visit.       Patient Active Problem List    Diagnosis Date Noted    Poor balance 09/06/2024    Weakness of both legs 09/06/2024    ACC/AHA stage C systolic heart failure (HCC) 08/30/2024    Peripheral edema 07/15/2024    Physician orders for life-sustaining treatment (POLST) form indicates patient wish for do-not-resuscitate status 07/11/2024    Nonrheumatic tricuspid valve regurgitation 07/10/2024    Nonrheumatic aortic valve stenosis 07/10/2024    Right renal mass 04/14/2023    History of COVID-19 11/14/2022    Mixed dyslipidemia 12/15/2020    Elevated hemoglobin A1c 12/15/2020    History of recurrent UTIs 01/10/2019    Vitamin D deficiency 09/25/2018    History of fall within past 90 days 08/21/2018    Benign essential HTN 07/13/2017    Systolic murmur 06/29/2017    Osteoporosis 08/24/2016        Objective:   There were no vitals taken for this visit.    Physical Exam:  Constitutional: Alert, no distress, well-groomed.  Skin: No rashes in visible areas.  Eye: Round. Conjunctiva clear, lids normal. No icterus.   ENMT: Lips pink without lesions, good dentition, moist mucous membranes. Phonation normal.  Neck: No masses, no thyromegaly. Moves freely without pain.  Respiratory: Unlabored respiratory effort, no cough or audible wheeze  Psych: Alert and oriented x3, normal/baseline affect and mood.     Assessment and Plan:   The following treatment plan was discussed:     1. ACC/AHA stage C systolic heart failure (HCC)/ Nonrheumatic aortic valve stenosis/ Nonrheumatic tricuspid valve regurgitation  New diagnosis for patient since July 2024, and she has seen Dr. Koo for cardiology evaluation on 8/27/24. Patient declines aggressive  intervention at this time but would benefit from home health nursing to monitor overall condition/ ongoing medication compliance.   - Referral to Home Health    2. Peripheral edema  Resolved as long as patient is compliant with daily Lasix 20 mg daily use and low salt diet.   - Referral to Home Health    3. Poor balance  Progressive condition that I feel is multifactorial in origin. Patient would benefit from  PT for balance and strengthening work, and new referral made to [facility] for evaluation and treatment.   - Referral to Home Health    4. Weakness of both legs  Progressive condition that I feel is multifactorial in origin, and Char has declined ER evaluation of acute etiology this week after two falls. I encouraged patient to use her walker for all ambulation, increase hydration and quality food intake throughout the day, and feel she would benefit from  PT for balance and strengthening work. New referral made to [facility] for evaluation and treatment.   - Referral to Home Health     After much discussion today, patient is not interested in transitioning to hospice care at this time. She will continue with [facility] [employer] helper daily and continue to sleep at her daughter's home in her neighborhood nightly. She and her daughters understand that this is a fluid situation, and new care decisions can be made in near future as needed.

## 2024-09-10 ENCOUNTER — APPOINTMENT (OUTPATIENT)
Dept: CARDIOLOGY | Facility: MEDICAL CENTER | Age: 89
End: 2024-09-10
Attending: INTERNAL MEDICINE
Payer: MEDICARE

## 2024-09-11 DIAGNOSIS — B37.0 ORAL THRUSH: ICD-10-CM

## 2024-09-11 RX ORDER — NYSTATIN 100000 [USP'U]/ML
500000 SUSPENSION ORAL 4 TIMES DAILY
Qty: 60 ML | Refills: 1 | Status: SHIPPED | OUTPATIENT
Start: 2024-09-11

## 2024-09-24 ENCOUNTER — NON-PROVIDER VISIT (OUTPATIENT)
Dept: INTERNAL MEDICINE | Facility: IMAGING CENTER | Age: 89
End: 2024-09-24
Payer: MEDICARE

## 2024-09-24 DIAGNOSIS — Z23 NEED FOR VACCINATION: ICD-10-CM

## 2024-09-24 PROCEDURE — 90662 IIV NO PRSV INCREASED AG IM: CPT | Performed by: FAMILY MEDICINE

## 2024-09-24 PROCEDURE — G0008 ADMIN INFLUENZA VIRUS VAC: HCPCS | Performed by: FAMILY MEDICINE

## (undated) DEVICE — BLADE SURGICAL #15 - (50/BX 3BX/CA)

## (undated) DEVICE — ELECTRODE DUAL RETURN W/ CORD - (50/PK)

## (undated) DEVICE — GLOVE BIOGEL PI INDICATOR SZ 8.0 SURGICAL PF LF -(50/BX 4BX/CA)

## (undated) DEVICE — DRAPE C-ARM LARGE 41IN X 74 IN - (10/BX 2BX/CA)

## (undated) DEVICE — PACK MAJOR ORTHO - (2EA/CA)

## (undated) DEVICE — PROTECTOR ULNA NERVE - (36PR/CA)

## (undated) DEVICE — SET EXTENSION WITH 2 PORTS (48EA/CA) ***PART #2C8610 IS A SUBSTITUTE*****

## (undated) DEVICE — SUTURE 0 VICRYL PLUS CT-1 - 8 X 18 INCH (12/BX)

## (undated) DEVICE — DRAPE SURGICAL U 77X120 - (10/CA)

## (undated) DEVICE — LEAD SET 6 DISP. EKG NIHON KOHDEN

## (undated) DEVICE — MASK, LARYNGEAL AIRWAY #4

## (undated) DEVICE — GLOVE BIOGEL SZ 8 SURGICAL PF LTX - (50PR/BX 4BX/CA)

## (undated) DEVICE — SET LEADWIRE 5 LEAD BEDSIDE DISPOSABLE ECG (1SET OF 5/EA)

## (undated) DEVICE — SUCTION INSTRUMENT YANKAUER BULBOUS TIP W/O VENT (50EA/CA)

## (undated) DEVICE — KIT ROOM DECONTAMINATION

## (undated) DEVICE — HEAD HOLDER JUNIOR/ADULT

## (undated) DEVICE — GLOVE BIOGEL SZ 7 SURGICAL PF LTX - (50PR/BX 4BX/CA)

## (undated) DEVICE — SODIUM CHL IRRIGATION 0.9% 1000ML (12EA/CA)

## (undated) DEVICE — ROD GUIDE BALL TIP 3.0MM X 1000MM

## (undated) DEVICE — SUTURE 2-0 VICRYL PLUS CT-1 - 8 X 18 INCH(12/BX)

## (undated) DEVICE — SENSOR SPO2 NEO LNCS ADHESIVE (20/BX) SEE USER NOTES

## (undated) DEVICE — DRESSING TRANSPARENT FILM TEGADERM 4 X 4.75" (50EA/BX)"

## (undated) DEVICE — GLOVE BIOGEL INDICATOR SZ 8.5 SURGICAL PF LTX - (50/BX 4BX/CA)

## (undated) DEVICE — CHLORAPREP 26 ML APPLICATOR - ORANGE TINT(25/CA)

## (undated) DEVICE — DRAPE LARGE 3 QUARTER - (20/CA)

## (undated) DEVICE — TUBING CLEARLINK DUO-VENT - C-FLO (48EA/CA)

## (undated) DEVICE — MASK ANESTHESIA ADULT  - (100/CA)

## (undated) DEVICE — SUTURE GENERAL

## (undated) DEVICE — GOWN WARMING STANDARD FLEX - (30/CA)

## (undated) DEVICE — NEPTUNE 4 PORT MANIFOLD - (20/PK)

## (undated) DEVICE — GLOVE BIOGEL INDICATOR SZ 7.5 SURGICAL PF LTX - (50PR/BX 4BX/CA)

## (undated) DEVICE — GLOVE SZ 7.5 BIOGEL PI MICRO - PF LF (50PR/BX)

## (undated) DEVICE — ELECTRODE 850 FOAM ADHESIVE - HYDROGEL RADIOTRNSPRNT (50/PK)

## (undated) DEVICE — CANISTER SUCTION 3000ML MECHANICAL FILTER AUTO SHUTOFF MEDI-VAC NONSTERILE LF DISP  (40EA/CA)

## (undated) DEVICE — KIT ANESTHESIA W/CIRCUIT & 3/LT BAG W/FILTER (20EA/CA)

## (undated) DEVICE — LACTATED RINGERS INJ 1000 ML - (14EA/CA 60CA/PF)